# Patient Record
Sex: FEMALE | Race: WHITE | NOT HISPANIC OR LATINO | Employment: OTHER | URBAN - METROPOLITAN AREA
[De-identification: names, ages, dates, MRNs, and addresses within clinical notes are randomized per-mention and may not be internally consistent; named-entity substitution may affect disease eponyms.]

---

## 2017-03-27 ENCOUNTER — HOSPITAL ENCOUNTER (OUTPATIENT)
Dept: MAMMOGRAPHY | Facility: CLINIC | Age: 67
Discharge: HOME | End: 2017-03-27
Payer: MEDICARE

## 2017-03-27 DIAGNOSIS — Z12.31 ENCOUNTER FOR SCREENING MAMMOGRAM FOR MALIGNANT NEOPLASM OF BREAST: ICD-10-CM

## 2017-03-27 PROCEDURE — G0202 SCR MAMMO BI INCL CAD: HCPCS

## 2017-06-01 ENCOUNTER — GENERIC CONVERSION - ENCOUNTER (OUTPATIENT)
Dept: OTHER | Facility: OTHER | Age: 67
End: 2017-06-01

## 2017-06-02 LAB
T4 FREE SERPL-MCNC: 1.29 NG/DL (ref 0.82–1.77)
TSH SERPL DL<=0.05 MIU/L-ACNC: 1.14 UIU/ML (ref 0.45–4.5)

## 2017-06-07 ENCOUNTER — ALLSCRIPTS OFFICE VISIT (OUTPATIENT)
Dept: OTHER | Facility: OTHER | Age: 67
End: 2017-06-07

## 2017-12-01 ENCOUNTER — GENERIC CONVERSION - ENCOUNTER (OUTPATIENT)
Dept: OTHER | Facility: OTHER | Age: 67
End: 2017-12-01

## 2017-12-07 ENCOUNTER — GENERIC CONVERSION - ENCOUNTER (OUTPATIENT)
Dept: OTHER | Facility: OTHER | Age: 67
End: 2017-12-07

## 2017-12-07 DIAGNOSIS — D36.9 BENIGN NEOPLASM: ICD-10-CM

## 2017-12-07 DIAGNOSIS — J45.909 UNCOMPLICATED ASTHMA: ICD-10-CM

## 2017-12-07 DIAGNOSIS — E03.9 HYPOTHYROIDISM: ICD-10-CM

## 2017-12-07 DIAGNOSIS — E78.5 HYPERLIPIDEMIA: ICD-10-CM

## 2017-12-07 DIAGNOSIS — I10 ESSENTIAL (PRIMARY) HYPERTENSION: ICD-10-CM

## 2017-12-07 DIAGNOSIS — R73.9 HYPERGLYCEMIA: ICD-10-CM

## 2017-12-07 LAB
BASOPHILS # BLD AUTO: 0 %
BASOPHILS # BLD AUTO: 0 X10E3/UL (ref 0–0.2)
DEPRECATED RDW RBC AUTO: 14.4 % (ref 12.3–15.4)
EOSINOPHIL # BLD AUTO: 0.1 X10E3/UL (ref 0–0.4)
EOSINOPHIL # BLD AUTO: 1 %
HCT VFR BLD AUTO: 36.7 % (ref 34–46.6)
HGB BLD-MCNC: 12.3 G/DL (ref 11.1–15.9)
IMM.GRANULOCYTES (CD4/8) (HISTORICAL): 0 %
IMM.GRANULOCYTES (CD4/8) (HISTORICAL): 0 X10E3/UL (ref 0–0.1)
LYMPHOCYTES # BLD AUTO: 1.8 X10E3/UL (ref 0.7–3.1)
LYMPHOCYTES # BLD AUTO: 41 %
MCH RBC QN AUTO: 30.3 PG (ref 26.6–33)
MCHC RBC AUTO-ENTMCNC: 33.5 G/DL (ref 31.5–35.7)
MCV RBC AUTO: 90 FL (ref 79–97)
MONOCYTES # BLD AUTO: 0.4 X10E3/UL (ref 0.1–0.9)
MONOCYTES (HISTORICAL): 8 %
NEUTROPHILS # BLD AUTO: 2.1 X10E3/UL (ref 1.4–7)
NEUTROPHILS # BLD AUTO: 50 %
PLATELET # BLD AUTO: 226 X10E3/UL (ref 150–379)
RBC (HISTORICAL): 4.06 X10E6/UL (ref 3.77–5.28)
WBC # BLD AUTO: 4.4 X10E3/UL (ref 3.4–10.8)

## 2017-12-08 LAB
A/G RATIO (HISTORICAL): 2.1 (ref 1.2–2.2)
ALBUMIN SERPL BCP-MCNC: 4.4 G/DL (ref 3.6–4.8)
ALP SERPL-CCNC: 46 IU/L (ref 39–117)
ALT SERPL W P-5'-P-CCNC: 12 IU/L (ref 0–32)
AST SERPL W P-5'-P-CCNC: 18 IU/L (ref 0–40)
BILIRUB SERPL-MCNC: 0.3 MG/DL (ref 0–1.2)
BUN SERPL-MCNC: 16 MG/DL (ref 8–27)
BUN/CREA RATIO (HISTORICAL): 25 (ref 12–28)
CALCIUM SERPL-MCNC: 8.9 MG/DL (ref 8.7–10.3)
CHLORIDE SERPL-SCNC: 102 MMOL/L (ref 96–106)
CO2 SERPL-SCNC: 25 MMOL/L (ref 18–29)
CREAT SERPL-MCNC: 0.63 MG/DL (ref 0.57–1)
EGFR AFRICAN AMERICAN (HISTORICAL): 107 ML/MIN/1.73
EGFR-AMERICAN CALC (HISTORICAL): 93 ML/MIN/1.73
GLUCOSE SERPL-MCNC: 90 MG/DL (ref 65–99)
LDL CHOLESTEROL DIRECT (HISTORICAL): 112 MG/DL (ref 0–99)
POTASSIUM SERPL-SCNC: 4.9 MMOL/L (ref 3.5–5.2)
SODIUM SERPL-SCNC: 141 MMOL/L (ref 134–144)
T4 FREE SERPL-MCNC: 1.34 NG/DL (ref 0.82–1.77)
TOT. GLOBULIN, SERUM (HISTORICAL): 2.1 G/DL (ref 1.5–4.5)
TOTAL PROTEIN (HISTORICAL): 6.5 G/DL (ref 6–8.5)
TRIGL SERPL-MCNC: 58 MG/DL (ref 0–149)
TSH SERPL DL<=0.05 MIU/L-ACNC: 1.15 UIU/ML (ref 0.45–4.5)

## 2017-12-19 ENCOUNTER — ALLSCRIPTS OFFICE VISIT (OUTPATIENT)
Dept: OTHER | Facility: OTHER | Age: 67
End: 2017-12-19

## 2018-01-13 VITALS
TEMPERATURE: 96.8 F | BODY MASS INDEX: 22.96 KG/M2 | DIASTOLIC BLOOD PRESSURE: 78 MMHG | SYSTOLIC BLOOD PRESSURE: 140 MMHG | HEART RATE: 75 BPM | OXYGEN SATURATION: 99 % | WEIGHT: 121.6 LBS | HEIGHT: 61 IN

## 2018-01-13 NOTE — RESULT NOTES
Verified Results  * DXA BONE DENSITY SPINE HIP AND PELVIS [id number] 10:10AM Lou Toure Order Number: [id number]     Test Name Result Flag Reference   DXA BONE DENSITY SPINE HIP AND PELVIS (Report)     CENTRAL DXA SCAN     CLINICAL HISTORY:  77year old post-menopausal  female risk factors include history glucocorticoid use  Previously treated with alendronate  TECHNIQUE: Bone densitometry was performed using a Horizon C bone densitometer  Regions of interest appear properly placed  There are no obvious fractures or other confounding variables which could limit the study  COMPARISON: 7/15/2014  RESULTS:    LUMBAR SPINE: L1-L4:   BMD 0 723 gm/cm2   T-score -2 9   Z-score -1 1     LEFT TOTAL HIP:   BMD 0 711 gm/cm2   T-score -1 9   Z-score -0 6     LEFT FEMORAL NECK:   BMD 0 606 gm/cm2   T-score -2 2   Z-score -0 6             IMPRESSION:   1  Based on the [facility] classification, the T-score of -2 9 in the lumbar spine is consistent with osteoporosis  2  Since the prior study, there has been a decrease of the BMD in the lumbar spine of 0 017 gm/cm2 or 2 5%  In the left hip, there has been a increase in BMD of 0 003 gm/cm2 or 0 4%  The lumbar spine decrease is borderline significant (formal    precision assessment is not available at this time for this scan site)  Clinical evaluation is recommended for treatment of osteoporosis  3  According to the [address] [place] [address], prescription therapy is recommended with a T-score of -2 5 or less in the spine or hip after appropriate evaluation to exclude secondary causes  4  A daily intake of at least 1200 mg Calcium and 800 to 1000 IU of Vitamin D, as well as weight bearing and muscle strengthening exercise, fall prevention and avoidance of tobacco and excessive alcohol intake as basic preventive measures are    suggested  5  Repeat DXA in 18 - 24 months, on the same machine, as clinically indicated           WHO CLASSIFICATION: Normal (a T-score of -1 0 or higher)   Low bone mineral density (a T-score of less than -1 0 but higher than -2 5)   Osteoporosis (a T-score of -2 5 or less)   Severe osteoporosis (a T-score of -2 5 or less with a fragility fracture)             Workstation performed: [id number]     Signed by:   Roxana Tang MD   9/28/16

## 2018-01-17 NOTE — RESULT NOTES
Verified Results  (1) HEMOGLOBIN A1C [id number] 07:18AM Zachary Marshall     Test Name Result Flag Reference   Hemoglobin A1c 5 5 %  4 8-5 6   Pre-diabetes: 5 7 - 6 4           Diabetes: >6 4           Glycemic control for adults with diabetes: <7 0

## 2018-01-18 NOTE — RESULT NOTES
Verified Results  (1) COMPREHENSIVE METABOLIC PANEL [id number] 28:18AY [facility]     Test Name Result Flag Reference   Glucose, Serum 92 mg/dL  65-99   BUN 11 mg/dL  8-27   Creatinine, Serum 0 56 mg/dL L 0 57-1 00   eGFR If NonAfricn Am 97 mL/min/1 73  >59   eGFR If Africn Am 112 mL/min/1 73  >59   BUN/Creatinine Ratio 20  11-26   Sodium, Serum 141 mmol/L  136-144   Potassium, Serum 4 6 mmol/L  3 5-5 2   Chloride, Serum 102 mmol/L     Carbon Dioxide, Total 25 mmol/L  18-29   Calcium, Serum 9 2 mg/dL  8 7-10 3   Protein, Total, Serum 6 2 g/dL  6 0-8 5   Albumin, Serum 4 2 g/dL  3 6-4 8   Globulin, Total 2 0 g/dL  1 5-4 5   A/G Ratio 2 1  1 1-2 5   Bilirubin, Total 0 3 mg/dL  0 0-1 2   Alkaline Phosphatase, S 47 IU/L     AST (SGOT) 14 IU/L  0-40   ALT (SGPT) 11 IU/L  0-32     (LC) Lipid Panel [id number] 07:18AM [facility]     Test Name Result Flag Reference   Cholesterol, Total 205 mg/dL H 100-199   Triglycerides 61 mg/dL  0-149   HDL Cholesterol 91 mg/dL  >39   VLDL Cholesterol Timbo 12 mg/dL  5-40   LDL Cholesterol Calc 102 mg/dL H 0-99     (1) LDL,DIRECT [id number] 07:18AM [facility]     Test Name Result Flag Reference   LDL Chol  (Direct) 112 mg/dL H 0-99     (LC) Thyroxine (T4) Free, Direct, S [id number] 07:18AM [facility]     Test Name Result Flag Reference   T4,Free(Direct) 1 39 ng/dL  0 82-1 77     (1) TSH [id number] 07:18AM [facility]     Test Name Result Flag Reference   TSH 0 899 uIU/mL  0 450-4 500     (1) VITAMIN D 25-HYDROXY [id number] 07:18AM [facility]     Test Name Result Flag Reference   Vitamin D, 25-Hydroxy 36 2 ng/mL  30 0-100 0   Vitamin D deficiency has been defined by the [place] practice guideline as a  level of serum 25-OH vitamin D less than 20 ng/mL (1,2)  The Endocrine Society went on to further define vitamin D  insufficiency as a level between 21 and 29 ng/mL (2)  1  IOM ([employer] [employer])  2010   Dietary reference intakes for calcium and D  [address] [place] [address]: The     [employer]  2  Cherelle MF, Javy NC, Chichi TENORIO, et al      Evaluation, treatment, and prevention of vitamin D     deficiency: an Endocrine Society clinical practice     guideline  JCEM  2011 Jul; 96(7):1911-30  [facility] RY82 Default [id number] 07:18AM Iman Shah Zachary     Test Name Result Flag Reference   Rula Oliverkiley CMP14 Default Comment     A hand-written panel/profile was received from your office  In  accordance with the [facility] Ambiguous Test Code Policy dated July 5756, we have completed your order by using the closest currently  or formerly recognized AMA panel  We have assigned Comprehensive  Metabolic Panel (14), Test Code #820236 to this request   If this  is not the testing you wished to receive on this specimen, please  contact the [address] [place] [address] [employer] Inquiry/Technical Services Department  to clarify the test order  We appreciate your business  [facility]) Rula Díaz LP Default [id number] 07:18AM Iman Shah Zachary     Test Name Result Flag Reference   Nina Edwards LP Default Comment     A hand-written panel/profile was received from your office  In  accordance with the [facility] Ambiguous Test Code Policy dated July 5280, we have completed your order by using the closest currently  or formerly recognized AMA panel  We have assigned Lipid Panel,  Test Code #668352 to this request  If this is not the testing you  wished to receive on this specimen, please contact the [address] [place] [address]  Client Inquiry/Technical Services Department to clarify the test  order  We appreciate your business

## 2018-01-22 VITALS
DIASTOLIC BLOOD PRESSURE: 82 MMHG | SYSTOLIC BLOOD PRESSURE: 140 MMHG | WEIGHT: 125 LBS | HEART RATE: 76 BPM | HEIGHT: 61 IN | BODY MASS INDEX: 23.6 KG/M2 | TEMPERATURE: 97 F | OXYGEN SATURATION: 98 %

## 2018-02-12 DIAGNOSIS — M81.0 AGE RELATED OSTEOPOROSIS, UNSPECIFIED PATHOLOGICAL FRACTURE PRESENCE: Primary | ICD-10-CM

## 2018-02-12 RX ORDER — ALENDRONATE SODIUM 70 MG/1
70 TABLET ORAL
Qty: 12 TABLET | Refills: 3 | Status: SHIPPED | OUTPATIENT
Start: 2018-02-12 | End: 2018-06-04 | Stop reason: SDUPTHER

## 2018-02-12 RX ORDER — ALENDRONATE SODIUM 70 MG/1
TABLET ORAL
COMMUNITY
Start: 2013-08-14 | End: 2018-02-12 | Stop reason: SDUPTHER

## 2018-04-11 ENCOUNTER — HOSPITAL ENCOUNTER (OUTPATIENT)
Dept: MAMMOGRAPHY | Facility: CLINIC | Age: 68
Discharge: HOME | End: 2018-04-11
Payer: MEDICARE

## 2018-04-11 DIAGNOSIS — N60.19 DIFFUSE CYSTIC MASTOPATHY OF BREAST: ICD-10-CM

## 2018-04-11 DIAGNOSIS — Z12.31 ENCOUNTER FOR SCREENING MAMMOGRAM FOR MALIGNANT NEOPLASM OF BREAST: ICD-10-CM

## 2018-04-11 PROCEDURE — 77067 SCR MAMMO BI INCL CAD: CPT

## 2018-04-11 PROCEDURE — 77063 BREAST TOMOSYNTHESIS BI: CPT

## 2018-04-19 DIAGNOSIS — Z12.31 ENCOUNTER FOR SCREENING MAMMOGRAM FOR MALIGNANT NEOPLASM OF BREAST: ICD-10-CM

## 2018-04-19 DIAGNOSIS — N60.19 DIFFUSE CYSTIC MASTOPATHY OF BREAST: ICD-10-CM

## 2018-05-04 ENCOUNTER — TELEPHONE (OUTPATIENT)
Dept: INTERNAL MEDICINE CLINIC | Facility: CLINIC | Age: 68
End: 2018-05-04

## 2018-05-04 DIAGNOSIS — E03.9 HYPOTHYROIDISM, UNSPECIFIED TYPE: Primary | ICD-10-CM

## 2018-05-04 RX ORDER — LEVOTHYROXINE SODIUM 88 UG/1
88 TABLET ORAL DAILY
Qty: 50 TABLET | Refills: 5 | Status: CANCELLED | OUTPATIENT
Start: 2018-05-04

## 2018-05-04 RX ORDER — LEVOTHYROXINE SODIUM 88 UG/1
1 TABLET ORAL DAILY
COMMUNITY
Start: 2014-02-17 | End: 2018-05-04 | Stop reason: SDUPTHER

## 2018-05-04 RX ORDER — LEVOTHYROXINE SODIUM 88 UG/1
88 TABLET ORAL DAILY
Qty: 90 TABLET | Refills: 3 | Status: SHIPPED | OUTPATIENT
Start: 2018-05-04 | End: 2019-01-09 | Stop reason: SDUPTHER

## 2018-05-04 NOTE — TELEPHONE ENCOUNTER
Pharmacy called [facility] to refill a levothyroxine 88 mcg for armen crawfordnatan  [birth year]     Can we please refill this

## 2018-05-19 DIAGNOSIS — M81.0 AGE-RELATED OSTEOPOROSIS WITHOUT CURRENT PATHOLOGICAL FRACTURE: ICD-10-CM

## 2018-05-19 DIAGNOSIS — I10 ESSENTIAL (PRIMARY) HYPERTENSION: ICD-10-CM

## 2018-05-19 DIAGNOSIS — E03.9 HYPOTHYROIDISM: ICD-10-CM

## 2018-05-19 DIAGNOSIS — E78.5 HYPERLIPIDEMIA: ICD-10-CM

## 2018-05-19 DIAGNOSIS — D36.9 BENIGN NEOPLASM: ICD-10-CM

## 2018-05-19 DIAGNOSIS — N60.19 DIFFUSE CYSTIC MASTOPATHY OF BREAST: ICD-10-CM

## 2018-05-19 DIAGNOSIS — J45.909 UNCOMPLICATED ASTHMA: ICD-10-CM

## 2018-05-19 DIAGNOSIS — R73.9 HYPERGLYCEMIA: ICD-10-CM

## 2018-06-04 ENCOUNTER — OFFICE VISIT (OUTPATIENT)
Dept: INTERNAL MEDICINE CLINIC | Facility: CLINIC | Age: 68
End: 2018-06-04
Payer: MEDICARE

## 2018-06-04 VITALS
OXYGEN SATURATION: 98 % | SYSTOLIC BLOOD PRESSURE: 144 MMHG | HEIGHT: 60 IN | TEMPERATURE: 98 F | DIASTOLIC BLOOD PRESSURE: 82 MMHG | BODY MASS INDEX: 25.29 KG/M2 | WEIGHT: 128.8 LBS | RESPIRATION RATE: 18 BRPM | HEART RATE: 72 BPM
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
## 2018-06-04 DIAGNOSIS — M81.0 AGE RELATED OSTEOPOROSIS, UNSPECIFIED PATHOLOGICAL FRACTURE PRESENCE: ICD-10-CM

## 2018-06-04 PROBLEM — D36.9 TUBULAR ADENOMA: Status: ACTIVE | Noted: 2017-06-07

## 2018-06-04 PROCEDURE — 99214 OFFICE O/P EST MOD 30 MIN: CPT | Performed by: NURSE PRACTITIONER

## 2018-06-04 RX ORDER — ACETAMINOPHEN 160 MG
1 TABLET,DISINTEGRATING ORAL DAILY
COMMUNITY
End: 2019-01-09 | Stop reason: SDUPTHER

## 2018-06-04 RX ORDER — LISINOPRIL 20 MG/1
1 TABLET ORAL DAILY
COMMUNITY
Start: 2013-08-01 | End: 2019-01-09 | Stop reason: SDUPTHER

## 2018-06-04 RX ORDER — VITAMIN E 268 MG
1 CAPSULE ORAL 3 TIMES DAILY
COMMUNITY
End: 2020-03-03 | Stop reason: SDUPTHER

## 2018-06-04 RX ORDER — ECHINACEA 400 MG
2 CAPSULE ORAL DAILY
COMMUNITY

## 2018-06-04 RX ORDER — DOXYCYCLINE HYCLATE 100 MG/1
CAPSULE ORAL
Qty: 2 CAPSULE | Refills: 0 | Status: SHIPPED | OUTPATIENT
Start: 2018-06-04 | End: 2018-06-04

## 2018-06-04 RX ORDER — PRAVASTATIN SODIUM 80 MG/1
1 TABLET ORAL DAILY
COMMUNITY
Start: 2013-08-01 | End: 2018-07-15 | Stop reason: SDUPTHER

## 2018-06-04 RX ORDER — ALBUTEROL SULFATE 90 UG/1
2 AEROSOL, METERED RESPIRATORY (INHALATION) EVERY 4 HOURS PRN
COMMUNITY
Start: 2016-02-11 | End: 2019-08-21 | Stop reason: SDUPTHER

## 2018-06-04 RX ORDER — ALENDRONATE SODIUM 70 MG/1
70 TABLET ORAL
Qty: 12 TABLET | Refills: 0 | Status: SHIPPED | OUTPATIENT
Start: 2018-06-04 | End: 2019-01-09 | Stop reason: SDUPTHER

## 2018-06-04 NOTE — PATIENT INSTRUCTIONS
1  Tick bite-will take 2 capsules of Doxycycline today, signs and symptoms of Lyme reviewed  2  Osteoporosis-has been off Fosamax since February due to pharmacy issue, will restart now  Due for next Dexa in November  Follow up with me as needed and with Dr Donato Gross as scheduled 
01-Jun-2018

## 2018-06-04 NOTE — PROGRESS NOTES
Assessment/Plan:    1  Tick bite-will take 2 capsules of Doxycycline today, signs and symptoms of Lyme reviewed  2  Osteoporosis-has been off Fosamax since February due to pharmacy issue, will restart now  Due for next Dexa in November  Follow up with me as needed and with Dr Andi Milner as scheduled  Diagnoses and all orders for this visit:    Tick bite, initial encounter  -     doxycycline hyclate (VIBRAMYCIN) 100 mg capsule; Tke 2 tabs po now with food  Age related osteoporosis, unspecified pathological fracture presence  -     alendronate (FOSAMAX) 70 mg tablet; Take 1 tablet (70 mg total) by mouth every 7 days    Other orders  -     albuterol (PROAIR HFA) 90 mcg/act inhaler; Inhale 2 puffs every 4 (four) hours as needed  -     aspirin 81 MG tablet; Take 1 tablet by mouth daily  -     Flaxseed, Linseed, (FLAXSEED OIL) 1000 MG CAPS; Take 2 capsules by mouth daily  -     lisinopril (ZESTRIL) 20 mg tablet; Take 1 tablet by mouth daily  -     Peak Flow Meter CORNELIA; by Does not apply route  -     pravastatin (PRAVACHOL) 80 mg tablet; Take 1 tablet by mouth daily  -     Cholecalciferol (VITAMIN D3) 2000 units capsule; Take 1 tablet by mouth daily  -     vitamin E, tocopherol, 400 units capsule; Take 1 capsule by mouth 3 (three) times a day        The patient was counseled regarding instructions for management, risk factor reductions, patient and family education,impressions, risks and benefits of treatment options, side effects of medications, importance of compliance with treatment  The treatment plan was reviewed with the patient/guardian and patient/guardian understands and agrees with the treatment plan              Current Outpatient Prescriptions:     albuterol (PROAIR HFA) 90 mcg/act inhaler, Inhale 2 puffs every 4 (four) hours as needed, Disp: , Rfl:     alendronate (FOSAMAX) 70 mg tablet, Take 1 tablet (70 mg total) by mouth every 7 days, Disp: 12 tablet, Rfl: 0    aspirin 81 MG tablet, Take 1 tablet by mouth daily, Disp: , Rfl:     Cholecalciferol (VITAMIN D3) 2000 units capsule, Take 1 tablet by mouth daily, Disp: , Rfl:     Flaxseed, Linseed, (FLAXSEED OIL) 1000 MG CAPS, Take 2 capsules by mouth daily, Disp: , Rfl:     levothyroxine 88 mcg tablet, Take 1 tablet (88 mcg total) by mouth daily, Disp: 90 tablet, Rfl: 3    lisinopril (ZESTRIL) 20 mg tablet, Take 1 tablet by mouth daily, Disp: , Rfl:     Peak Flow Meter CORNELIA, by Does not apply route, Disp: , Rfl:     pravastatin (PRAVACHOL) 80 mg tablet, Take 1 tablet by mouth daily, Disp: , Rfl:     vitamin E, tocopherol, 400 units capsule, Take 1 capsule by mouth 3 (three) times a day, Disp: , Rfl:     doxycycline hyclate (VIBRAMYCIN) 100 mg capsule, Tke 2 tabs po now with food  , Disp: 2 capsule, Rfl: 0    Subjective:      Patient ID: Shanon Jovel is a 79 y o  female  Sloane Hayes found a tick on her left ankle yesterday  NO rash, no bullseye        The following portions of the patient's history were reviewed and updated as appropriate:   She has a past medical history of Pneumonia and Reactive airway disease  ,   does not have any pertinent problems on file  ,   has a past surgical history that includes Neck surgery (10/2009); Neuroplasty / transposition median nerve at carpal tunnel; and Total thyroidectomy  ,  family history includes Diabetes in her mother; Heart disease in her father; Hypertension in her mother  ,   reports that she has quit smoking  She has never used smokeless tobacco  She reports that she does not drink alcohol or use drugs  ,  has No Known Allergies       Review of Systems   Constitutional: Negative  Respiratory: Negative  Cardiovascular: Negative  Musculoskeletal: Negative  Psychiatric/Behavioral: Negative            Objective:  /82 (BP Location: Left arm, Patient Position: Sitting, Cuff Size: Standard)   Pulse 72   Temp 98 °F (36 7 °C) (Tympanic)   Resp 18   Ht 5' (1 524 m)   Wt 58 4 kg (128 lb 12 8 oz)   SpO2 98% BMI 25 15 kg/m²     Lab Review  not applicable     Imaging: No results found  Physical Exam   Constitutional: She is oriented to person, place, and time  She appears well-developed and well-nourished  Cardiovascular: Normal rate, regular rhythm, normal heart sounds and intact distal pulses  Pulmonary/Chest: Effort normal and breath sounds normal    Musculoskeletal: Normal range of motion  Neurological: She is alert and oriented to person, place, and time  She has normal reflexes  Psychiatric: She has a normal mood and affect   Her behavior is normal  Judgment and thought content normal

## 2018-06-29 LAB
25(OH)D3+25(OH)D2 SERPL-MCNC: 32.8 NG/ML (ref 30–100)
CHOLEST SERPL-MCNC: 212 MG/DL (ref 100–199)
HBA1C MFR BLD: 5.5 % (ref 4.8–5.6)
HCV AB S/CO SERPL IA: <0.1 S/CO RATIO (ref 0–0.9)
HDLC SERPL-MCNC: 96 MG/DL
LDLC SERPL CALC-MCNC: 105 MG/DL (ref 0–99)
SL AMB INTERPRETATION: NORMAL
SL AMB VLDL CHOLESTEROL CALC: 11 MG/DL (ref 5–40)
T4 FREE SERPL-MCNC: 1.56 NG/DL (ref 0.82–1.77)
TRIGL SERPL-MCNC: 57 MG/DL (ref 0–149)
TSH SERPL DL<=0.005 MIU/L-ACNC: 1.11 UIU/ML (ref 0.45–4.5)

## 2018-07-03 ENCOUNTER — OFFICE VISIT (OUTPATIENT)
Dept: INTERNAL MEDICINE CLINIC | Facility: CLINIC | Age: 68
End: 2018-07-03
Payer: MEDICARE

## 2018-07-03 VITALS
DIASTOLIC BLOOD PRESSURE: 80 MMHG | WEIGHT: 128 LBS | HEART RATE: 70 BPM | SYSTOLIC BLOOD PRESSURE: 130 MMHG | HEIGHT: 60 IN | TEMPERATURE: 97.8 F | BODY MASS INDEX: 25.13 KG/M2 | RESPIRATION RATE: 18 BRPM | OXYGEN SATURATION: 98 %

## 2018-07-03 DIAGNOSIS — E78.5 HYPERLIPIDEMIA, UNSPECIFIED HYPERLIPIDEMIA TYPE: ICD-10-CM

## 2018-07-03 DIAGNOSIS — M81.0 AGE RELATED OSTEOPOROSIS, UNSPECIFIED PATHOLOGICAL FRACTURE PRESENCE: ICD-10-CM

## 2018-07-03 DIAGNOSIS — E03.9 HYPOTHYROIDISM, UNSPECIFIED TYPE: ICD-10-CM

## 2018-07-03 DIAGNOSIS — I10 HYPERTENSION, UNSPECIFIED TYPE: Primary | ICD-10-CM

## 2018-07-03 DIAGNOSIS — Z23 NEED FOR SHINGLES VACCINE: ICD-10-CM

## 2018-07-03 DIAGNOSIS — D36.9 TUBULAR ADENOMA: ICD-10-CM

## 2018-07-03 DIAGNOSIS — R73.9 HYPERGLYCEMIA: ICD-10-CM
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
## 2018-07-03 PROCEDURE — 99214 OFFICE O/P EST MOD 30 MIN: CPT | Performed by: INTERNAL MEDICINE

## 2018-07-03 NOTE — PATIENT INSTRUCTIONS
Osteoporosis   WHAT YOU NEED TO KNOW:   What is osteoporosis? Osteoporosis is a long-term medical condition that causes your bones to become weak, brittle, and more likely to fracture  Osteoporosis occurs when your body absorbs more bone than it makes  It is also caused by a lack of calcium and estrogen (female hormone)  What increases my risk for osteoporosis? · Age older than 28    · Low estrogen levels    · Female gender    · Alcohol, tobacco, or caffeine    · Lack of calcium and vitamin D in your foods    · Lack of exercise    · Some illnesses, such as thyroid diseases, bone cancer, and long-term lung diseases    · Certain medicines, such as steroids, anticonvulsants, and blood-thinners  What are the signs and symptoms of osteoporosis? You may not have any signs or symptoms  You may break a bone after a muscle strain, bump, or fall  A break usually occurs in the hip, spine, or wrist  A collapsed vertebra (bone in your spine) may cause severe back pain or loss of height from bent posture  How is osteoporosis diagnosed? · Blood and urine tests  measure your calcium, vitamin D, and estrogen levels  · An x-ray or CT  may show thinned bones or a fracture  You may be given contrast liquid to help the bones show up better in the pictures  Tell the healthcare provider if you have ever had an allergic reaction to contrast liquid  Do not enter the MRI room with anything metal  Metal can cause serious injury  Tell the healthcare provider if you have any metal in or on your body  · A bone density test  compares your bone thickness with what is expected for someone of your age, gender, and ethnicity  How is osteoporosis treated? Medicines may be given to prevent bone loss, build new bone, and increase estrogen  These medicines help prevent fractures and may be given as a pill or injection  Ask your healthcare provider for more information on these medicines  How can I help prevent bone loss?    · Eat healthy foods that are high in calcium  This helps keep your bones strong  Good sources of calcium are milk, cheese, broccoli, tofu, almonds, and canned salmon and sardines  · Increase your vitamin D intake  Vitamin D is in fish oils, some vegetables, and fortified milk, cereal, and bread  Vitamin D is also formed in the skin when it is exposed to the sun  Ask your healthcare provider how much sunlight is safe for you  · Drink liquids as directed  Ask your healthcare provider how much liquid to drink each day and which liquids are best for you  Do not have alcohol or caffeine  They decrease bone mineral density, which can weaken your bones  · Exercise regularly  Ask your healthcare provider about the best exercise plan for you  Weight bearing exercise for 30 minutes, 3 times a week can help build and strengthen bone  · Do not smoke  Nicotine and other chemicals in cigarettes and cigars can cause lung damage  Ask your healthcare provider for information if you currently smoke and need help to quit  E-cigarettes or smokeless tobacco still contain nicotine  Talk to your healthcare provider before you use these products  · Go to physical therapy as directed  A physical therapist teaches you exercises to help improve movement and muscle strength  When should I seek immediate care? · You have severe pain  When should I contact my healthcare provider? · You have increasing pain after a fall  · You have pain when you do your daily activities  · You have questions or concerns about your condition or care  CARE AGREEMENT:   You have the right to help plan your care  Learn about your health condition and how it may be treated  Discuss treatment options with your caregivers to decide what care you want to receive  You always have the right to refuse treatment  The above information is an  only  It is not intended as medical advice for individual conditions or treatments  Talk to your doctor, nurse or pharmacist before following any medical regimen to see if it is safe and effective for you  © 2017 [employer] Information is for End User's use only and may not be sold, redistributed or otherwise used for commercial purposes  All illustrations and images included in CareNotes® are the copyrighted property of A D A M , Inc  or Roger Motley

## 2018-07-03 NOTE — PROGRESS NOTES
Assessment/Plan: refuse colonoscopy for adenoma colon agrees cologuard  1  Osteoporosis will start a weight lifting program in addition to her exercise bike she has been walking regularly, will check DEXA scan  2  Hypertension is at goal blood pressure readings at home have been excellent  3  Hyperlipidemia at goal recheck after next visit  4  Hyperglycemia is at goal A1c is normal recheck in 6 months  5  Hypothyroidism is at goal recheck in 6 months  6  Vitamin-D deficiency would like her vitamin-D to be at 40 will recheck in 6 months has been taking 2000 units of vitamin D3 will increase by 3 pills a week         Diagnoses and all orders for this visit:    Hypertension, unspecified type  -     CBC and differential; Future  -     Comprehensive metabolic panel; Future  -     TSH, 3rd generation with Free T4 reflex; Future  -     Vitamin D 25 hydroxy; Future    Hyperlipidemia, unspecified hyperlipidemia type  -     CBC and differential; Future  -     Comprehensive metabolic panel; Future  -     TSH, 3rd generation with Free T4 reflex; Future  -     Vitamin D 25 hydroxy; Future    Hypothyroidism, unspecified type  -     CBC and differential; Future  -     Comprehensive metabolic panel; Future  -     TSH, 3rd generation with Free T4 reflex; Future  -     Vitamin D 25 hydroxy; Future    Tubular adenoma  -     CBC and differential; Future  -     Comprehensive metabolic panel; Future  -     TSH, 3rd generation with Free T4 reflex; Future  -     Vitamin D 25 hydroxy; Future  -     Cologuard; Future    Need for shingles vaccine  -     Zoster Vac Recomb Adjuvanted ([address] [place] [address] [address]) 50 MCG SUSR; Inject 50 mcg into a muscle once for 1 dose  -     CBC and differential; Future  -     Comprehensive metabolic panel; Future  -     TSH, 3rd generation with Free T4 reflex; Future  -     Vitamin D 25 hydroxy;  Future    Age related osteoporosis, unspecified pathological fracture presence  -     CBC and differential; Future  - Comprehensive metabolic panel; Future  -     TSH, 3rd generation with Free T4 reflex; Future  -     Vitamin D 25 hydroxy; Future  -     DXA bone density spine hip and pelvis; Future    Hyperglycemia         Scheduled Meds:  Continuous Infusions:  No current facility-administered medications for this visit  PRN Meds:   Scheduled Meds:  Continuous Infusions:  No current facility-administered medications for this visit  Scheduled Meds:    Current Outpatient Prescriptions:     albuterol (PROAIR HFA) 90 mcg/act inhaler, Inhale 2 puffs every 4 (four) hours as needed, Disp: , Rfl:     alendronate (FOSAMAX) 70 mg tablet, Take 1 tablet (70 mg total) by mouth every 7 days, Disp: 12 tablet, Rfl: 0    aspirin 81 MG tablet, Take 1 tablet by mouth daily, Disp: , Rfl:     Cholecalciferol (VITAMIN D3) 2000 units capsule, Take 1 tablet by mouth daily, Disp: , Rfl:     Flaxseed, Linseed, (FLAXSEED OIL) 1000 MG CAPS, Take 2 capsules by mouth daily, Disp: , Rfl:     levothyroxine 88 mcg tablet, Take 1 tablet (88 mcg total) by mouth daily, Disp: 90 tablet, Rfl: 3    lisinopril (ZESTRIL) 20 mg tablet, Take 1 tablet by mouth daily, Disp: , Rfl:     Peak Flow Meter CORNELIA, by Does not apply route, Disp: , Rfl:     pravastatin (PRAVACHOL) 80 mg tablet, Take 1 tablet by mouth daily, Disp: , Rfl:     vitamin E, tocopherol, 400 units capsule, Take 1 capsule by mouth 3 (three) times a day, Disp: , Rfl:     Zoster Vac Recomb Adjuvanted (SHINGRIX) 50 MCG SUSR, Inject 50 mcg into a muscle once for 1 dose, Disp: 1 each, Rfl: 1      The patient was counseled regarding instructions for management, risk factor reductions, patient and family education,impressions, risks and benefits of treatment options, side effects of medications, importance of compliance with treatment  The treatment plan was reviewed with the patient/guardian and patient/guardian understands and agrees with the treatment plan           Subjective:      Patient ID: Vanna Douglas Xenia is a 79 y o  female  She had a very difficult time with a prep for colonoscopy        The following portions of the patient's history were reviewed and updated as appropriate:   She has a past medical history of Pneumonia and Reactive airway disease  ,   does not have any pertinent problems on file  ,   has a past surgical history that includes Neck surgery (10/2009); Neuroplasty / transposition median nerve at carpal tunnel; and Total thyroidectomy  ,  family history includes Diabetes in her mother; Heart disease in her father; Hypertension in her mother  ,   reports that she quit smoking about 8 years ago  She has never used smokeless tobacco  She reports that she does not drink alcohol or use drugs  ,  has No Known Allergies       Review of Systems   Constitutional: Negative for appetite change, chills, fatigue, fever and unexpected weight change  HENT: Negative for congestion, ear pain, facial swelling, hearing loss, mouth sores, nosebleeds, postnasal drip, rhinorrhea, sinus pain, sore throat, trouble swallowing and voice change  Eyes: Negative for pain, discharge, redness and visual disturbance  Respiratory: Negative for apnea, chest tightness, shortness of breath, wheezing and stridor  Cardiovascular: Negative for chest pain, palpitations and leg swelling  Gastrointestinal: Negative for abdominal distention, abdominal pain, blood in stool, constipation, diarrhea and vomiting  Endocrine: Negative for cold intolerance, heat intolerance, polydipsia, polyphagia and polyuria  Genitourinary: Negative for difficulty urinating, dysuria, flank pain, frequency, genital sores, hematuria and urgency  Musculoskeletal: Negative for arthralgias and back pain  Skin: Negative for rash and wound  Allergic/Immunologic: Negative for environmental allergies, food allergies and immunocompromised state     Neurological: Negative for dizziness, tremors, seizures, syncope, facial asymmetry, speech difficulty, weakness, light-headedness, numbness and headaches  Hematological: Negative for adenopathy  Does not bruise/bleed easily  Psychiatric/Behavioral: Negative for agitation, behavioral problems, dysphoric mood, hallucinations, self-injury, sleep disturbance and suicidal ideas  The patient is not hyperactive  Objective:     Physical Exam   Constitutional: She is oriented to person, place, and time  She appears well-developed  HENT:   Right Ear: External ear normal    Left Ear: External ear normal    Eyes: Right eye exhibits no discharge  Left eye exhibits no discharge  No scleral icterus  Neck: Carotid bruit is not present  No tracheal deviation present  No thyroid mass and no thyromegaly present  Cardiovascular: Normal rate, regular rhythm, normal heart sounds and intact distal pulses  Exam reveals no gallop and no friction rub  No murmur heard  Pulmonary/Chest: No respiratory distress  She has no wheezes  She has no rales  Musculoskeletal: She exhibits no edema  Lymphadenopathy:     She has no cervical adenopathy  Neurological: She is alert and oriented to person, place, and time  Coordination normal    Psychiatric: She has a normal mood and affect  Her behavior is normal  Judgment and thought content normal    Nursing note and vitals reviewed        Vitals:    07/03/18 0751 07/03/18 0819   BP:  130/80   BP Location:  Left arm   Patient Position:  Sitting   Pulse: 70    Resp: 18    Temp: 97 8 °F (36 6 °C)    TempSrc: Tympanic    SpO2: 98%    Weight: 58 1 kg (128 lb)    Height: 5' 0 3" (1 532 m)

## 2018-07-15 DIAGNOSIS — E78.5 HYPERLIPIDEMIA, UNSPECIFIED HYPERLIPIDEMIA TYPE: Primary | ICD-10-CM

## 2018-07-16 RX ORDER — PRAVASTATIN SODIUM 80 MG/1
TABLET ORAL DAILY
Qty: 90 TABLET | Refills: 0 | Status: SHIPPED | OUTPATIENT
Start: 2018-07-16 | End: 2018-10-21 | Stop reason: SDUPTHER

## 2018-10-21 DIAGNOSIS — E78.5 HYPERLIPIDEMIA, UNSPECIFIED HYPERLIPIDEMIA TYPE: ICD-10-CM

## 2018-10-22 RX ORDER — PRAVASTATIN SODIUM 80 MG/1
TABLET ORAL DAILY
Qty: 90 TABLET | Refills: 0 | Status: SHIPPED | OUTPATIENT
Start: 2018-10-22 | End: 2019-01-09 | Stop reason: SDUPTHER

## 2018-12-03 ENCOUNTER — HOSPITAL ENCOUNTER (OUTPATIENT)
Dept: MAMMOGRAPHY | Facility: CLINIC | Age: 68
Discharge: HOME | End: 2018-12-03
Payer: MEDICARE

## 2018-12-03 DIAGNOSIS — M81.0 AGE RELATED OSTEOPOROSIS, UNSPECIFIED PATHOLOGICAL FRACTURE PRESENCE: ICD-10-CM

## 2018-12-03 PROCEDURE — 77080 DXA BONE DENSITY AXIAL: CPT

## 2019-01-03 ENCOUNTER — APPOINTMENT (OUTPATIENT)
Dept: LAB | Facility: CLINIC | Age: 69
End: 2019-01-03
Payer: MEDICARE

## 2019-01-03 ENCOUNTER — TRANSCRIBE ORDERS (OUTPATIENT)
Dept: LAB | Facility: CLINIC | Age: 69
End: 2019-01-03

## 2019-01-03 DIAGNOSIS — E03.9 HYPOTHYROIDISM, UNSPECIFIED TYPE: ICD-10-CM

## 2019-01-03 DIAGNOSIS — Z23 NEED FOR SHINGLES VACCINE: ICD-10-CM

## 2019-01-03 DIAGNOSIS — I10 HYPERTENSION, UNSPECIFIED TYPE: ICD-10-CM

## 2019-01-03 DIAGNOSIS — M81.0 AGE RELATED OSTEOPOROSIS, UNSPECIFIED PATHOLOGICAL FRACTURE PRESENCE: ICD-10-CM

## 2019-01-03 DIAGNOSIS — E78.5 HYPERLIPIDEMIA, UNSPECIFIED HYPERLIPIDEMIA TYPE: ICD-10-CM

## 2019-01-03 DIAGNOSIS — D36.9 TUBULAR ADENOMA: ICD-10-CM

## 2019-01-03 LAB
25(OH)D3 SERPL-MCNC: 31.6 NG/ML (ref 30–100)
ALBUMIN SERPL BCP-MCNC: 3.8 G/DL (ref 3.5–5)
ALP SERPL-CCNC: 49 U/L (ref 46–116)
ALT SERPL W P-5'-P-CCNC: 28 U/L (ref 12–78)
ANION GAP SERPL CALCULATED.3IONS-SCNC: 5 MMOL/L (ref 4–13)
AST SERPL W P-5'-P-CCNC: 20 U/L (ref 5–45)
BASOPHILS # BLD AUTO: 0.03 THOUSANDS/ΜL (ref 0–0.1)
BASOPHILS NFR BLD AUTO: 1 % (ref 0–1)
BILIRUB SERPL-MCNC: 0.33 MG/DL (ref 0.2–1)
BUN SERPL-MCNC: 13 MG/DL (ref 5–25)
CALCIUM SERPL-MCNC: 8.4 MG/DL (ref 8.3–10.1)
CHLORIDE SERPL-SCNC: 105 MMOL/L (ref 100–108)
CO2 SERPL-SCNC: 28 MMOL/L (ref 21–32)
CREAT SERPL-MCNC: 0.59 MG/DL (ref 0.6–1.3)
EOSINOPHIL # BLD AUTO: 0.07 THOUSAND/ΜL (ref 0–0.61)
EOSINOPHIL NFR BLD AUTO: 2 % (ref 0–6)
ERYTHROCYTE [DISTWIDTH] IN BLOOD BY AUTOMATED COUNT: 13.5 % (ref 11.6–15.1)
GFR SERPL CREATININE-BSD FRML MDRD: 94 ML/MIN/1.73SQ M
GLUCOSE P FAST SERPL-MCNC: 81 MG/DL (ref 65–99)
HCT VFR BLD AUTO: 38.2 % (ref 34.8–46.1)
HGB BLD-MCNC: 12.3 G/DL (ref 11.5–15.4)
IMM GRANULOCYTES # BLD AUTO: 0.01 THOUSAND/UL (ref 0–0.2)
IMM GRANULOCYTES NFR BLD AUTO: 0 % (ref 0–2)
LYMPHOCYTES # BLD AUTO: 1.95 THOUSANDS/ΜL (ref 0.6–4.47)
LYMPHOCYTES NFR BLD AUTO: 43 % (ref 14–44)
MCH RBC QN AUTO: 29.8 PG (ref 26.8–34.3)
MCHC RBC AUTO-ENTMCNC: 32.2 G/DL (ref 31.4–37.4)
MCV RBC AUTO: 93 FL (ref 82–98)
MONOCYTES # BLD AUTO: 0.4 THOUSAND/ΜL (ref 0.17–1.22)
MONOCYTES NFR BLD AUTO: 9 % (ref 4–12)
NEUTROPHILS # BLD AUTO: 2.09 THOUSANDS/ΜL (ref 1.85–7.62)
NEUTS SEG NFR BLD AUTO: 45 % (ref 43–75)
NRBC BLD AUTO-RTO: 0 /100 WBCS
PLATELET # BLD AUTO: 228 THOUSANDS/UL (ref 149–390)
PMV BLD AUTO: 12.2 FL (ref 8.9–12.7)
POTASSIUM SERPL-SCNC: 3.9 MMOL/L (ref 3.5–5.3)
PROT SERPL-MCNC: 7.1 G/DL (ref 6.4–8.2)
RBC # BLD AUTO: 4.13 MILLION/UL (ref 3.81–5.12)
SODIUM SERPL-SCNC: 138 MMOL/L (ref 136–145)
TSH SERPL DL<=0.05 MIU/L-ACNC: 2.9 UIU/ML (ref 0.36–3.74)
WBC # BLD AUTO: 4.55 THOUSAND/UL (ref 4.31–10.16)

## 2019-01-03 PROCEDURE — 80053 COMPREHEN METABOLIC PANEL: CPT

## 2019-01-03 PROCEDURE — 82306 VITAMIN D 25 HYDROXY: CPT

## 2019-01-03 PROCEDURE — 85025 COMPLETE CBC W/AUTO DIFF WBC: CPT

## 2019-01-03 PROCEDURE — 84443 ASSAY THYROID STIM HORMONE: CPT

## 2019-01-03 PROCEDURE — 36415 COLL VENOUS BLD VENIPUNCTURE: CPT

## 2019-01-09 ENCOUNTER — OFFICE VISIT (OUTPATIENT)
Dept: INTERNAL MEDICINE CLINIC | Facility: CLINIC | Age: 69
End: 2019-01-09
Payer: MEDICARE

## 2019-01-09 VITALS
BODY MASS INDEX: 25.56 KG/M2 | OXYGEN SATURATION: 98 % | TEMPERATURE: 97.7 F | WEIGHT: 130.2 LBS | SYSTOLIC BLOOD PRESSURE: 125 MMHG | RESPIRATION RATE: 18 BRPM | HEIGHT: 60 IN | DIASTOLIC BLOOD PRESSURE: 80 MMHG | HEART RATE: 71 BPM

## 2019-01-09 DIAGNOSIS — E78.5 HYPERLIPIDEMIA, UNSPECIFIED HYPERLIPIDEMIA TYPE: ICD-10-CM

## 2019-01-09 DIAGNOSIS — M81.0 AGE RELATED OSTEOPOROSIS, UNSPECIFIED PATHOLOGICAL FRACTURE PRESENCE: ICD-10-CM

## 2019-01-09 DIAGNOSIS — R73.9 HYPERGLYCEMIA: ICD-10-CM

## 2019-01-09 DIAGNOSIS — E03.9 HYPOTHYROIDISM, UNSPECIFIED TYPE: ICD-10-CM

## 2019-01-09 DIAGNOSIS — I10 HYPERTENSION, UNSPECIFIED TYPE: ICD-10-CM

## 2019-01-09 DIAGNOSIS — D36.9 TUBULAR ADENOMA: ICD-10-CM

## 2019-01-09 DIAGNOSIS — J31.0 CHRONIC RHINITIS: Primary | ICD-10-CM

## 2019-01-09 PROCEDURE — 99214 OFFICE O/P EST MOD 30 MIN: CPT | Performed by: INTERNAL MEDICINE

## 2019-01-09 RX ORDER — ACETAMINOPHEN 160 MG
TABLET,DISINTEGRATING ORAL
Qty: 100 CAPSULE | Refills: 0
Start: 2019-01-09

## 2019-01-09 RX ORDER — CETIRIZINE HYDROCHLORIDE 5 MG/1
5 TABLET, CHEWABLE ORAL DAILY
Qty: 30 TABLET | Refills: 2
Start: 2019-01-09 | End: 2019-08-21

## 2019-01-09 RX ORDER — ALENDRONATE SODIUM 70 MG/1
70 TABLET ORAL
Qty: 12 TABLET | Refills: 0 | Status: SHIPPED | OUTPATIENT
Start: 2019-01-09 | End: 2019-10-15 | Stop reason: SDUPTHER

## 2019-01-09 RX ORDER — AZELASTINE 1 MG/ML
1 SPRAY, METERED NASAL 2 TIMES DAILY
Qty: 1 BOTTLE | Refills: 5 | Status: SHIPPED | OUTPATIENT
Start: 2019-01-09 | End: 2019-10-17

## 2019-01-09 RX ORDER — FEXOFENADINE HCL 180 MG/1
TABLET ORAL
Qty: 30 TABLET | Refills: 2
Start: 2019-01-09 | End: 2021-03-31 | Stop reason: ALTCHOICE

## 2019-01-09 RX ORDER — LISINOPRIL 20 MG/1
20 TABLET ORAL DAILY
Qty: 90 TABLET | Refills: 2 | Status: SHIPPED | OUTPATIENT
Start: 2019-01-09 | End: 2019-09-24 | Stop reason: SDUPTHER

## 2019-01-09 RX ORDER — PRAVASTATIN SODIUM 80 MG/1
80 TABLET ORAL DAILY
Qty: 90 TABLET | Refills: 2 | Status: SHIPPED | OUTPATIENT
Start: 2019-01-09 | End: 2020-04-27 | Stop reason: SDUPTHER

## 2019-01-09 RX ORDER — AZELASTINE HYDROCHLORIDE 0.5 MG/ML
1 SOLUTION/ DROPS OPHTHALMIC 2 TIMES DAILY
Qty: 6 ML | Refills: 5 | Status: SHIPPED | OUTPATIENT
Start: 2019-01-09 | End: 2019-10-17

## 2019-01-09 RX ORDER — LEVOTHYROXINE SODIUM 88 UG/1
88 TABLET ORAL DAILY
Qty: 90 TABLET | Refills: 0 | Status: SHIPPED | OUTPATIENT
Start: 2019-01-09 | End: 2019-04-29 | Stop reason: SDUPTHER

## 2019-01-09 NOTE — PROGRESS NOTES
Assessment/Plan:  History of tubular adenoma she has cologuard have for April 1 hypertension is at goal continue lisinopril  2  Osteoporosis lumbar spine has decreased minimally no real history of fractures vitamin-D is still low will increase her vitamin-D to 2 capsules of vitamin D3 2000 units per day will continue generic Fosamax weekly recheck in 2 years did discuss alternative treatments will continue present Rx she is tolerating it well has had no fractures and there is good fracture data with Fosamax  3  Hypothyroidism is is at goal TSH is 2 9 but is asymptomatic will recheck in 6 months  4  Allergic rhinitis and conjunctivitis appears to be doing well with a half of a 10 mg Zyrtec at night and half of a 180 mg of Allegra in the morning may consider trying Astelin nasal an ophthalmic drops if not effective to use a mask while vacuuming animal hair   5  Hyperglycemia blood sugars are excellent as well as A1c will recheck in 6 months           Diagnoses and all orders for this visit:    Chronic rhinitis  -     cetirizine (ZyrTEC) 5 MG chewable tablet; Chew 1 tablet (5 mg total) daily  -     fexofenadine (ALLEGRA) 180 MG tablet; 0 5 in AM  -     azelastine (ASTELIN) 0 1 % nasal spray; 1 spray into each nostril 2 (two) times a day Use in each nostril as directed  -     azelastine (OPTIVAR) 0 05 % ophthalmic solution; Administer 1 drop to both eyes 2 (two) times a day  -     CBC and differential; Future  -     Comprehensive metabolic panel; Future  -     Hemoglobin A1C; Future  -     LDL cholesterol, direct; Future  -     Lipid Panel with Direct LDL reflex; Future  -     TSH, 3rd generation with Free T4 reflex; Future  -     Vitamin D 25 hydroxy; Future  -     Calcium, ionized; Future  -     Cortisol Level, AM Specimen; Future  -     Celiac Disease Antibody Profile; Future  -     Protein electrophoresis, serum;  Future    Hyperlipidemia, unspecified hyperlipidemia type  -     pravastatin (PRAVACHOL) 80 mg tablet; Take 1 tablet (80 mg total) by mouth daily  -     CBC and differential; Future  -     Comprehensive metabolic panel; Future  -     Hemoglobin A1C; Future  -     LDL cholesterol, direct; Future  -     Lipid Panel with Direct LDL reflex; Future  -     TSH, 3rd generation with Free T4 reflex; Future  -     Vitamin D 25 hydroxy; Future  -     Calcium, ionized; Future  -     Cortisol Level, AM Specimen; Future  -     Celiac Disease Antibody Profile; Future  -     Protein electrophoresis, serum; Future    Hypertension, unspecified type  -     lisinopril (ZESTRIL) 20 mg tablet; Take 1 tablet (20 mg total) by mouth daily  -     CBC and differential; Future  -     Comprehensive metabolic panel; Future  -     Hemoglobin A1C; Future  -     LDL cholesterol, direct; Future  -     Lipid Panel with Direct LDL reflex; Future  -     TSH, 3rd generation with Free T4 reflex; Future  -     Vitamin D 25 hydroxy; Future  -     Calcium, ionized; Future  -     Cortisol Level, AM Specimen; Future  -     Celiac Disease Antibody Profile; Future  -     Protein electrophoresis, serum; Future    Hypothyroidism, unspecified type  -     levothyroxine 88 mcg tablet; Take 1 tablet (88 mcg total) by mouth daily  -     CBC and differential; Future  -     Comprehensive metabolic panel; Future  -     Hemoglobin A1C; Future  -     LDL cholesterol, direct; Future  -     Lipid Panel with Direct LDL reflex; Future  -     TSH, 3rd generation with Free T4 reflex; Future  -     Vitamin D 25 hydroxy; Future  -     Calcium, ionized; Future  -     Cortisol Level, AM Specimen; Future  -     Celiac Disease Antibody Profile; Future  -     Protein electrophoresis, serum; Future    Tubular adenoma  -     CBC and differential; Future  -     Comprehensive metabolic panel; Future  -     Hemoglobin A1C; Future  -     LDL cholesterol, direct; Future  -     Lipid Panel with Direct LDL reflex; Future  -     TSH, 3rd generation with Free T4 reflex;  Future  - Vitamin D 25 hydroxy; Future  -     Calcium, ionized; Future  -     Cortisol Level, AM Specimen; Future  -     Celiac Disease Antibody Profile; Future  -     Protein electrophoresis, serum; Future    Hyperglycemia  -     CBC and differential; Future  -     Comprehensive metabolic panel; Future  -     Hemoglobin A1C; Future  -     LDL cholesterol, direct; Future  -     Lipid Panel with Direct LDL reflex; Future  -     TSH, 3rd generation with Free T4 reflex; Future  -     Vitamin D 25 hydroxy; Future  -     Calcium, ionized; Future  -     Cortisol Level, AM Specimen; Future  -     Celiac Disease Antibody Profile; Future  -     Protein electrophoresis, serum; Future    Age related osteoporosis, unspecified pathological fracture presence  -     alendronate (FOSAMAX) 70 mg tablet; Take 1 tablet (70 mg total) by mouth every 7 days  -     DXA bone density spine hip and pelvis; Future  -     CBC and differential; Future  -     Comprehensive metabolic panel; Future  -     Hemoglobin A1C; Future  -     LDL cholesterol, direct; Future  -     Lipid Panel with Direct LDL reflex; Future  -     TSH, 3rd generation with Free T4 reflex; Future  -     Vitamin D 25 hydroxy; Future  -     Calcium, ionized; Future  -     Cortisol Level, AM Specimen; Future  -     Celiac Disease Antibody Profile; Future  -     Protein electrophoresis, serum; Future  -     Cholecalciferol (VITAMIN D3) 2000 units capsule; daily        The patient was counseled regarding instructions for management, risk factor reductions, patient and family education,impressions, risks and benefits of treatment options, side effects of medications, importance of compliance with treatment  The treatment plan was reviewed with the patient/guardian and patient/guardian understands and agrees with the treatment plan              Current Outpatient Prescriptions:     albuterol (PROAIR HFA) 90 mcg/act inhaler, Inhale 2 puffs every 4 (four) hours as needed, Disp: , Rfl:    alendronate (FOSAMAX) 70 mg tablet, Take 1 tablet (70 mg total) by mouth every 7 days, Disp: 12 tablet, Rfl: 0    aspirin 81 MG tablet, Take 1 tablet by mouth daily, Disp: , Rfl:     Cholecalciferol (VITAMIN D3) 2000 units capsule, daily, Disp: 100 capsule, Rfl: 0    Flaxseed, Linseed, (FLAXSEED OIL) 1000 MG CAPS, Take 2 capsules by mouth daily, Disp: , Rfl:     levothyroxine 88 mcg tablet, Take 1 tablet (88 mcg total) by mouth daily, Disp: 90 tablet, Rfl: 0    lisinopril (ZESTRIL) 20 mg tablet, Take 1 tablet (20 mg total) by mouth daily, Disp: 90 tablet, Rfl: 2    Peak Flow Meter CORNELIA, by Does not apply route, Disp: , Rfl:     pravastatin (PRAVACHOL) 80 mg tablet, Take 1 tablet (80 mg total) by mouth daily, Disp: 90 tablet, Rfl: 2    vitamin E, tocopherol, 400 units capsule, Take 1 capsule by mouth 3 (three) times a day, Disp: , Rfl:     azelastine (ASTELIN) 0 1 % nasal spray, 1 spray into each nostril 2 (two) times a day Use in each nostril as directed, Disp: 1 Bottle, Rfl: 5    azelastine (OPTIVAR) 0 05 % ophthalmic solution, Administer 1 drop to both eyes 2 (two) times a day, Disp: 6 mL, Rfl: 5    cetirizine (ZyrTEC) 5 MG chewable tablet, Chew 1 tablet (5 mg total) daily, Disp: 30 tablet, Rfl: 2    fexofenadine (ALLEGRA) 180 MG tablet, 0 5 in AM, Disp: 30 tablet, Rfl: 2    Subjective:      Patient ID: Liane Hurd is a 76 y o  female  2 cats and a dog chronic rhinitis Zyrtec Allegra eyes watering ineffective did not tolerate flonase        The following portions of the patient's history were reviewed and updated as appropriate:   She has a past medical history of Pneumonia and Reactive airway disease  ,   does not have any pertinent problems on file  ,   has a past surgical history that includes Neck surgery (10/2009); Neuroplasty / transposition median nerve at carpal tunnel; and Total thyroidectomy  ,  family history includes Diabetes in her mother; Heart disease in her father; Hypertension in her mother  ,   reports that she quit smoking about 8 years ago  She has never used smokeless tobacco  She reports that she does not drink alcohol or use drugs  ,  has No Known Allergies       Review of Systems   Constitutional: Negative for appetite change, chills, fatigue, fever and unexpected weight change  HENT: Positive for postnasal drip and rhinorrhea  Negative for congestion, ear pain, facial swelling, hearing loss, mouth sores, nosebleeds, sinus pain, sore throat, trouble swallowing and voice change  Eyes: Negative for pain, discharge, redness and visual disturbance  Respiratory: Negative for apnea, chest tightness, shortness of breath, wheezing and stridor  Cardiovascular: Negative for chest pain, palpitations and leg swelling  Gastrointestinal: Negative for abdominal distention, abdominal pain, blood in stool, constipation, diarrhea and vomiting  Endocrine: Negative for cold intolerance, heat intolerance, polydipsia, polyphagia and polyuria  Genitourinary: Negative for difficulty urinating, dysuria, flank pain, frequency, genital sores, hematuria and urgency  Musculoskeletal: Negative for arthralgias and back pain  Skin: Negative for rash and wound  Allergic/Immunologic: Negative for environmental allergies, food allergies and immunocompromised state  Neurological: Negative for dizziness, tremors, seizures, syncope, facial asymmetry, speech difficulty, weakness, light-headedness, numbness and headaches  Hematological: Negative for adenopathy  Does not bruise/bleed easily  Psychiatric/Behavioral: Negative for agitation, behavioral problems, dysphoric mood, hallucinations, self-injury, sleep disturbance and suicidal ideas  The patient is not hyperactive            Objective:  /80 (BP Location: Left arm, Patient Position: Sitting)   Pulse 71   Temp 97 7 °F (36 5 °C) (Tympanic)   Resp 18   Ht 5' (1 524 m)   Wt 59 1 kg (130 lb 3 2 oz)   SpO2 98%   BMI 25 43 kg/m²     Lab Review  Appointment on 01/03/2019   Component Date Value    WBC 01/03/2019 4 55     RBC 01/03/2019 4 13     Hemoglobin 01/03/2019 12 3     Hematocrit 01/03/2019 38 2     MCV 01/03/2019 93     MCH 01/03/2019 29 8     MCHC 01/03/2019 32 2     RDW 01/03/2019 13 5     MPV 01/03/2019 12 2     Platelets 32/84/6332 228     nRBC 01/03/2019 0     Neutrophils Relative 01/03/2019 45     Immat GRANS % 01/03/2019 0     Lymphocytes Relative 01/03/2019 43     Monocytes Relative 01/03/2019 9     Eosinophils Relative 01/03/2019 2     Basophils Relative 01/03/2019 1     Neutrophils Absolute 01/03/2019 2 09     Immature Grans Absolute 01/03/2019 0 01     Lymphocytes Absolute 01/03/2019 1 95     Monocytes Absolute 01/03/2019 0 40     Eosinophils Absolute 01/03/2019 0 07     Basophils Absolute 01/03/2019 0 03     Sodium 01/03/2019 138     Potassium 01/03/2019 3 9     Chloride 01/03/2019 105     CO2 01/03/2019 28     ANION GAP 01/03/2019 5     BUN 01/03/2019 13     Creatinine 01/03/2019 0 59*    Glucose, Fasting 01/03/2019 81     Calcium 01/03/2019 8 4     AST 01/03/2019 20     ALT 01/03/2019 28     Alkaline Phosphatase 01/03/2019 49     Total Protein 01/03/2019 7 1     Albumin 01/03/2019 3 8     Total Bilirubin 01/03/2019 0 33     eGFR 01/03/2019 94     TSH 3RD GENERATON 01/03/2019 2 900     Vit D, 25-Hydroxy 01/03/2019 31 6         Imaging: No results found  DXA bone density spine hip and pelvis    (Results Pending)     No results found for this or any previous visit  Physical Exam   Constitutional: She is oriented to person, place, and time  She appears well-developed  Over weight   HENT:   Right Ear: External ear normal    Left Ear: External ear normal    Eyes: Right eye exhibits no discharge  Left eye exhibits no discharge  No scleral icterus  Neck: Carotid bruit is not present  No tracheal deviation present  No thyroid mass and no thyromegaly present     Cardiovascular: Normal rate, regular rhythm, normal heart sounds and intact distal pulses  Exam reveals no gallop and no friction rub  No murmur heard  Pulmonary/Chest: No respiratory distress  She has no wheezes  She has no rales  Musculoskeletal: She exhibits no edema  Lymphadenopathy:     She has no cervical adenopathy  Neurological: She is alert and oriented to person, place, and time  Coordination normal    Psychiatric: She has a normal mood and affect  Her behavior is normal  Judgment and thought content normal    Nursing note and vitals reviewed

## 2019-01-27 DIAGNOSIS — E78.5 HYPERLIPIDEMIA, UNSPECIFIED HYPERLIPIDEMIA TYPE: ICD-10-CM

## 2019-01-28 RX ORDER — PRAVASTATIN SODIUM 80 MG/1
TABLET ORAL DAILY
Qty: 90 TABLET | Refills: 0 | Status: SHIPPED | OUTPATIENT
Start: 2019-01-28 | End: 2019-08-21 | Stop reason: SDUPTHER

## 2019-03-20 ENCOUNTER — TELEPHONE (OUTPATIENT)
Dept: INTERNAL MEDICINE CLINIC | Facility: CLINIC | Age: 69
End: 2019-03-20

## 2019-03-20 DIAGNOSIS — N60.19 FIBROCYSTIC BREAST DISEASE (FCBD), UNSPECIFIED LATERALITY: ICD-10-CM

## 2019-03-20 DIAGNOSIS — Z12.39 BREAST CANCER SCREENING: Primary | ICD-10-CM

## 2019-03-20 NOTE — TELEPHONE ENCOUNTER
To comply with our HM, please order a mammo screening bilateral w 3d & cad for patient   Patient is due in Apr and would like to set up an apt  t/y

## 2019-04-15 ENCOUNTER — HOSPITAL ENCOUNTER (OUTPATIENT)
Dept: MAMMOGRAPHY | Facility: CLINIC | Age: 69
Discharge: HOME | End: 2019-04-15
Payer: MEDICARE

## 2019-04-15 VITALS — WEIGHT: 129 LBS | HEIGHT: 60 IN | BODY MASS INDEX: 25.32 KG/M2

## 2019-04-15 DIAGNOSIS — N60.19 FIBROCYSTIC BREAST DISEASE (FCBD), UNSPECIFIED LATERALITY: ICD-10-CM

## 2019-04-15 DIAGNOSIS — Z12.39 BREAST CANCER SCREENING: ICD-10-CM

## 2019-04-15 PROCEDURE — 77067 SCR MAMMO BI INCL CAD: CPT

## 2019-04-15 PROCEDURE — 77063 BREAST TOMOSYNTHESIS BI: CPT

## 2019-04-29 DIAGNOSIS — E03.9 HYPOTHYROIDISM, UNSPECIFIED TYPE: ICD-10-CM

## 2019-04-29 RX ORDER — LEVOTHYROXINE SODIUM 88 UG/1
TABLET ORAL
Qty: 90 TABLET | Refills: 0 | Status: SHIPPED | OUTPATIENT
Start: 2019-04-29 | End: 2019-07-29 | Stop reason: SDUPTHER

## 2019-07-25 DIAGNOSIS — E03.9 HYPOTHYROIDISM, UNSPECIFIED TYPE: ICD-10-CM

## 2019-07-25 RX ORDER — LEVOTHYROXINE SODIUM 88 UG/1
TABLET ORAL
Qty: 90 TABLET | Refills: 0 | Status: CANCELLED | OUTPATIENT
Start: 2019-07-25

## 2019-07-29 DIAGNOSIS — E03.9 HYPOTHYROIDISM, UNSPECIFIED TYPE: ICD-10-CM

## 2019-07-29 RX ORDER — LEVOTHYROXINE SODIUM 88 UG/1
88 TABLET ORAL DAILY
Qty: 90 TABLET | Refills: 1 | Status: SHIPPED | OUTPATIENT
Start: 2019-07-29 | End: 2020-01-08 | Stop reason: SDUPTHER

## 2019-08-12 ENCOUNTER — TELEPHONE (OUTPATIENT)
Dept: INTERNAL MEDICINE CLINIC | Facility: CLINIC | Age: 69
End: 2019-08-12

## 2019-08-12 DIAGNOSIS — Z12.11 SCREENING FOR MALIGNANT NEOPLASM OF COLON: Primary | ICD-10-CM

## 2019-08-15 ENCOUNTER — TRANSCRIBE ORDERS (OUTPATIENT)
Dept: LAB | Facility: CLINIC | Age: 69
End: 2019-08-15

## 2019-08-15 ENCOUNTER — APPOINTMENT (OUTPATIENT)
Dept: LAB | Facility: CLINIC | Age: 69
End: 2019-08-15
Payer: MEDICARE

## 2019-08-15 DIAGNOSIS — E78.5 HYPERLIPIDEMIA, UNSPECIFIED HYPERLIPIDEMIA TYPE: ICD-10-CM

## 2019-08-15 DIAGNOSIS — D36.9 TUBULAR ADENOMA: ICD-10-CM

## 2019-08-15 DIAGNOSIS — R73.9 HYPERGLYCEMIA: ICD-10-CM

## 2019-08-15 DIAGNOSIS — J31.0 CHRONIC RHINITIS: ICD-10-CM

## 2019-08-15 DIAGNOSIS — M81.0 AGE RELATED OSTEOPOROSIS, UNSPECIFIED PATHOLOGICAL FRACTURE PRESENCE: ICD-10-CM

## 2019-08-15 DIAGNOSIS — E03.9 HYPOTHYROIDISM, UNSPECIFIED TYPE: ICD-10-CM

## 2019-08-15 DIAGNOSIS — I10 HYPERTENSION, UNSPECIFIED TYPE: ICD-10-CM

## 2019-08-15 LAB
25(OH)D3 SERPL-MCNC: 30 NG/ML (ref 30–100)
ALBUMIN SERPL BCP-MCNC: 3.8 G/DL (ref 3.5–5)
ALP SERPL-CCNC: 55 U/L (ref 46–116)
ALT SERPL W P-5'-P-CCNC: 20 U/L (ref 12–78)
ANION GAP SERPL CALCULATED.3IONS-SCNC: 6 MMOL/L (ref 4–13)
AST SERPL W P-5'-P-CCNC: 14 U/L (ref 5–45)
BASOPHILS # BLD AUTO: 0.03 THOUSANDS/ΜL (ref 0–0.1)
BASOPHILS NFR BLD AUTO: 1 % (ref 0–1)
BILIRUB SERPL-MCNC: 0.56 MG/DL (ref 0.2–1)
BUN SERPL-MCNC: 12 MG/DL (ref 5–25)
CA-I BLD-SCNC: 1.15 MMOL/L (ref 1.12–1.32)
CALCIUM SERPL-MCNC: 8.9 MG/DL (ref 8.3–10.1)
CHLORIDE SERPL-SCNC: 105 MMOL/L (ref 100–108)
CHOLEST SERPL-MCNC: 223 MG/DL (ref 50–200)
CO2 SERPL-SCNC: 28 MMOL/L (ref 21–32)
CORTIS AM PEAK SERPL-MCNC: 16.6 UG/DL (ref 4.2–22.4)
CREAT SERPL-MCNC: 0.7 MG/DL (ref 0.6–1.3)
EOSINOPHIL # BLD AUTO: 0.07 THOUSAND/ΜL (ref 0–0.61)
EOSINOPHIL NFR BLD AUTO: 2 % (ref 0–6)
ERYTHROCYTE [DISTWIDTH] IN BLOOD BY AUTOMATED COUNT: 13.4 % (ref 11.6–15.1)
EST. AVERAGE GLUCOSE BLD GHB EST-MCNC: 111 MG/DL
GFR SERPL CREATININE-BSD FRML MDRD: 89 ML/MIN/1.73SQ M
GLUCOSE P FAST SERPL-MCNC: 90 MG/DL (ref 65–99)
HBA1C MFR BLD: 5.5 % (ref 4.2–6.3)
HCT VFR BLD AUTO: 39.7 % (ref 34.8–46.1)
HDLC SERPL-MCNC: 96 MG/DL (ref 40–60)
HGB BLD-MCNC: 12.8 G/DL (ref 11.5–15.4)
IMM GRANULOCYTES # BLD AUTO: 0.02 THOUSAND/UL (ref 0–0.2)
IMM GRANULOCYTES NFR BLD AUTO: 1 % (ref 0–2)
LDLC SERPL CALC-MCNC: 112 MG/DL (ref 0–100)
LDLC SERPL DIRECT ASSAY-MCNC: 105 MG/DL (ref 0–100)
LYMPHOCYTES # BLD AUTO: 1.53 THOUSANDS/ΜL (ref 0.6–4.47)
LYMPHOCYTES NFR BLD AUTO: 35 % (ref 14–44)
MCH RBC QN AUTO: 29.3 PG (ref 26.8–34.3)
MCHC RBC AUTO-ENTMCNC: 32.2 G/DL (ref 31.4–37.4)
MCV RBC AUTO: 91 FL (ref 82–98)
MONOCYTES # BLD AUTO: 0.36 THOUSAND/ΜL (ref 0.17–1.22)
MONOCYTES NFR BLD AUTO: 8 % (ref 4–12)
NEUTROPHILS # BLD AUTO: 2.31 THOUSANDS/ΜL (ref 1.85–7.62)
NEUTS SEG NFR BLD AUTO: 53 % (ref 43–75)
NRBC BLD AUTO-RTO: 0 /100 WBCS
PLATELET # BLD AUTO: 208 THOUSANDS/UL (ref 149–390)
PMV BLD AUTO: 12.1 FL (ref 8.9–12.7)
POTASSIUM SERPL-SCNC: 3.9 MMOL/L (ref 3.5–5.3)
PROT SERPL-MCNC: 7.3 G/DL (ref 6.4–8.2)
RBC # BLD AUTO: 4.37 MILLION/UL (ref 3.81–5.12)
SODIUM SERPL-SCNC: 139 MMOL/L (ref 136–145)
TRIGL SERPL-MCNC: 74 MG/DL
TSH SERPL DL<=0.05 MIU/L-ACNC: 2.04 UIU/ML (ref 0.36–3.74)
WBC # BLD AUTO: 4.32 THOUSAND/UL (ref 4.31–10.16)

## 2019-08-15 PROCEDURE — 82784 ASSAY IGA/IGD/IGG/IGM EACH: CPT

## 2019-08-15 PROCEDURE — 86334 IMMUNOFIX E-PHORESIS SERUM: CPT | Performed by: PATHOLOGY

## 2019-08-15 PROCEDURE — 83516 IMMUNOASSAY NONANTIBODY: CPT

## 2019-08-15 PROCEDURE — 82533 TOTAL CORTISOL: CPT

## 2019-08-15 PROCEDURE — 36415 COLL VENOUS BLD VENIPUNCTURE: CPT

## 2019-08-15 PROCEDURE — 85025 COMPLETE CBC W/AUTO DIFF WBC: CPT

## 2019-08-15 PROCEDURE — 80061 LIPID PANEL: CPT

## 2019-08-15 PROCEDURE — 82306 VITAMIN D 25 HYDROXY: CPT

## 2019-08-15 PROCEDURE — 80053 COMPREHEN METABOLIC PANEL: CPT

## 2019-08-15 PROCEDURE — 83036 HEMOGLOBIN GLYCOSYLATED A1C: CPT

## 2019-08-15 PROCEDURE — 84165 PROTEIN E-PHORESIS SERUM: CPT

## 2019-08-15 PROCEDURE — 84443 ASSAY THYROID STIM HORMONE: CPT

## 2019-08-15 PROCEDURE — 86255 FLUORESCENT ANTIBODY SCREEN: CPT

## 2019-08-15 PROCEDURE — 83721 ASSAY OF BLOOD LIPOPROTEIN: CPT

## 2019-08-15 PROCEDURE — 86334 IMMUNOFIX E-PHORESIS SERUM: CPT

## 2019-08-15 PROCEDURE — 84165 PROTEIN E-PHORESIS SERUM: CPT | Performed by: PATHOLOGY

## 2019-08-15 PROCEDURE — 82330 ASSAY OF CALCIUM: CPT

## 2019-08-16 LAB
ALBUMIN SERPL ELPH-MCNC: 4.33 G/DL (ref 3.5–5)
ALBUMIN SERPL ELPH-MCNC: 62.8 % (ref 52–65)
ALPHA1 GLOB SERPL ELPH-MCNC: 0.35 G/DL (ref 0.1–0.4)
ALPHA1 GLOB SERPL ELPH-MCNC: 5 % (ref 2.5–5)
ALPHA2 GLOB SERPL ELPH-MCNC: 0.74 G/DL (ref 0.4–1.2)
ALPHA2 GLOB SERPL ELPH-MCNC: 10.7 % (ref 7–13)
BETA GLOB ABNORMAL SERPL ELPH-MCNC: 0.38 G/DL (ref 0.4–0.8)
BETA1 GLOB SERPL ELPH-MCNC: 5.5 % (ref 5–13)
BETA2 GLOB SERPL ELPH-MCNC: 4.5 % (ref 2–8)
BETA2+GAMMA GLOB SERPL ELPH-MCNC: 0.31 G/DL (ref 0.2–0.5)
GAMMA GLOB ABNORMAL SERPL ELPH-MCNC: 0.79 G/DL (ref 0.5–1.6)
GAMMA GLOB SERPL ELPH-MCNC: 11.5 % (ref 12–22)
IGG/ALB SER: 1.69 {RATIO} (ref 1.1–1.8)
INTERPRETATION UR IFE-IMP: NORMAL
M PROTEIN 1 MFR SERPL ELPH: 2 %
M PROTEIN 1 SERPL ELPH-MCNC: 0.14 G/DL
PROT PATTERN SERPL ELPH-IMP: ABNORMAL
PROT SERPL-MCNC: 6.9 G/DL (ref 6.4–8.2)

## 2019-08-17 LAB
ENDOMYSIUM IGA SER QL: NEGATIVE
GLIADIN PEPTIDE IGA SER-ACNC: 2 UNITS (ref 0–19)
GLIADIN PEPTIDE IGG SER-ACNC: 8 UNITS (ref 0–19)
IGA SERPL-MCNC: 71 MG/DL (ref 87–352)
TTG IGA SER-ACNC: <2 U/ML (ref 0–3)
TTG IGG SER-ACNC: <2 U/ML (ref 0–5)

## 2019-08-21 ENCOUNTER — TELEPHONE (OUTPATIENT)
Dept: SURGICAL ONCOLOGY | Facility: CLINIC | Age: 69
End: 2019-08-21

## 2019-08-21 ENCOUNTER — OFFICE VISIT (OUTPATIENT)
Dept: INTERNAL MEDICINE CLINIC | Facility: CLINIC | Age: 69
End: 2019-08-21
Payer: MEDICARE

## 2019-08-21 VITALS
TEMPERATURE: 97.8 F | DIASTOLIC BLOOD PRESSURE: 80 MMHG | BODY MASS INDEX: 25.32 KG/M2 | WEIGHT: 129 LBS | HEART RATE: 86 BPM | OXYGEN SATURATION: 97 % | RESPIRATION RATE: 18 BRPM | HEIGHT: 60 IN | SYSTOLIC BLOOD PRESSURE: 140 MMHG

## 2019-08-21 DIAGNOSIS — I10 HYPERTENSION, UNSPECIFIED TYPE: Primary | ICD-10-CM

## 2019-08-21 DIAGNOSIS — E03.9 HYPOTHYROIDISM, UNSPECIFIED TYPE: ICD-10-CM

## 2019-08-21 DIAGNOSIS — E78.5 HYPERLIPIDEMIA, UNSPECIFIED HYPERLIPIDEMIA TYPE: ICD-10-CM

## 2019-08-21 DIAGNOSIS — R05.9 COUGH: ICD-10-CM

## 2019-08-21 DIAGNOSIS — D47.2 MONOCLONAL GAMMOPATHY OF UNDETERMINED SIGNIFICANCE: ICD-10-CM

## 2019-08-21 DIAGNOSIS — R73.9 HYPERGLYCEMIA: ICD-10-CM

## 2019-08-21 DIAGNOSIS — D36.9 TUBULAR ADENOMA: ICD-10-CM

## 2019-08-21 DIAGNOSIS — M81.0 AGE RELATED OSTEOPOROSIS, UNSPECIFIED PATHOLOGICAL FRACTURE PRESENCE: ICD-10-CM

## 2019-08-21 PROCEDURE — 99214 OFFICE O/P EST MOD 30 MIN: CPT | Performed by: INTERNAL MEDICINE

## 2019-08-21 RX ORDER — ALBUTEROL SULFATE 90 UG/1
2 AEROSOL, METERED RESPIRATORY (INHALATION) EVERY 4 HOURS PRN
Qty: 1 INHALER | Refills: 5 | Status: SHIPPED | OUTPATIENT
Start: 2019-08-21 | End: 2020-09-16 | Stop reason: SDUPTHER

## 2019-08-21 NOTE — PROGRESS NOTES
Assessment/Plan:    1 patient with monoclonal gammopathy of undetermined significance no anemia low white count low platelet count kidney function or abnormal calcium will refer to Hematology Oncology for follow-up  2  Hypothyroidism is at goal will recheck in 6 months  3  Patient with an elevated blood sugar A1c is at goal recheck in 6 months diet lifestyle recommended  4  Hypertension blood pressure is borderline patient will be calling if she gets several readings greater than 130/80   5  Adrenal adenoma cologuard is pending         Diagnoses and all orders for this visit:    Hypertension, unspecified type  -     Protein electrophoresis, serum; Future  -     Protein electrophoresis, urine; Future  -     CBC and differential; Future  -     Comprehensive metabolic panel; Future  -     Hemoglobin A1C; Future  -     Lipid Panel with Direct LDL reflex; Future  -     LDL cholesterol, direct; Future  -     T3, free; Future  -     T4, free; Future  -     TSH, 3rd generation; Future    Hyperlipidemia, unspecified hyperlipidemia type  -     Protein electrophoresis, serum; Future  -     Protein electrophoresis, urine; Future  -     CBC and differential; Future  -     Comprehensive metabolic panel; Future  -     Hemoglobin A1C; Future  -     Lipid Panel with Direct LDL reflex; Future  -     LDL cholesterol, direct; Future  -     T3, free; Future  -     T4, free; Future  -     TSH, 3rd generation; Future    Hyperglycemia  -     Protein electrophoresis, serum; Future  -     Protein electrophoresis, urine; Future  -     CBC and differential; Future  -     Comprehensive metabolic panel; Future  -     Hemoglobin A1C; Future  -     Lipid Panel with Direct LDL reflex; Future  -     LDL cholesterol, direct; Future  -     T3, free; Future  -     T4, free; Future  -     TSH, 3rd generation; Future    Age related osteoporosis, unspecified pathological fracture presence  -     Protein electrophoresis, serum;  Future  -     Protein electrophoresis, urine; Future  -     CBC and differential; Future  -     Comprehensive metabolic panel; Future  -     Hemoglobin A1C; Future  -     Lipid Panel with Direct LDL reflex; Future  -     LDL cholesterol, direct; Future  -     T3, free; Future  -     T4, free; Future  -     TSH, 3rd generation; Future    Tubular adenoma  -     Protein electrophoresis, serum; Future  -     Protein electrophoresis, urine; Future  -     CBC and differential; Future  -     Comprehensive metabolic panel; Future  -     Hemoglobin A1C; Future  -     Lipid Panel with Direct LDL reflex; Future  -     LDL cholesterol, direct; Future  -     T3, free; Future  -     T4, free; Future  -     TSH, 3rd generation; Future    Cough  -     albuterol (PROAIR HFA) 90 mcg/act inhaler; Inhale 2 puffs every 4 (four) hours as needed for wheezing or shortness of breath (cough)  -     Protein electrophoresis, serum; Future  -     Protein electrophoresis, urine; Future  -     CBC and differential; Future  -     Comprehensive metabolic panel; Future  -     Hemoglobin A1C; Future  -     Lipid Panel with Direct LDL reflex; Future  -     LDL cholesterol, direct; Future  -     T3, free; Future  -     T4, free; Future  -     TSH, 3rd generation; Future    Monoclonal gammopathy of undetermined significance IgG lambda  -     Ambulatory referral to Hematology / Oncology; Future  -     Protein electrophoresis, serum; Future  -     Protein electrophoresis, urine; Future  -     CBC and differential; Future  -     Comprehensive metabolic panel; Future  -     Hemoglobin A1C; Future  -     Lipid Panel with Direct LDL reflex; Future  -     LDL cholesterol, direct; Future  -     T3, free; Future  -     T4, free; Future  -     TSH, 3rd generation; Future    Hypothyroidism, unspecified type  -     Protein electrophoresis, serum; Future  -     Protein electrophoresis, urine; Future  -     CBC and differential; Future  -     Comprehensive metabolic panel;  Future  - Hemoglobin A1C; Future  -     Lipid Panel with Direct LDL reflex; Future  -     LDL cholesterol, direct; Future  -     T3, free; Future  -     T4, free; Future  -     TSH, 3rd generation; Future    BMI 25 0-25 9,adult        The patient was counseled regarding instructions for management, risk factor reductions, patient and family education,impressions, risks and benefits of treatment options, side effects of medications, importance of compliance with treatment  The treatment plan was reviewed with the patient/guardian and patient/guardian understands and agrees with the treatment plan              Current Outpatient Medications:     albuterol (PROAIR HFA) 90 mcg/act inhaler, Inhale 2 puffs every 4 (four) hours as needed for wheezing or shortness of breath (cough), Disp: 1 Inhaler, Rfl: 5    alendronate (FOSAMAX) 70 mg tablet, Take 1 tablet (70 mg total) by mouth every 7 days, Disp: 12 tablet, Rfl: 0    aspirin 81 MG tablet, Take 1 tablet by mouth daily, Disp: , Rfl:     Cholecalciferol (VITAMIN D3) 2000 units capsule, daily, Disp: 100 capsule, Rfl: 0    fexofenadine (ALLEGRA) 180 MG tablet, 0 5 in AM, Disp: 30 tablet, Rfl: 2    Flaxseed, Linseed, (FLAXSEED OIL) 1000 MG CAPS, Take 2 capsules by mouth daily, Disp: , Rfl:     levothyroxine 88 mcg tablet, Take 1 tablet (88 mcg total) by mouth daily, Disp: 90 tablet, Rfl: 1    lisinopril (ZESTRIL) 20 mg tablet, Take 1 tablet (20 mg total) by mouth daily, Disp: 90 tablet, Rfl: 2    Peak Flow Meter CORNELIA, by Does not apply route, Disp: , Rfl:     pravastatin (PRAVACHOL) 80 mg tablet, Take 1 tablet (80 mg total) by mouth daily, Disp: 90 tablet, Rfl: 2    vitamin E, tocopherol, 400 units capsule, Take 1 capsule by mouth 3 (three) times a day, Disp: , Rfl:     azelastine (ASTELIN) 0 1 % nasal spray, 1 spray into each nostril 2 (two) times a day Use in each nostril as directed (Patient not taking: Reported on 8/21/2019), Disp: 1 Bottle, Rfl: 5    azelastine (OPTIVAR) 0 05 % ophthalmic solution, Administer 1 drop to both eyes 2 (two) times a day (Patient not taking: Reported on 8/21/2019), Disp: 6 mL, Rfl: 5    Subjective:      Patient ID: Josee Lai is a 76 y o  female  Rarely uses her rescue inhaler has some cough occasionally some shortness of breath going up hill no chest pain or diaphoresis or nausea      The following portions of the patient's history were reviewed and updated as appropriate:   She has a past medical history of Pneumonia and Reactive airway disease  ,  does not have any pertinent problems on file  ,   has a past surgical history that includes Neck surgery (10/2009); Neuroplasty / transposition median nerve at carpal tunnel; and Total thyroidectomy  ,  family history includes Breast cancer (age of onset: 79) in her paternal grandmother; Diabetes in her mother; Heart disease in her father; Hypertension in her mother  ,   reports that she quit smoking about 9 years ago  She has never used smokeless tobacco  She reports that she does not drink alcohol or use drugs  ,  has No Known Allergies       Review of Systems   Constitutional: Negative for appetite change, chills, fatigue, fever and unexpected weight change  HENT: Negative for congestion, ear pain, facial swelling, hearing loss, mouth sores, nosebleeds, postnasal drip, rhinorrhea, sinus pain, sore throat, trouble swallowing and voice change  Eyes: Negative for pain, discharge, redness and visual disturbance  Respiratory: Negative for apnea, chest tightness, shortness of breath, wheezing and stridor  Cardiovascular: Negative for chest pain, palpitations and leg swelling  Gastrointestinal: Negative for abdominal distention, abdominal pain, blood in stool, constipation, diarrhea and vomiting  Endocrine: Negative for cold intolerance, heat intolerance, polydipsia, polyphagia and polyuria     Genitourinary: Negative for difficulty urinating, dysuria, flank pain, frequency, genital sores, hematuria and urgency  Musculoskeletal: Negative for arthralgias and back pain  Skin: Negative for rash and wound  Allergic/Immunologic: Negative for environmental allergies, food allergies and immunocompromised state  Neurological: Negative for dizziness, tremors, seizures, syncope, facial asymmetry, speech difficulty, weakness, light-headedness, numbness and headaches  Hematological: Negative for adenopathy  Does not bruise/bleed easily  Psychiatric/Behavioral: Negative for agitation, behavioral problems, dysphoric mood, hallucinations, self-injury, sleep disturbance and suicidal ideas  The patient is not hyperactive            Objective:  /80 (BP Location: Left arm, Patient Position: Sitting)   Pulse 86   Temp 97 8 °F (36 6 °C)   Resp 18   Ht 5' (1 524 m)   Wt 58 5 kg (129 lb)   SpO2 97%   BMI 25 19 kg/m²     Lab Review  Transcribe Orders on 08/15/2019   Component Date Value    WBC 08/15/2019 4 32     RBC 08/15/2019 4 37     Hemoglobin 08/15/2019 12 8     Hematocrit 08/15/2019 39 7     MCV 08/15/2019 91     MCH 08/15/2019 29 3     MCHC 08/15/2019 32 2     RDW 08/15/2019 13 4     MPV 08/15/2019 12 1     Platelets 37/36/0730 208     nRBC 08/15/2019 0     Neutrophils Relative 08/15/2019 53     Immat GRANS % 08/15/2019 1     Lymphocytes Relative 08/15/2019 35     Monocytes Relative 08/15/2019 8     Eosinophils Relative 08/15/2019 2     Basophils Relative 08/15/2019 1     Neutrophils Absolute 08/15/2019 2 31     Immature Grans Absolute 08/15/2019 0 02     Lymphocytes Absolute 08/15/2019 1 53     Monocytes Absolute 08/15/2019 0 36     Eosinophils Absolute 08/15/2019 0 07     Basophils Absolute 08/15/2019 0 03     Sodium 08/15/2019 139     Potassium 08/15/2019 3 9     Chloride 08/15/2019 105     CO2 08/15/2019 28     ANION GAP 08/15/2019 6     BUN 08/15/2019 12     Creatinine 08/15/2019 0 70     Glucose, Fasting 08/15/2019 90     Calcium 08/15/2019 8 9     AST 08/15/2019 14     ALT 08/15/2019 20     Alkaline Phosphatase 08/15/2019 55     Total Protein 08/15/2019 7 3     Albumin 08/15/2019 3 8     Total Bilirubin 08/15/2019 0 56     eGFR 08/15/2019 89     Hemoglobin A1C 08/15/2019 5 5     EAG 08/15/2019 111     LDL Direct 08/15/2019 105*    Cholesterol 08/15/2019 223*    Triglycerides 08/15/2019 74     HDL, Direct 08/15/2019 96*    LDL Calculated 08/15/2019 112*    TSH 3RD GENERATON 08/15/2019 2 040     Vit D, 25-Hydroxy 08/15/2019 30 0     Calcium, Ionized 08/15/2019 1 15     Cortisol - AM 08/15/2019 16 6     IgA 08/15/2019 71*    Gliadin IgA 08/15/2019 2     Gliadin IgG 08/15/2019 8     Tissue Transglut Ab IGG 08/15/2019 <2     TISSUE TRANSGLUTAMINASE * 08/15/2019 <2     Endomysial IgA 08/15/2019 Negative     A/G Ratio 08/15/2019 1 69     Albumin Electrophoresis 08/15/2019 62 8     Albumin CONC 08/15/2019 4 33     Alpha 1 08/15/2019 5 0     ALPHA 1 CONC 08/15/2019 0 35     Alpha 2 08/15/2019 10 7     ALPHA 2 CONC 08/15/2019 0 74     Beta-1 08/15/2019 5 5     BETA 1 CONC 08/15/2019 0 38*    Beta-2 08/15/2019 4 5     BETA 2 CONC 08/15/2019 0 31     Gamma Globulin 08/15/2019 11 5*    GAMMA CONC 08/15/2019 0 79     M Peak ID 1 08/15/2019 2 00     M PEAK 1 CONC 08/15/2019 0 14     Total Protein 08/15/2019 6 9     SPEP Interpretation 08/15/2019 The SPEP shows a monoclonal peak in the gamma region  Immunofixation to be performed  Reviewed by: Brianna León MD **Electronic Signature**     Immunofixation Interpret* 08/15/2019 Serum immunofixation shows a monoclonal gammopathy identified as IgG lambda (0 14 g/dL)  Reviewed by: Brianna León MD ** Electronic Signature**          Imaging  @[email or web address]@     No orders to display     No results found for this or any previous visit  Physical Exam   Constitutional: She is oriented to person, place, and time  She appears well-developed     HENT:   Right Ear: External ear normal    Left Ear: External ear normal    Eyes: Right eye exhibits no discharge  Left eye exhibits no discharge  No scleral icterus  Neck: Carotid bruit is not present  No tracheal deviation present  No thyroid mass and no thyromegaly present  Cardiovascular: Normal rate, regular rhythm, normal heart sounds and intact distal pulses  Exam reveals no gallop and no friction rub  No murmur heard  Pulmonary/Chest: No respiratory distress  She has no wheezes  She has no rales  Musculoskeletal: She exhibits no edema  Lymphadenopathy:     She has no cervical adenopathy  Neurological: She is alert and oriented to person, place, and time  Coordination normal    Psychiatric: She has a normal mood and affect  Her behavior is normal  Judgment and thought content normal    Nursing note and vitals reviewed  BMI Counseling: Body mass index is 25 19 kg/m²  Discussed the patient's BMI with her  The BMI is above average  BMI counseling and education was provided to the patient  Nutrition recommendations include reducing portion sizes, decreasing overall calorie intake, 3-5 servings of fruits/vegetables daily, reducing fast food intake, consuming healthier snacks, decreasing soda and/or juice intake, moderation in carbohydrate intake, increasing intake of lean protein and reducing intake of saturated fat and trans fat  Exercise recommendations include moderate aerobic physical activity for 150 minutes/week, exercising 3-5 times per week and strength training exercises

## 2019-08-21 NOTE — TELEPHONE ENCOUNTER
New Patient Encounter      New Patient Intake Form   Patient Details:  Emily Vasquez  1950  [id number]    Background Information:  [id number] starts by opening a telephone encounter and gathering the following information   Who is calling to schedule? If not self, relationship to patient? self   Referring Provider Ivis Villareal   What is the diagnosis? Monoclonal gammopathy   When was the diagnosis? 8/2019   Is patient aware of diagnosis? Yes   Reason for visit? NP DX   Have you had any testing done? If so: when, where? Yes  [facility]   Are records in EPIC? Yes   Was the patient told to bring a disk? No   Scheduling Information:   Preferred [place]:  [facility]   Requesting Specific Provider? vandana   Are there any dates/time the patient cannot be seen? no   Counseling Pre-Screen:  If the patient answers YES to any of the below questions and has a cancer diagnosis, please route to the appropriate location specific counselor    Have you felt anxious or worried about your diagnosis and/or the treatment you are receiving? No   Has your diagnosis caused physical, emotional, or financial hardship for you? No   Do you anticipate any transportation issues to get to your appointment? No   Miscellaneous: scheduled pt with Dr Joseph Finch   After completing the above information, please route to Financial Counselor and the appropriate Nurse Navigator for review

## 2019-08-21 NOTE — PATIENT INSTRUCTIONS
1 patient with monoclonal gammopathy of undetermined significance no anemia low white count low platelet count kidney function or abnormal calcium will refer to Hematology Oncology for follow-up  2  Hypothyroidism is at goal will recheck in 6 months  3  Patient with an elevated blood sugar A1c is at goal recheck in 6 months diet lifestyle recommended  4  Hypertension blood pressure is borderline patient will be calling if she gets several readings greater than 130/80   5  Adrenal adenoma cologuard is pending    Obesity   AMBULATORY CARE:   Obesity  is when your body mass index (BMI) is greater than 30  Your healthcare provider will use your height and weight to measure your BMI  The risks of obesity include  many health problems, such as injuries or physical disability  You may need tests to check for the following:  · Diabetes     · High blood pressure or high cholesterol     · Heart disease     · Gallbladder or liver disease     · Cancer of the colon, breast, prostate, liver, or kidney     · Sleep apnea     · Arthritis or gout  Seek care immediately if:   · You have a severe headache, confusion, or difficulty speaking  · You have weakness on one side of your body  · You have chest pain, sweating, or shortness of breath  Contact your healthcare provider if:   · You have symptoms of gallbladder or liver disease, such as pain in your upper abdomen  · You have knee or hip pain and discomfort while walking  · You have symptoms of diabetes, such as intense hunger and thirst, and frequent urination  · You have symptoms of sleep apnea, such as snoring or daytime sleepiness  · You have questions or concerns about your condition or care  Treatment for obesity  focuses on helping you lose weight to improve your health  Even a small decrease in BMI can reduce the risk for many health problems   Your healthcare provider will help you set a weight-loss goal   · Lifestyle changes  are the first step in treating obesity  These include making healthy food choices and getting regular physical activity  Your healthcare provider may suggest a weight-loss program that involves coaching, education, and therapy  · Medicine  may help you lose weight when it is used with a healthy diet and physical activity  · Surgery  can help you lose weight if you are very obese and have other health problems  There are several types of weight-loss surgery  Ask your healthcare provider for more information  Be successful losing weight:   · Set small, realistic goals  An example of a small goal is to walk for 20 minutes 5 days a week  Anther goal is to lose 5% of your body weight  · Tell friends, family members, and coworkers about your goals  and ask for their support  Ask a friend to lose weight with you, or join a weight-loss support group  · Identify foods or triggers that may cause you to overeat , and find ways to avoid them  Remove tempting high-calorie foods from your home and workplace  Place a bowl of fresh fruit on your kitchen counter  If stress causes you to eat, then find other ways to cope with stress  · Keep a diary to track what you eat and drink  Also write down how many minutes of physical activity you do each day  Weigh yourself once a week and record it in your diary  Eating changes: You will need to eat 500 to 1,000 fewer calories each day than you currently eat to lose 1 to 2 pounds a week  The following changes will help you cut calories:  · Eat smaller portions  Use small plates, no larger than 9 inches in diameter  Fill your plate half full of fruits and vegetables  Measure your food using measuring cups until you know what a serving size looks like  · Eat 3 meals and 1 or 2 snacks each day  Plan your meals in advance  Ángela Quesada and eat at home most of the time  Eat slowly       · Eat fruits and vegetables at every meal   They are low in calories and high in fiber, which makes you feel full  Do not add butter, margarine, or cream sauce to vegetables  Use herbs to season steamed vegetables  · Eat less fat and fewer fried foods  Eat more baked or grilled chicken and fish  These protein sources are lower in calories and fat than red meat  Limit fast food  Dress your salads with olive oil and vinegar instead of bottled dressing  · Limit the amount of sugar you eat  Do not drink sugary beverages  Limit alcohol  Activity changes:  Physical activity is good for your body in many ways  It helps you burn calories and build strong muscles  It decreases stress and depression, and improves your mood  It can also help you sleep better  Talk to your healthcare provider before you begin an exercise program   · Exercise for at least 30 minutes 5 days a week  Start slowly  Set aside time each day for physical activity that you enjoy and that is convenient for you  It is best to do both weight training and an activity that increases your heart rate, such as walking, bicycling, or swimming  · Find ways to be more active  Do yard work and housecleaning  Walk up the stairs instead of using elevators  Spend your leisure time going to events that require walking, such as outdoor festivals or fairs  This extra physical activity can help you lose weight and keep it off  Follow up with your healthcare provider as directed: You may need to meet with a dietitian  Write down your questions so you remember to ask them during your visits  © 2017 [facility] Information is for End User's use only and may not be sold, redistributed or otherwise used for commercial purposes  All illustrations and images included in CareNotes® are the copyrighted property of A D A M , Inc  or Roger Motley  The above information is an  only  It is not intended as medical advice for individual conditions or treatments   Talk to your doctor, nurse or pharmacist before following any medical regimen to see if it is safe and effective for you

## 2019-09-04 ENCOUNTER — TELEPHONE (OUTPATIENT)
Dept: INTERNAL MEDICINE CLINIC | Facility: CLINIC | Age: 69
End: 2019-09-04

## 2019-09-04 NOTE — TELEPHONE ENCOUNTER
Patient aware  ----- Message from Phuong Osorio DO sent at 9/4/2019  1:45 PM EDT -----  Please call patient cologuard is negative

## 2019-09-23 ENCOUNTER — TELEPHONE (OUTPATIENT)
Dept: INTERNAL MEDICINE CLINIC | Facility: CLINIC | Age: 69
End: 2019-09-23

## 2019-09-23 DIAGNOSIS — I10 HYPERTENSION, UNSPECIFIED TYPE: ICD-10-CM

## 2019-09-23 NOTE — TELEPHONE ENCOUNTER
Patient s blood pressures have been 130/80-85 recently    Doc told her at last appointment he wouldn't be against upping the dosage of her bp med and to call if they are high  So this is happening,  Can provider give her ok to do that or to be seen?

## 2019-09-24 RX ORDER — LISINOPRIL 30 MG/1
30 TABLET ORAL DAILY
Qty: 90 TABLET | Refills: 2 | Status: SHIPPED | OUTPATIENT
Start: 2019-09-24 | End: 2020-07-03 | Stop reason: SDUPTHER

## 2019-09-24 NOTE — TELEPHONE ENCOUNTER
Lisinopril script prescription for 30 mg sent in to [facility], may take up to a month to be effective please call patient and inform

## 2019-10-15 DIAGNOSIS — M81.0 AGE RELATED OSTEOPOROSIS, UNSPECIFIED PATHOLOGICAL FRACTURE PRESENCE: ICD-10-CM

## 2019-10-16 RX ORDER — ALENDRONATE SODIUM 70 MG/1
TABLET ORAL
Qty: 12 TABLET | Refills: 0 | Status: SHIPPED | OUTPATIENT
Start: 2019-10-16 | End: 2020-01-08 | Stop reason: SDUPTHER

## 2019-10-17 ENCOUNTER — CONSULT (OUTPATIENT)
Dept: HEMATOLOGY ONCOLOGY | Facility: CLINIC | Age: 69
End: 2019-10-17
Payer: MEDICARE

## 2019-10-17 VITALS
WEIGHT: 128 LBS | HEART RATE: 72 BPM | BODY MASS INDEX: 25.13 KG/M2 | TEMPERATURE: 98.5 F | SYSTOLIC BLOOD PRESSURE: 174 MMHG | OXYGEN SATURATION: 95 % | DIASTOLIC BLOOD PRESSURE: 102 MMHG | HEIGHT: 60 IN | RESPIRATION RATE: 16 BRPM

## 2019-10-17 DIAGNOSIS — Z87.891 FORMER SMOKER: ICD-10-CM

## 2019-10-17 DIAGNOSIS — R92.8 MAMMOGRAM ABNORMAL: ICD-10-CM

## 2019-10-17 DIAGNOSIS — D47.2 MONOCLONAL GAMMOPATHY OF UNDETERMINED SIGNIFICANCE: Primary | ICD-10-CM

## 2019-10-17 PROCEDURE — 99203 OFFICE O/P NEW LOW 30 MIN: CPT | Performed by: INTERNAL MEDICINE

## 2019-10-17 NOTE — PROGRESS NOTES
HEMATOLOGY / [facility] NOTE    Primary Care Provider: Michoacano Ramsey DO  Referring Provider: Sepideh Anisa  MRN: [id number]  : 1950    Reason for Encounter:  Chief Complaint   Patient presents with    Consult     MGUS         History of Hematology / Oncology Illness:     Marcella Sever is a 71 y o  female who came in  to establish care with oncology        MGUS  - IgG, 0 14 g, low risk  Assessment / Plan:         1  Monoclonal gammopathy of undetermined significance IgG lambda  - low risk MGUS, no red flag signs or symptoms suggesting underlying multiple myeloma  We decided continue observed patient  Made her aware this is a pre cancer condition, estimated chance to progress into multiple myeloma is 0 3% a year  - patient will have a SPEP and free light chain in 6 months and follow-up  If numbers remains low, will then follow patient on a yearly basis  - Protein electrophoresis, serum; Future  - Immunoglobulin free LT chains blood; Future     2  Mammogram abnormal  Yearly mammogram    3  Former smoker  - patient stopped 10 years ago             45      minutes were spent face to face with patient with greater than 50% of the time spent in counseling or coordination of care including discussions of treatment instructions  All of the patient's questions were answered to their satisfactory during this discussion  Advised pt to call if there is any further questions  Interval History:     10/17/2019 :  Patient is a 71 year female, history of hypertension, borderline diabetes, former smoker, recently diagnosed MGUS came in to establish care with hematology  Reported overall at baseline health status no constitutional symptoms, body weight stable  No bone pain  No bleeding easy bruise  Patient is a former smoker  Has no prior hematology oncology issues in past   No family history of malignancy          Problem list:     Patient Active Problem List   Diagnosis    Fibrocystic breast disease    Hyperglycemia    Hyperlipidemia    Hypertension    Hypothyroidism    Mammogram abnormal    Osteoporosis    Tubular adenoma    Chronic rhinitis    Screening for malignant neoplasm of colon    Cough    Monoclonal gammopathy of undetermined significance IgG lambda         PHYSICIAL EXAMINATION:     Vital Signs:   BP (!) 174/102 Comment: patient stated she has White Coat Syndrome  Pulse 72   Temp 98 5 °F (36 9 °C) (Oral)   Resp 16   Ht 5' (1 524 m)   Wt 58 1 kg (128 lb)   SpO2 95%   BMI 25 00 kg/m²   Body surface area is 1 54 meters squared  Ht Readings from Last 3 Encounters:   10/17/19 5' (1 524 m)   08/21/19 5' (1 524 m)   04/15/19 5' (1 524 m)       Wt Readings from Last 3 Encounters:   10/17/19 58 1 kg (128 lb)   08/21/19 58 5 kg (129 lb)   04/15/19 58 5 kg (129 lb)        Temp Readings from Last 3 Encounters:   10/17/19 98 5 °F (36 9 °C) (Oral)   08/21/19 97 8 °F (36 6 °C)   01/09/19 97 7 °F (36 5 °C) (Tympanic)        BP Readings from Last 3 Encounters:   10/17/19 (!) 174/102   08/21/19 140/80   01/09/19 125/80         Pulse Readings from Last 3 Encounters:   10/17/19 72   08/21/19 86   01/09/19 71       No major findings on physical examination    GEN: Alert, awake oriented x3, in no acute distress  HEENT- No pallor, icterus, cyanosis, no oral mucosal lesions,   LAD - no palpable cervical, clavicle, axillary, inguinal LAD  Heart- normal S1 S2, regular rate and rhythm, No murmur, rubs  Lungs- decreased breathing sound bilateral    Abdomen- soft, Non tender, bowel sounds present  Extremities- No cyanosis, clubbing, edema  Neuro- No focal neurological deficit           PAST MEDICAL HISTORY:   has a past medical history of Pneumonia and Reactive airway disease  PAST SURGICAL HISTORY:   has a past surgical history that includes Neck surgery (10/2009); Neuroplasty / transposition median nerve at carpal tunnel; and Total thyroidectomy      CURRENT MEDICATIONS:   Current Outpatient Medications   Medication Sig Dispense Refill    albuterol (PROAIR HFA) 90 mcg/act inhaler Inhale 2 puffs every 4 (four) hours as needed for wheezing or shortness of breath (cough) 1 Inhaler 5    alendronate (FOSAMAX) 70 mg tablet TAKE 1 TABLET BY MOUTH EVERY 7 DAYS 12 tablet 0    aspirin 81 MG tablet Take 1 tablet by mouth daily      Cholecalciferol (VITAMIN D3) 2000 units capsule daily 100 capsule 0    fexofenadine (ALLEGRA) 180 MG tablet 0 5 in AM 30 tablet 2    Flaxseed, Linseed, (FLAXSEED OIL) 1000 MG CAPS Take 2 capsules by mouth daily      levothyroxine 88 mcg tablet Take 1 tablet (88 mcg total) by mouth daily 90 tablet 1    lisinopril (ZESTRIL) 30 mg tablet Take 1 tablet (30 mg total) by mouth daily 90 tablet 2    Peak Flow Meter CORNELIA by Does not apply route      pravastatin (PRAVACHOL) 80 mg tablet Take 1 tablet (80 mg total) by mouth daily 90 tablet 2    vitamin E, tocopherol, 400 units capsule Take 1 capsule by mouth 3 (three) times a day       No current facility-administered medications for this visit  [unfilled]    SOCIAL HISTORY:   reports that she quit smoking about 9 years ago  She has never used smokeless tobacco  She reports that she does not drink alcohol or use drugs  FAMILY HISTORY:  family history includes Breast cancer (age of onset: 79) in her paternal grandmother; Diabetes in her mother; Heart disease in her father; Hypertension in her mother  ALLERGIES:  has No Known Allergies  REVIEW OF SYSTEMS:  Please note that a 14-point review of systems was performed to include Constitutional, HEENT, Respiratory, CVS, GI, , Musculoskeletal, Integumentary, Neurologic, Rheumatologic, Endocrinologic, Psychiatric, Lymphatic, and Hematologic/Oncologic systems were reviewed and are negative unless otherwise stated in HPI  Positive and negative findings pertinent to this evaluation are incorporated into the history of present illness              LAB:  Lab Results Component Value Date    WBC 4 32 08/15/2019    HGB 12 8 08/15/2019    HCT 39 7 08/15/2019    MCV 91 08/15/2019     08/15/2019     Lab Results   Component Value Date     12/07/2017    SODIUM 139 08/15/2019    K 3 9 08/15/2019     08/15/2019    CO2 28 08/15/2019    AGAP 6 08/15/2019    BUN 12 08/15/2019    CREATININE 0 70 08/15/2019    GLUF 90 08/15/2019    CALCIUM 8 9 08/15/2019    AST 14 08/15/2019    ALT 20 08/15/2019    ALKPHOS 55 08/15/2019    PROT 6 5 12/07/2017    TP 7 3 08/15/2019    TP 6 9 08/15/2019    BILITOT 0 3 12/07/2017    TBILI 0 56 08/15/2019    EGFR 89 08/15/2019       CBC with diff:       Invalid input(s):  RBC, TOTALCELLSCOUNTED, SEGS%, GRANS%, LYMPHS%, EOS%, BASO%, ABNEUT, ABGRANS, ABLYMPHS, ABMOMOS, ABEOS, ABBASO    CMP:      Invalid input(s): ALBUMIN    IMAGING:  No orders to display     No results found

## 2020-01-08 DIAGNOSIS — M81.0 AGE RELATED OSTEOPOROSIS, UNSPECIFIED PATHOLOGICAL FRACTURE PRESENCE: ICD-10-CM

## 2020-01-08 DIAGNOSIS — E03.9 HYPOTHYROIDISM, UNSPECIFIED TYPE: ICD-10-CM

## 2020-01-08 RX ORDER — ALENDRONATE SODIUM 70 MG/1
70 TABLET ORAL
Qty: 12 TABLET | Refills: 0 | Status: SHIPPED | OUTPATIENT
Start: 2020-01-08 | End: 2020-03-03

## 2020-01-08 RX ORDER — LEVOTHYROXINE SODIUM 88 UG/1
88 TABLET ORAL DAILY
Qty: 90 TABLET | Refills: 1 | Status: SHIPPED | OUTPATIENT
Start: 2020-01-08 | End: 2020-07-13

## 2020-02-20 ENCOUNTER — APPOINTMENT (OUTPATIENT)
Dept: LAB | Facility: CLINIC | Age: 70
End: 2020-02-20
Payer: MEDICARE

## 2020-02-20 DIAGNOSIS — E03.9 HYPOTHYROIDISM, UNSPECIFIED TYPE: ICD-10-CM

## 2020-02-20 DIAGNOSIS — R73.9 HYPERGLYCEMIA: ICD-10-CM

## 2020-02-20 DIAGNOSIS — D47.2 MONOCLONAL GAMMOPATHY OF UNDETERMINED SIGNIFICANCE: ICD-10-CM

## 2020-02-20 DIAGNOSIS — I10 HYPERTENSION, UNSPECIFIED TYPE: ICD-10-CM

## 2020-02-20 DIAGNOSIS — M81.0 AGE RELATED OSTEOPOROSIS, UNSPECIFIED PATHOLOGICAL FRACTURE PRESENCE: ICD-10-CM

## 2020-02-20 DIAGNOSIS — R05.9 COUGH: ICD-10-CM

## 2020-02-20 DIAGNOSIS — D36.9 TUBULAR ADENOMA: ICD-10-CM

## 2020-02-20 DIAGNOSIS — E78.5 HYPERLIPIDEMIA, UNSPECIFIED HYPERLIPIDEMIA TYPE: ICD-10-CM

## 2020-02-20 LAB
ALBUMIN SERPL BCP-MCNC: 4 G/DL (ref 3.5–5)
ALP SERPL-CCNC: 46 U/L (ref 46–116)
ALT SERPL W P-5'-P-CCNC: 22 U/L (ref 12–78)
ANION GAP SERPL CALCULATED.3IONS-SCNC: 8 MMOL/L (ref 4–13)
AST SERPL W P-5'-P-CCNC: 13 U/L (ref 5–45)
BASOPHILS # BLD AUTO: 0.03 THOUSANDS/ΜL (ref 0–0.1)
BASOPHILS NFR BLD AUTO: 1 % (ref 0–1)
BILIRUB SERPL-MCNC: 0.44 MG/DL (ref 0.2–1)
BUN SERPL-MCNC: 15 MG/DL (ref 5–25)
CALCIUM SERPL-MCNC: 9.1 MG/DL (ref 8.3–10.1)
CHLORIDE SERPL-SCNC: 107 MMOL/L (ref 100–108)
CHOLEST SERPL-MCNC: 239 MG/DL (ref 50–200)
CO2 SERPL-SCNC: 27 MMOL/L (ref 21–32)
CREAT SERPL-MCNC: 0.52 MG/DL (ref 0.6–1.3)
EOSINOPHIL # BLD AUTO: 0.05 THOUSAND/ΜL (ref 0–0.61)
EOSINOPHIL NFR BLD AUTO: 1 % (ref 0–6)
ERYTHROCYTE [DISTWIDTH] IN BLOOD BY AUTOMATED COUNT: 13 % (ref 11.6–15.1)
EST. AVERAGE GLUCOSE BLD GHB EST-MCNC: 105 MG/DL
GFR SERPL CREATININE-BSD FRML MDRD: 98 ML/MIN/1.73SQ M
GLUCOSE P FAST SERPL-MCNC: 91 MG/DL (ref 65–99)
HBA1C MFR BLD: 5.3 %
HCT VFR BLD AUTO: 39.9 % (ref 34.8–46.1)
HDLC SERPL-MCNC: 103 MG/DL
HGB BLD-MCNC: 13 G/DL (ref 11.5–15.4)
IMM GRANULOCYTES # BLD AUTO: 0.01 THOUSAND/UL (ref 0–0.2)
IMM GRANULOCYTES NFR BLD AUTO: 0 % (ref 0–2)
LDLC SERPL CALC-MCNC: 123 MG/DL (ref 0–100)
LDLC SERPL DIRECT ASSAY-MCNC: 113 MG/DL (ref 0–100)
LYMPHOCYTES # BLD AUTO: 1.4 THOUSANDS/ΜL (ref 0.6–4.47)
LYMPHOCYTES NFR BLD AUTO: 35 % (ref 14–44)
MCH RBC QN AUTO: 30.2 PG (ref 26.8–34.3)
MCHC RBC AUTO-ENTMCNC: 32.6 G/DL (ref 31.4–37.4)
MCV RBC AUTO: 93 FL (ref 82–98)
MONOCYTES # BLD AUTO: 0.32 THOUSAND/ΜL (ref 0.17–1.22)
MONOCYTES NFR BLD AUTO: 8 % (ref 4–12)
NEUTROPHILS # BLD AUTO: 2.21 THOUSANDS/ΜL (ref 1.85–7.62)
NEUTS SEG NFR BLD AUTO: 55 % (ref 43–75)
NRBC BLD AUTO-RTO: 0 /100 WBCS
PLATELET # BLD AUTO: 216 THOUSANDS/UL (ref 149–390)
PMV BLD AUTO: 12.2 FL (ref 8.9–12.7)
POTASSIUM SERPL-SCNC: 4 MMOL/L (ref 3.5–5.3)
PROT SERPL-MCNC: 7.2 G/DL (ref 6.4–8.2)
RBC # BLD AUTO: 4.31 MILLION/UL (ref 3.81–5.12)
SODIUM SERPL-SCNC: 142 MMOL/L (ref 136–145)
T3FREE SERPL-MCNC: 2.42 PG/ML (ref 2.3–4.2)
T4 FREE SERPL-MCNC: 1.09 NG/DL (ref 0.76–1.46)
TRIGL SERPL-MCNC: 66 MG/DL
TSH SERPL DL<=0.05 MIU/L-ACNC: 3.11 UIU/ML (ref 0.36–3.74)
WBC # BLD AUTO: 4.02 THOUSAND/UL (ref 4.31–10.16)

## 2020-02-20 PROCEDURE — 85025 COMPLETE CBC W/AUTO DIFF WBC: CPT

## 2020-02-20 PROCEDURE — 84165 PROTEIN E-PHORESIS SERUM: CPT

## 2020-02-20 PROCEDURE — 36415 COLL VENOUS BLD VENIPUNCTURE: CPT

## 2020-02-20 PROCEDURE — 84443 ASSAY THYROID STIM HORMONE: CPT

## 2020-02-20 PROCEDURE — 80061 LIPID PANEL: CPT

## 2020-02-20 PROCEDURE — 84165 PROTEIN E-PHORESIS SERUM: CPT | Performed by: PATHOLOGY

## 2020-02-20 PROCEDURE — 83721 ASSAY OF BLOOD LIPOPROTEIN: CPT

## 2020-02-20 PROCEDURE — 83036 HEMOGLOBIN GLYCOSYLATED A1C: CPT

## 2020-02-20 PROCEDURE — 83883 ASSAY NEPHELOMETRY NOT SPEC: CPT

## 2020-02-20 PROCEDURE — 84166 PROTEIN E-PHORESIS/URINE/CSF: CPT

## 2020-02-20 PROCEDURE — 84166 PROTEIN E-PHORESIS/URINE/CSF: CPT | Performed by: PATHOLOGY

## 2020-02-20 PROCEDURE — 80053 COMPREHEN METABOLIC PANEL: CPT

## 2020-02-20 PROCEDURE — 84439 ASSAY OF FREE THYROXINE: CPT

## 2020-02-20 PROCEDURE — 84481 FREE ASSAY (FT-3): CPT

## 2020-02-21 LAB
KAPPA LC FREE SER-MCNC: 11.1 MG/L (ref 3.3–19.4)
KAPPA LC FREE/LAMBDA FREE SER: 1.04 {RATIO} (ref 0.26–1.65)
LAMBDA LC FREE SERPL-MCNC: 10.7 MG/L (ref 5.7–26.3)

## 2020-02-22 LAB
ALBUMIN SERPL ELPH-MCNC: 4.54 G/DL (ref 3.5–5)
ALBUMIN SERPL ELPH-MCNC: 64.9 % (ref 52–65)
ALBUMIN UR ELPH-MCNC: 100 %
ALPHA1 GLOB MFR UR ELPH: 0 %
ALPHA1 GLOB SERPL ELPH-MCNC: 0.32 G/DL (ref 0.1–0.4)
ALPHA1 GLOB SERPL ELPH-MCNC: 4.6 % (ref 2.5–5)
ALPHA2 GLOB MFR UR ELPH: 0 %
ALPHA2 GLOB SERPL ELPH-MCNC: 0.71 G/DL (ref 0.4–1.2)
ALPHA2 GLOB SERPL ELPH-MCNC: 10.1 % (ref 7–13)
B-GLOBULIN MFR UR ELPH: 0 %
BETA GLOB ABNORMAL SERPL ELPH-MCNC: 0.4 G/DL (ref 0.4–0.8)
BETA1 GLOB SERPL ELPH-MCNC: 5.7 % (ref 5–13)
BETA2 GLOB SERPL ELPH-MCNC: 4 % (ref 2–8)
BETA2+GAMMA GLOB SERPL ELPH-MCNC: 0.28 G/DL (ref 0.2–0.5)
GAMMA GLOB ABNORMAL SERPL ELPH-MCNC: 0.75 G/DL (ref 0.5–1.6)
GAMMA GLOB MFR UR ELPH: 0 %
GAMMA GLOB SERPL ELPH-MCNC: 10.7 % (ref 12–22)
IGG/ALB SER: 1.85 {RATIO} (ref 1.1–1.8)
M PROTEIN 1 MFR SERPL ELPH: 2.1 %
M PROTEIN 1 SERPL ELPH-MCNC: 0.15 G/DL
PROT PATTERN SERPL ELPH-IMP: ABNORMAL
PROT PATTERN UR ELPH-IMP: NORMAL
PROT SERPL-MCNC: 7 G/DL (ref 6.4–8.2)
PROT UR-MCNC: 6 MG/DL

## 2020-03-03 ENCOUNTER — OFFICE VISIT (OUTPATIENT)
Dept: INTERNAL MEDICINE CLINIC | Facility: CLINIC | Age: 70
End: 2020-03-03
Payer: MEDICARE

## 2020-03-03 ENCOUNTER — TRANSCRIBE ORDERS (OUTPATIENT)
Dept: ADMINISTRATIVE | Facility: HOSPITAL | Age: 70
End: 2020-03-03

## 2020-03-03 VITALS
SYSTOLIC BLOOD PRESSURE: 150 MMHG | TEMPERATURE: 98.7 F | HEART RATE: 67 BPM | OXYGEN SATURATION: 98 % | BODY MASS INDEX: 25.32 KG/M2 | HEIGHT: 60 IN | RESPIRATION RATE: 14 BRPM | DIASTOLIC BLOOD PRESSURE: 85 MMHG | WEIGHT: 129 LBS

## 2020-03-03 DIAGNOSIS — E03.9 HYPOTHYROIDISM, UNSPECIFIED TYPE: ICD-10-CM

## 2020-03-03 DIAGNOSIS — I10 HYPERTENSION, UNSPECIFIED TYPE: ICD-10-CM

## 2020-03-03 DIAGNOSIS — E78.5 HYPERLIPIDEMIA, UNSPECIFIED HYPERLIPIDEMIA TYPE: Primary | ICD-10-CM

## 2020-03-03 DIAGNOSIS — Z12.31 ENCOUNTER FOR SCREENING MAMMOGRAM FOR MALIGNANT NEOPLASM OF BREAST: ICD-10-CM

## 2020-03-03 DIAGNOSIS — Z12.31 ENCOUNTER FOR SCREENING MAMMOGRAM FOR MALIGNANT NEOPLASM OF BREAST: Primary | ICD-10-CM

## 2020-03-03 DIAGNOSIS — Z12.39 SCREENING FOR BREAST CANCER: ICD-10-CM

## 2020-03-03 DIAGNOSIS — N60.19 FIBROCYSTIC BREAST DISEASE (FCBD), UNSPECIFIED LATERALITY: ICD-10-CM

## 2020-03-03 DIAGNOSIS — D47.2 MONOCLONAL GAMMOPATHY OF UNDETERMINED SIGNIFICANCE: ICD-10-CM

## 2020-03-03 DIAGNOSIS — D36.9 TUBULAR ADENOMA: ICD-10-CM

## 2020-03-03 DIAGNOSIS — Z00.00 MEDICARE ANNUAL WELLNESS VISIT, SUBSEQUENT: ICD-10-CM

## 2020-03-03 DIAGNOSIS — M81.0 AGE RELATED OSTEOPOROSIS, UNSPECIFIED PATHOLOGICAL FRACTURE PRESENCE: ICD-10-CM

## 2020-03-03 PROBLEM — R05.9 COUGH: Status: RESOLVED | Noted: 2019-08-21 | Resolved: 2020-03-03

## 2020-03-03 PROCEDURE — G0438 PPPS, INITIAL VISIT: HCPCS | Performed by: INTERNAL MEDICINE

## 2020-03-03 PROCEDURE — 99214 OFFICE O/P EST MOD 30 MIN: CPT | Performed by: INTERNAL MEDICINE

## 2020-03-03 PROCEDURE — 1036F TOBACCO NON-USER: CPT | Performed by: INTERNAL MEDICINE

## 2020-03-03 PROCEDURE — 93000 ELECTROCARDIOGRAM COMPLETE: CPT | Performed by: INTERNAL MEDICINE

## 2020-03-03 PROCEDURE — 1170F FXNL STATUS ASSESSED: CPT | Performed by: INTERNAL MEDICINE

## 2020-03-03 PROCEDURE — 3077F SYST BP >= 140 MM HG: CPT | Performed by: INTERNAL MEDICINE

## 2020-03-03 PROCEDURE — 3008F BODY MASS INDEX DOCD: CPT | Performed by: INTERNAL MEDICINE

## 2020-03-03 PROCEDURE — 3079F DIAST BP 80-89 MM HG: CPT | Performed by: INTERNAL MEDICINE

## 2020-03-03 PROCEDURE — 4040F PNEUMOC VAC/ADMIN/RCVD: CPT | Performed by: INTERNAL MEDICINE

## 2020-03-03 PROCEDURE — 1160F RVW MEDS BY RX/DR IN RCRD: CPT | Performed by: INTERNAL MEDICINE

## 2020-03-03 PROCEDURE — 1125F AMNT PAIN NOTED PAIN PRSNT: CPT | Performed by: INTERNAL MEDICINE

## 2020-03-03 RX ORDER — VITAMIN E 268 MG
400 CAPSULE ORAL DAILY
Start: 2020-03-03

## 2020-03-03 NOTE — PROGRESS NOTES
Assessment/Plan:  Month rescue inhaler , unpleasant to tast, prolia stop foasa    1 osteoporosis has been on Fosamax for several years would like to stop it due to possible side effects and bed taste in her mouth will arrange to get Prolia if cost effective will be getting laboratory work twice a year  2  Hypothyroidism is at goal  3  Patient with hypertension with some white coat syndrome her blood pressure has been excellent and home continue to monitor her at home blood pressure less than 130/80 is the goal  4  Monoclonal gammopathy of under determine significance is stable continued follow-up with Dr Warrick Galeazzi  5  Hyperlipidemia with elevated HDL will continue statin I will be seeing her back in 6 months           Diagnoses and all orders for this visit:    Hyperlipidemia, unspecified hyperlipidemia type  -     TSH, 3rd generation with Free T4 reflex; Future  -     Comprehensive metabolic panel; Future  -     CBC and differential; Future    Medicare annual wellness visit, subsequent  -     TSH, 3rd generation with Free T4 reflex; Future  -     Comprehensive metabolic panel; Future  -     CBC and differential; Future    Age related osteoporosis, unspecified pathological fracture presence  -     denosumab (PROLIA) 60 mg/mL; Inject 1 mL (60 mg total) under the skin once for 1 dose  -     TSH, 3rd generation with Free T4 reflex; Future  -     Comprehensive metabolic panel; Future  -     CBC and differential; Future    Tubular adenoma  -     TSH, 3rd generation with Free T4 reflex; Future  -     Comprehensive metabolic panel; Future  -     CBC and differential; Future    Monoclonal gammopathy of undetermined significance IgG lambda  -     TSH, 3rd generation with Free T4 reflex; Future  -     Comprehensive metabolic panel; Future  -     CBC and differential; Future    Hypertension, unspecified type  -     POCT ECG; Future  -     TSH, 3rd generation with Free T4 reflex;  Future  -     Comprehensive metabolic panel; Future  -     CBC and differential; Future    Hypothyroidism, unspecified type  -     TSH, 3rd generation with Free T4 reflex; Future  -     Comprehensive metabolic panel; Future  -     CBC and differential; Future    Fibrocystic breast disease (FCBD), unspecified laterality  -     vitamin E, tocopherol, 400 units capsule; Take 1 capsule (400 Units total) by mouth daily  -     TSH, 3rd generation with Free T4 reflex; Future  -     Comprehensive metabolic panel; Future  -     CBC and differential; Future    Screening for breast cancer  -     Mammo screening bilateral w cad; Future  -     TSH, 3rd generation with Free T4 reflex; Future  -     Comprehensive metabolic panel; Future  -     CBC and differential; Future    Encounter for screening mammogram for malignant neoplasm of breast   -     Mammo screening bilateral w cad; Future        The patient was counseled regarding instructions for management, risk factor reductions, patient and family education,impressions, risks and benefits of treatment options, side effects of medications, importance of compliance with treatment  The treatment plan was reviewed with the patient/guardian and patient/guardian understands and agrees with the treatment plan              Current Outpatient Medications:     albuterol (PROAIR HFA) 90 mcg/act inhaler, Inhale 2 puffs every 4 (four) hours as needed for wheezing or shortness of breath (cough), Disp: 1 Inhaler, Rfl: 5    aspirin 81 MG tablet, Take 1 tablet by mouth daily, Disp: , Rfl:     Cholecalciferol (VITAMIN D3) 2000 units capsule, daily, Disp: 100 capsule, Rfl: 0    denosumab (PROLIA) 60 mg/mL, Inject 1 mL (60 mg total) under the skin once for 1 dose, Disp: 1 mL, Rfl: 0    fexofenadine (ALLEGRA) 180 MG tablet, 0 5 in AM, Disp: 30 tablet, Rfl: 2    Flaxseed, Linseed, (FLAXSEED OIL) 1000 MG CAPS, Take 2 capsules by mouth daily, Disp: , Rfl:     levothyroxine 88 mcg tablet, Take 1 tablet (88 mcg total) by mouth daily, Disp: 90 tablet, Rfl: 1    lisinopril (ZESTRIL) 30 mg tablet, Take 1 tablet (30 mg total) by mouth daily, Disp: 90 tablet, Rfl: 2    Peak Flow Meter CORNEILA, by Does not apply route, Disp: , Rfl:     pravastatin (PRAVACHOL) 80 mg tablet, Take 1 tablet (80 mg total) by mouth daily, Disp: 90 tablet, Rfl: 2    vitamin E, tocopherol, 400 units capsule, Take 1 capsule (400 Units total) by mouth daily, Disp: , Rfl:     Subjective:      Patient ID: Precious Peña is a 71 y o  female  Tolerating medication well blood pressures at home have been between 130/80 and 115/73      The following portions of the patient's history were reviewed and updated as appropriate:   She has a past medical history of Pneumonia and Reactive airway disease  ,  does not have any pertinent problems on file  ,   has a past surgical history that includes Neck surgery (10/2009); Neuroplasty / transposition median nerve at carpal tunnel; and Total thyroidectomy  ,  family history includes Breast cancer (age of onset: 79) in her paternal grandmother; Diabetes in her mother; Heart disease in her father; Hypertension in her mother  ,   reports that she quit smoking about 9 years ago  She has never used smokeless tobacco  She reports that she does not drink alcohol or use drugs  ,  has No Known Allergies       Review of Systems   Constitutional: Negative for appetite change, chills, fatigue, fever and unexpected weight change  HENT: Negative for congestion, ear pain, facial swelling, hearing loss, mouth sores, nosebleeds, postnasal drip, rhinorrhea, sinus pain, sore throat, trouble swallowing and voice change  Eyes: Negative for pain, discharge, redness and visual disturbance  Respiratory: Negative for apnea, chest tightness, shortness of breath, wheezing and stridor  Cardiovascular: Negative for chest pain, palpitations and leg swelling     Gastrointestinal: Negative for abdominal distention, abdominal pain, blood in stool, constipation, diarrhea and vomiting  Endocrine: Negative for cold intolerance, heat intolerance, polydipsia, polyphagia and polyuria  Genitourinary: Negative for difficulty urinating, dysuria, flank pain, frequency, genital sores, hematuria and urgency  Musculoskeletal: Negative for arthralgias and back pain  Skin: Negative for rash and wound  Allergic/Immunologic: Negative for environmental allergies, food allergies and immunocompromised state  Neurological: Negative for dizziness, tremors, seizures, syncope, facial asymmetry, speech difficulty, weakness, light-headedness, numbness and headaches  Hematological: Negative for adenopathy  Does not bruise/bleed easily  Psychiatric/Behavioral: Negative for agitation, behavioral problems, dysphoric mood, hallucinations, self-injury, sleep disturbance and suicidal ideas  The patient is not hyperactive  Objective:  /85 (BP Location: Left arm, Patient Position: Sitting)   Pulse 67   Temp 98 7 °F (37 1 °C)   Resp 14   Ht 5' (1 524 m)   Wt 58 5 kg (129 lb)   SpO2 98%   BMI 25 19 kg/m²     Lab Review  Appointment on 02/20/2020   Component Date Value    A/G Ratio 02/20/2020 1 85*    Albumin Electrophoresis 02/20/2020 64 9     Albumin CONC 02/20/2020 4 54     Alpha 1 02/20/2020 4 6     ALPHA 1 CONC 02/20/2020 0 32     Alpha 2 02/20/2020 10 1     ALPHA 2 CONC 02/20/2020 0 71     Beta-1 02/20/2020 5 7     BETA 1 CONC 02/20/2020 0 40     Beta-2 02/20/2020 4 0     BETA 2 CONC 02/20/2020 0 28     Gamma Globulin 02/20/2020 10 7*    GAMMA CONC 02/20/2020 0 75     M Peak ID 1 02/20/2020 2 10     M PEAK 1 CONC 02/20/2020 0 15     Total Protein 02/20/2020 7 0     SPEP Interpretation 02/20/2020 The SPEP shows a monoclonal peak in the gamma region  Previous immunofixation on 8/15/19 identified a monoclonal band as IgG lambda   Reviewed by:  Smiley Cid MD **Electronic Signature**     Urine Protein 02/20/2020 6 0     Albumin ELP, Urine 02/20/2020 100 0     Alpha 1, Urine 02/20/2020 0 0     Alpha 2, Urine 02/20/2020 0 0     Beta, Urine 02/20/2020 0 0     Gamma Globulin, Urine 02/20/2020 0 0     UPEP Interp 02/20/2020 No monoclonal bands noted  Reviewed by:  Samuel Beltran MD **Electronic Signature**     WBC 02/20/2020 4 02*    RBC 02/20/2020 4 31     Hemoglobin 02/20/2020 13 0     Hematocrit 02/20/2020 39 9     MCV 02/20/2020 93     MCH 02/20/2020 30 2     MCHC 02/20/2020 32 6     RDW 02/20/2020 13 0     MPV 02/20/2020 12 2     Platelets 61/04/0861 216     nRBC 02/20/2020 0     Neutrophils Relative 02/20/2020 55     Immat GRANS % 02/20/2020 0     Lymphocytes Relative 02/20/2020 35     Monocytes Relative 02/20/2020 8     Eosinophils Relative 02/20/2020 1     Basophils Relative 02/20/2020 1     Neutrophils Absolute 02/20/2020 2 21     Immature Grans Absolute 02/20/2020 0 01     Lymphocytes Absolute 02/20/2020 1 40     Monocytes Absolute 02/20/2020 0 32     Eosinophils Absolute 02/20/2020 0 05     Basophils Absolute 02/20/2020 0 03     Sodium 02/20/2020 142     Potassium 02/20/2020 4 0     Chloride 02/20/2020 107     CO2 02/20/2020 27     ANION GAP 02/20/2020 8     BUN 02/20/2020 15     Creatinine 02/20/2020 0 52*    Glucose, Fasting 02/20/2020 91     Calcium 02/20/2020 9 1     AST 02/20/2020 13     ALT 02/20/2020 22     Alkaline Phosphatase 02/20/2020 46     Total Protein 02/20/2020 7 2     Albumin 02/20/2020 4 0     Total Bilirubin 02/20/2020 0 44     eGFR 02/20/2020 98     Hemoglobin A1C 02/20/2020 5 3     EAG 02/20/2020 105     Cholesterol 02/20/2020 239*    Triglycerides 02/20/2020 66     HDL, Direct 02/20/2020 103     LDL Calculated 02/20/2020 123*    LDL Direct 02/20/2020 113*    T3, Free 02/20/2020 2 42     Free T4 02/20/2020 1 09     TSH 3RD GENERATON 02/20/2020 3  110     Ig [place] Free Light Chain 02/20/2020 11 1     Ig Lambda Free Light Michelle* 02/20/2020 10 7     Kappa/Lambda FluidC Ratio 02/20/2020 1 04 Imaging  @[email or web address]@     Mammo screening bilateral w cad    (Results Pending)     No results found for this or any previous visit  Physical Exam   Constitutional: She is oriented to person, place, and time  She appears well-developed  HENT:   Right Ear: External ear normal    Left Ear: External ear normal    Eyes: Right eye exhibits no discharge  Left eye exhibits no discharge  No scleral icterus  Neck: Carotid bruit is not present  No tracheal deviation present  No thyroid mass and no thyromegaly present  Cardiovascular: Normal rate, regular rhythm, normal heart sounds and intact distal pulses  Exam reveals no gallop and no friction rub  No murmur heard  Pulmonary/Chest: No respiratory distress  She has no wheezes  She has no rales  She exhibits no mass  Right breast exhibits no inverted nipple, no mass, no nipple discharge, no skin change and no tenderness  Left breast exhibits no inverted nipple, no mass, no nipple discharge, no skin change and no tenderness  No breast swelling, tenderness, discharge or bleeding  Breasts are symmetrical    Musculoskeletal: She exhibits no edema  Lymphadenopathy:     She has no cervical adenopathy  Neurological: She is alert and oriented to person, place, and time  Coordination normal    Psychiatric: She has a normal mood and affect  Her behavior is normal  Judgment and thought content normal    Nursing note and vitals reviewed

## 2020-03-03 NOTE — PROGRESS NOTES
Assessment and Plan:     Problem List Items Addressed This Visit     None           Preventive health issues were discussed with patient, and age appropriate screening tests were ordered as noted in patient's After Visit Summary  Personalized health advice and appropriate referrals for health education or preventive services given if needed, as noted in patient's After Visit Summary       History of Present Illness:     Patient presents for Medicare Annual Wellness visit    Patient Care Team:  Vee Aguilar DO as PCP - General     Problem List:     Patient Active Problem List   Diagnosis    Fibrocystic breast disease    Hyperglycemia    Hyperlipidemia    Hypertension    Hypothyroidism    Mammogram abnormal    Osteoporosis    Tubular adenoma    Chronic rhinitis    Screening for malignant neoplasm of colon    Cough    Monoclonal gammopathy of undetermined significance IgG lambda      Past Medical and Surgical History:     Past Medical History:   Diagnosis Date    Pneumonia     RESOLVED: [id number]    Reactive airway disease     RESOLVED: [id number]     Past Surgical History:   Procedure Laterality Date    NECK SURGERY  10/2009    LESION BIOPSY OF NECK MASS    NEUROPLASTY / TRANSPOSITION MEDIAN NERVE AT CARPAL TUNNEL      TOTAL THYROIDECTOMY      RESOLVED: [id number]      Family History:     Family History   Problem Relation Age of Onset    Diabetes Mother     Hypertension Mother     Heart disease Father     Breast cancer Paternal Grandmother 79      Social History:        Social History     Socioeconomic History    Marital status: /Civil Union     Spouse name: None    Number of children: None    Years of education: None    Highest education level: None   Occupational History    None   Social Needs    Financial resource strain: None    Food insecurity:     Worry: None     Inability: None    Transportation needs:     Medical: None     Non-medical: None   Tobacco Use    Smoking status: Former Smoker     Last attempt to quit: 7/3/2010     Years since quittin 6    Smokeless tobacco: Never Used   Substance and Sexual Activity    Alcohol use: No    Drug use: No    Sexual activity: Yes   Lifestyle    Physical activity:     Days per week: None     Minutes per session: None    Stress: None   Relationships    Social connections:     Talks on phone: None     Gets together: None     Attends Caodaism service: None     Active member of club or organization: None     Attends meetings of clubs or organizations: None     Relationship status: None    Intimate partner violence:     Fear of current or ex partner: None     Emotionally abused: None     Physically abused: None     Forced sexual activity: None   Other Topics Concern    None   Social History Narrative    Caffeine use       Medications and Allergies:     Current Outpatient Medications   Medication Sig Dispense Refill    albuterol (PROAIR HFA) 90 mcg/act inhaler Inhale 2 puffs every 4 (four) hours as needed for wheezing or shortness of breath (cough) 1 Inhaler 5    alendronate (FOSAMAX) 70 mg tablet Take 1 tablet (70 mg total) by mouth every 7 days 12 tablet 0    aspirin 81 MG tablet Take 1 tablet by mouth daily      Cholecalciferol (VITAMIN D3) 2000 units capsule daily 100 capsule 0    fexofenadine (ALLEGRA) 180 MG tablet 0 5 in AM 30 tablet 2    Flaxseed, Linseed, (FLAXSEED OIL) 1000 MG CAPS Take 2 capsules by mouth daily      levothyroxine 88 mcg tablet Take 1 tablet (88 mcg total) by mouth daily 90 tablet 1    lisinopril (ZESTRIL) 30 mg tablet Take 1 tablet (30 mg total) by mouth daily 90 tablet 2    Peak Flow Meter CORNELIA by Does not apply route      pravastatin (PRAVACHOL) 80 mg tablet Take 1 tablet (80 mg total) by mouth daily 90 tablet 2    vitamin E, tocopherol, 400 units capsule Take 1 capsule by mouth 3 (three) times a day       No current facility-administered medications for this visit        No Known Allergies Immunizations:     Immunization History   Administered Date(s) Administered    DT (pediatric) 01/01/2010    INFLUENZA 10/10/2012, 10/10/2013, 10/01/2014, 09/25/2015, 09/29/2016, 09/09/2017, 09/02/2018    Influenza TIV (IM) 09/14/2011, 10/01/2012, 09/25/2015    Pneumococcal Conjugate 13-Valent 09/25/2015    Pneumococcal Polysaccharide PPV23 07/03/2014    Varicella 01/01/2012    Zoster Vaccine Recombinant 07/05/2018, 09/06/2018      Health Maintenance:         Topic Date Due    MAMMOGRAM  04/15/2020    CRC Screening: Colonoscopy  08/21/2020 (Originally 6/4/2016)    DXA SCAN  12/03/2020    CRC Screening: Cologuard  08/26/2022    Hepatitis C Screening  Completed         Topic Date Due    DTaP,Tdap,and Td Vaccines (1 - Tdap) 09[birth year]/1961    Pneumococcal Vaccine: 65+ Years (2 of 2 - PPSV23) 07/03/2019      Medicare Health Risk Assessment:     Pulse 67   Temp 98 7 °F (37 1 °C)   Resp 14   Ht 5' (1 524 m)   Wt 58 5 kg (129 lb)   SpO2 98%   BMI 25 19 kg/m²      Mazin Arms is here for her Initial Wellness visit  Last Medicare Wellness visit information reviewed, patient interviewed and updates made to the record today  Health Risk Assessment:   Patient rates overall health as very good  Patient feels that their physical health rating is same  Eyesight was rated as same  Patient feels that their emotional and mental health rating is much better  Pain experienced in the last 7 days has been none  Patient states that she has experienced no weight loss or gain in last 6 months  Depression Screening:   PHQ-2 Score: 0      Fall Risk Screening: In the past year, patient has experienced: no history of falling in past year      Urinary Incontinence Screening:   Patient has not leaked urine accidently in the last six months  Home Safety:  Patient does not have trouble with stairs inside or outside of their home  Patient has working smoke alarms and has working carbon monoxide detector   Home safety hazards include: none  Nutrition:   Current diet is Regular, Low Saturated Fat and Low Cholesterol  Medications:   Patient is currently taking over-the-counter supplements  OTC medications include: see medication list  Patient is able to manage medications  Activities of Daily Living (ADLs)/Instrumental Activities of Daily Living (IADLs):   Walk and transfer into and out of bed and chair?: Yes  Dress and groom yourself?: Yes    Bathe or shower yourself?: Yes    Feed yourself? Yes  Do your laundry/housekeeping?: Yes  Manage your money, pay your bills and track your expenses?: Yes  Make your own meals?: Yes    Do your own shopping?: Yes    Previous Hospitalizations:   Any hospitalizations or ED visits within the last 12 months?: No      Advance Care Planning:   Living will: Yes    Durable POA for healthcare:  Yes    Advanced directive: Yes    Advanced directive counseling given: Yes    Five wishes given: Yes    Patient declined ACP directive: No    End of Life Decisions reviewed with patient: Yes    Provider agrees with end of life decisions: Yes      PREVENTIVE SCREENINGS      Cardiovascular Screening:    General: Screening Not Indicated, History Lipid Disorder and Screening Current      Diabetes Screening:     General: Screening Current      Colorectal Cancer Screening:     General: Screening Current      Breast Cancer Screening:     General: Screening Current      Cervical Cancer Screening:    General: Screening Not Indicated      Osteoporosis Screening:    General: Screening Not Indicated, History Osteoporosis and Screening Current      Abdominal Aortic Aneurysm (AAA) Screening:        General: Screening Not Indicated      Lung Cancer Screening:     General: Screening Not Indicated      Hepatitis C Screening:    General: Screening Current      Pat Pinzon, DO

## 2020-04-03 ENCOUNTER — TELEPHONE (OUTPATIENT)
Dept: HEMATOLOGY ONCOLOGY | Facility: CLINIC | Age: 70
End: 2020-04-03

## 2020-04-17 ENCOUNTER — TELEMEDICINE (OUTPATIENT)
Dept: HEMATOLOGY ONCOLOGY | Facility: CLINIC | Age: 70
End: 2020-04-17
Payer: MEDICARE

## 2020-04-17 DIAGNOSIS — D47.2 MGUS (MONOCLONAL GAMMOPATHY OF UNKNOWN SIGNIFICANCE): Primary | ICD-10-CM

## 2020-04-17 PROCEDURE — 99443 PR PHYS/QHP TELEPHONE EVALUATION 21-30 MIN: CPT | Performed by: INTERNAL MEDICINE

## 2020-04-27 DIAGNOSIS — E78.5 HYPERLIPIDEMIA, UNSPECIFIED HYPERLIPIDEMIA TYPE: ICD-10-CM

## 2020-04-27 RX ORDER — PRAVASTATIN SODIUM 80 MG/1
80 TABLET ORAL DAILY
Qty: 90 TABLET | Refills: 2 | Status: SHIPPED | OUTPATIENT
Start: 2020-04-27 | End: 2021-02-15

## 2020-06-17 ENCOUNTER — HOSPITAL ENCOUNTER (OUTPATIENT)
Dept: MAMMOGRAPHY | Facility: CLINIC | Age: 70
Discharge: HOME | End: 2020-06-17
Payer: MEDICARE

## 2020-06-17 VITALS — HEIGHT: 60 IN | WEIGHT: 129 LBS | BODY MASS INDEX: 25.32 KG/M2

## 2020-06-17 DIAGNOSIS — Z12.31 ENCOUNTER FOR SCREENING MAMMOGRAM FOR MALIGNANT NEOPLASM OF BREAST: ICD-10-CM

## 2020-06-17 PROCEDURE — 77067 SCR MAMMO BI INCL CAD: CPT

## 2020-06-17 PROCEDURE — 77063 BREAST TOMOSYNTHESIS BI: CPT

## 2020-07-03 DIAGNOSIS — I10 HYPERTENSION, UNSPECIFIED TYPE: ICD-10-CM

## 2020-07-03 RX ORDER — LISINOPRIL 30 MG/1
TABLET ORAL
Qty: 90 TABLET | Refills: 2 | Status: SHIPPED | OUTPATIENT
Start: 2020-07-03 | End: 2020-07-11 | Stop reason: SDUPTHER

## 2020-07-11 DIAGNOSIS — E03.9 HYPOTHYROIDISM, UNSPECIFIED TYPE: ICD-10-CM

## 2020-07-11 DIAGNOSIS — I10 HYPERTENSION, UNSPECIFIED TYPE: ICD-10-CM

## 2020-07-13 RX ORDER — LEVOTHYROXINE SODIUM 88 UG/1
TABLET ORAL
Qty: 90 TABLET | Refills: 1 | Status: SHIPPED | OUTPATIENT
Start: 2020-07-13 | End: 2021-01-11

## 2020-07-13 RX ORDER — LISINOPRIL 30 MG/1
30 TABLET ORAL DAILY
Qty: 90 TABLET | Refills: 0 | Status: SHIPPED | OUTPATIENT
Start: 2020-07-13 | End: 2021-06-23 | Stop reason: SDUPTHER

## 2020-07-13 RX ORDER — LEVOTHYROXINE SODIUM 88 UG/1
88 TABLET ORAL DAILY
Qty: 90 TABLET | Refills: 0 | Status: SHIPPED | OUTPATIENT
Start: 2020-07-13 | End: 2021-03-31 | Stop reason: SDUPTHER

## 2020-09-10 ENCOUNTER — APPOINTMENT (OUTPATIENT)
Dept: LAB | Facility: HOSPITAL | Age: 70
End: 2020-09-10
Attending: INTERNAL MEDICINE
Payer: MEDICARE

## 2020-09-10 DIAGNOSIS — M81.0 AGE RELATED OSTEOPOROSIS, UNSPECIFIED PATHOLOGICAL FRACTURE PRESENCE: ICD-10-CM

## 2020-09-10 DIAGNOSIS — N60.19 FIBROCYSTIC BREAST DISEASE (FCBD), UNSPECIFIED LATERALITY: ICD-10-CM

## 2020-09-10 DIAGNOSIS — E03.9 HYPOTHYROIDISM, UNSPECIFIED TYPE: ICD-10-CM

## 2020-09-10 DIAGNOSIS — D47.2 MONOCLONAL GAMMOPATHY OF UNDETERMINED SIGNIFICANCE: ICD-10-CM

## 2020-09-10 DIAGNOSIS — I10 HYPERTENSION, UNSPECIFIED TYPE: ICD-10-CM

## 2020-09-10 DIAGNOSIS — E78.5 HYPERLIPIDEMIA, UNSPECIFIED HYPERLIPIDEMIA TYPE: ICD-10-CM

## 2020-09-10 DIAGNOSIS — Z00.00 MEDICARE ANNUAL WELLNESS VISIT, SUBSEQUENT: ICD-10-CM

## 2020-09-10 DIAGNOSIS — D36.9 TUBULAR ADENOMA: ICD-10-CM

## 2020-09-10 DIAGNOSIS — Z12.39 SCREENING FOR BREAST CANCER: ICD-10-CM

## 2020-09-10 LAB
ALBUMIN SERPL BCP-MCNC: 4 G/DL (ref 3.5–5)
ALP SERPL-CCNC: 49 U/L (ref 46–116)
ALT SERPL W P-5'-P-CCNC: 25 U/L (ref 12–78)
ANION GAP SERPL CALCULATED.3IONS-SCNC: 5 MMOL/L (ref 4–13)
AST SERPL W P-5'-P-CCNC: 12 U/L (ref 5–45)
BASOPHILS # BLD AUTO: 0.03 THOUSANDS/ΜL (ref 0–0.1)
BASOPHILS NFR BLD AUTO: 1 % (ref 0–1)
BILIRUB SERPL-MCNC: 0.4 MG/DL (ref 0.2–1)
BUN SERPL-MCNC: 12 MG/DL (ref 5–25)
CALCIUM SERPL-MCNC: 8.6 MG/DL (ref 8.3–10.1)
CHLORIDE SERPL-SCNC: 104 MMOL/L (ref 100–108)
CO2 SERPL-SCNC: 30 MMOL/L (ref 21–32)
CREAT SERPL-MCNC: 0.67 MG/DL (ref 0.6–1.3)
EOSINOPHIL # BLD AUTO: 0.07 THOUSAND/ΜL (ref 0–0.61)
EOSINOPHIL NFR BLD AUTO: 1 % (ref 0–6)
ERYTHROCYTE [DISTWIDTH] IN BLOOD BY AUTOMATED COUNT: 13.4 % (ref 11.6–15.1)
GFR SERPL CREATININE-BSD FRML MDRD: 90 ML/MIN/1.73SQ M
GLUCOSE P FAST SERPL-MCNC: 93 MG/DL (ref 65–99)
HCT VFR BLD AUTO: 39.4 % (ref 34.8–46.1)
HGB BLD-MCNC: 12.8 G/DL (ref 11.5–15.4)
IMM GRANULOCYTES # BLD AUTO: 0.02 THOUSAND/UL (ref 0–0.2)
IMM GRANULOCYTES NFR BLD AUTO: 0 % (ref 0–2)
LYMPHOCYTES # BLD AUTO: 1.61 THOUSANDS/ΜL (ref 0.6–4.47)
LYMPHOCYTES NFR BLD AUTO: 32 % (ref 14–44)
MCH RBC QN AUTO: 30 PG (ref 26.8–34.3)
MCHC RBC AUTO-ENTMCNC: 32.5 G/DL (ref 31.4–37.4)
MCV RBC AUTO: 93 FL (ref 82–98)
MONOCYTES # BLD AUTO: 0.39 THOUSAND/ΜL (ref 0.17–1.22)
MONOCYTES NFR BLD AUTO: 8 % (ref 4–12)
NEUTROPHILS # BLD AUTO: 2.9 THOUSANDS/ΜL (ref 1.85–7.62)
NEUTS SEG NFR BLD AUTO: 58 % (ref 43–75)
NRBC BLD AUTO-RTO: 0 /100 WBCS
PLATELET # BLD AUTO: 241 THOUSANDS/UL (ref 149–390)
PMV BLD AUTO: 11.1 FL (ref 8.9–12.7)
POTASSIUM SERPL-SCNC: 4.8 MMOL/L (ref 3.5–5.3)
PROT SERPL-MCNC: 7.3 G/DL (ref 6.4–8.2)
RBC # BLD AUTO: 4.26 MILLION/UL (ref 3.81–5.12)
SODIUM SERPL-SCNC: 139 MMOL/L (ref 136–145)
TSH SERPL DL<=0.05 MIU/L-ACNC: 2.48 UIU/ML (ref 0.36–3.74)
WBC # BLD AUTO: 5.02 THOUSAND/UL (ref 4.31–10.16)

## 2020-09-10 PROCEDURE — 36415 COLL VENOUS BLD VENIPUNCTURE: CPT

## 2020-09-10 PROCEDURE — 85025 COMPLETE CBC W/AUTO DIFF WBC: CPT

## 2020-09-10 PROCEDURE — 80053 COMPREHEN METABOLIC PANEL: CPT

## 2020-09-10 PROCEDURE — 84443 ASSAY THYROID STIM HORMONE: CPT

## 2020-09-16 ENCOUNTER — OFFICE VISIT (OUTPATIENT)
Dept: INTERNAL MEDICINE CLINIC | Facility: CLINIC | Age: 70
End: 2020-09-16
Payer: MEDICARE

## 2020-09-16 ENCOUNTER — TELEPHONE (OUTPATIENT)
Dept: INTERNAL MEDICINE CLINIC | Facility: CLINIC | Age: 70
End: 2020-09-16

## 2020-09-16 VITALS
HEART RATE: 71 BPM | TEMPERATURE: 98.2 F | WEIGHT: 128 LBS | DIASTOLIC BLOOD PRESSURE: 82 MMHG | BODY MASS INDEX: 25.13 KG/M2 | RESPIRATION RATE: 16 BRPM | OXYGEN SATURATION: 96 % | SYSTOLIC BLOOD PRESSURE: 140 MMHG | HEIGHT: 60 IN

## 2020-09-16 DIAGNOSIS — Z23 NEED FOR PNEUMOCOCCAL VACCINATION: ICD-10-CM

## 2020-09-16 DIAGNOSIS — R05.9 COUGH: ICD-10-CM

## 2020-09-16 DIAGNOSIS — I10 HYPERTENSION, UNSPECIFIED TYPE: Primary | ICD-10-CM

## 2020-09-16 DIAGNOSIS — E78.5 HYPERLIPIDEMIA, UNSPECIFIED HYPERLIPIDEMIA TYPE: Primary | ICD-10-CM

## 2020-09-16 DIAGNOSIS — R73.9 HYPERGLYCEMIA: ICD-10-CM

## 2020-09-16 DIAGNOSIS — D47.2 MONOCLONAL GAMMOPATHY OF UNDETERMINED SIGNIFICANCE: ICD-10-CM

## 2020-09-16 DIAGNOSIS — E78.5 HYPERLIPIDEMIA, UNSPECIFIED HYPERLIPIDEMIA TYPE: ICD-10-CM

## 2020-09-16 DIAGNOSIS — E03.9 HYPOTHYROIDISM, UNSPECIFIED TYPE: ICD-10-CM

## 2020-09-16 DIAGNOSIS — M81.0 AGE RELATED OSTEOPOROSIS, UNSPECIFIED PATHOLOGICAL FRACTURE PRESENCE: ICD-10-CM

## 2020-09-16 PROCEDURE — G0009 ADMIN PNEUMOCOCCAL VACCINE: HCPCS

## 2020-09-16 PROCEDURE — 90732 PPSV23 VACC 2 YRS+ SUBQ/IM: CPT

## 2020-09-16 PROCEDURE — 99213 OFFICE O/P EST LOW 20 MIN: CPT | Performed by: INTERNAL MEDICINE

## 2020-09-16 RX ORDER — ALBUTEROL SULFATE 90 UG/1
2 AEROSOL, METERED RESPIRATORY (INHALATION) EVERY 4 HOURS PRN
Qty: 1 INHALER | Refills: 5 | Status: SHIPPED | OUTPATIENT
Start: 2020-09-16 | End: 2021-05-11 | Stop reason: SDUPTHER

## 2020-09-16 NOTE — PROGRESS NOTES
Assessment/Plan:     1 hypertension patient's blood pressures at home have been good her machine has been checked here at the office and is accurate does have some white coat syndrome blood pressures reviewed at home have been excellent  2  Hypothyroidism is at goal will need follow-up in 6 months thyroid function tests at that time  3  Patient with hyperlipidemia lipid panel was at goal in February will need lipid panel in 6 months  4  Osteoporosis awaiting Prolia           Diagnoses and all orders for this visit:    Hypertension, unspecified type    Hypothyroidism, unspecified type    BMI 25 0-25 9,adult    Cough  -     albuterol (ProAir HFA) 90 mcg/act inhaler; Inhale 2 puffs every 4 (four) hours as needed for wheezing or shortness of breath (cough)    Hyperlipidemia, unspecified hyperlipidemia type    Monoclonal gammopathy of undetermined significance IgG lambda    Age related osteoporosis, unspecified pathological fracture presence        The patient was counseled regarding instructions for management, risk factor reductions, patient and family education,impressions, risks and benefits of treatment options, side effects of medications, importance of compliance with treatment  The treatment plan was reviewed with the patient/guardian and patient/guardian understands and agrees with the treatment plan              Current Outpatient Medications:     albuterol (ProAir HFA) 90 mcg/act inhaler, Inhale 2 puffs every 4 (four) hours as needed for wheezing or shortness of breath (cough), Disp: 1 Inhaler, Rfl: 5    aspirin 81 MG tablet, Take 1 tablet by mouth daily, Disp: , Rfl:     Cholecalciferol (VITAMIN D3) 2000 units capsule, daily, Disp: 100 capsule, Rfl: 0    denosumab (PROLIA) 60 mg/mL, Inject 1 mL (60 mg total) under the skin once for 1 dose, Disp: 1 mL, Rfl: 0    fexofenadine (ALLEGRA) 180 MG tablet, 0 5 in AM, Disp: 30 tablet, Rfl: 2    Flaxseed, Linseed, (FLAXSEED OIL) 1000 MG CAPS, Take 2 capsules by mouth daily, Disp: , Rfl:     levothyroxine 88 mcg tablet, TAKE 1 TABLET(88 MCG) BY MOUTH DAILY, Disp: 90 tablet, Rfl: 1    levothyroxine 88 mcg tablet, Take 1 tablet (88 mcg total) by mouth daily, Disp: 90 tablet, Rfl: 0    lisinopril (ZESTRIL) 30 mg tablet, Take 1 tablet (30 mg total) by mouth daily, Disp: 90 tablet, Rfl: 0    Peak Flow Meter CORNELIA, by Does not apply route, Disp: , Rfl:     pravastatin (PRAVACHOL) 80 mg tablet, Take 1 tablet (80 mg total) by mouth daily, Disp: 90 tablet, Rfl: 2    vitamin E, tocopherol, 400 units capsule, Take 1 capsule (400 Units total) by mouth daily, Disp: , Rfl:     Subjective:      Patient ID: Christiano Nettles is a 71 y o  female  HPI    The following portions of the patient's history were reviewed and updated as appropriate:   She has a past medical history of Pneumonia and Reactive airway disease  ,  does not have any pertinent problems on file  ,   has a past surgical history that includes Neck surgery (10/2009); Neuroplasty / transposition median nerve at carpal tunnel; and Total thyroidectomy  ,  family history includes Breast cancer (age of onset: 79) in her paternal grandmother; Diabetes in her mother; Heart disease in her father; Hypertension in her mother; No Known Problems in her daughter and daughter  ,   reports that she quit smoking about 10 years ago  She has never used smokeless tobacco  She reports that she does not drink alcohol or use drugs  ,  has No Known Allergies       Review of Systems   Constitutional: Negative for appetite change, chills, fatigue, fever and unexpected weight change  HENT: Negative for congestion, ear pain, facial swelling, hearing loss, mouth sores, nosebleeds, postnasal drip, rhinorrhea, sinus pain, sore throat, trouble swallowing and voice change  Eyes: Negative for pain, discharge, redness and visual disturbance  Respiratory: Negative for apnea, chest tightness, shortness of breath, wheezing and stridor      Cardiovascular: Negative for chest pain, palpitations and leg swelling  Gastrointestinal: Negative for abdominal distention, abdominal pain, blood in stool, constipation, diarrhea and vomiting  Endocrine: Negative for cold intolerance, heat intolerance, polydipsia, polyphagia and polyuria  Genitourinary: Negative for difficulty urinating, dysuria, flank pain, frequency, genital sores, hematuria and urgency  Musculoskeletal: Negative for arthralgias and back pain  Skin: Negative for rash and wound  Allergic/Immunologic: Negative for environmental allergies, food allergies and immunocompromised state  Neurological: Negative for dizziness, tremors, seizures, syncope, facial asymmetry, speech difficulty, weakness, light-headedness, numbness and headaches  Hematological: Negative for adenopathy  Does not bruise/bleed easily  Psychiatric/Behavioral: Negative for agitation, behavioral problems, dysphoric mood, hallucinations, self-injury, sleep disturbance and suicidal ideas  The patient is not hyperactive            Objective:  /82 (BP Location: Left arm, Patient Position: Sitting)   Pulse 71   Temp 98 2 °F (36 8 °C) (Tympanic)   Resp 16   Ht 5' (1 524 m)   Wt 58 1 kg (128 lb)   SpO2 96%   BMI 25 00 kg/m²     Lab Review  Appointment on 09/10/2020   Component Date Value    TSH 3RD GENERATON 09/10/2020 2 482     Sodium 09/10/2020 139     Potassium 09/10/2020 4 8     Chloride 09/10/2020 104     CO2 09/10/2020 30     ANION GAP 09/10/2020 5     BUN 09/10/2020 12     Creatinine 09/10/2020 0 67     Glucose, Fasting 09/10/2020 93     Calcium 09/10/2020 8 6     AST 09/10/2020 12     ALT 09/10/2020 25     Alkaline Phosphatase 09/10/2020 49     Total Protein 09/10/2020 7 3     Albumin 09/10/2020 4 0     Total Bilirubin 09/10/2020 0 40     eGFR 09/10/2020 90     WBC 09/10/2020 5 02     RBC 09/10/2020 4 26     Hemoglobin 09/10/2020 12 8     Hematocrit 09/10/2020 39 4     MCV 09/10/2020 93     MCH 09/10/2020 30 0     MCHC 09/10/2020 32 5     RDW 09/10/2020 13 4     MPV 09/10/2020 11 1     Platelets 41/28/3027 241     nRBC 09/10/2020 0     Neutrophils Relative 09/10/2020 58     Immat GRANS % 09/10/2020 0     Lymphocytes Relative 09/10/2020 32     Monocytes Relative 09/10/2020 8     Eosinophils Relative 09/10/2020 1     Basophils Relative 09/10/2020 1     Neutrophils Absolute 09/10/2020 2 90     Immature Grans Absolute 09/10/2020 0 02     Lymphocytes Absolute 09/10/2020 1 61     Monocytes Absolute 09/10/2020 0 39     Eosinophils Absolute 09/10/2020 0 07     Basophils Absolute 09/10/2020 0 03          Imaging  @[email or web address]@     No orders to display     No results found for this or any previous visit  Physical Exam  Vitals signs and nursing note reviewed  Constitutional:       Appearance: She is well-developed  HENT:      Right Ear: External ear normal       Left Ear: External ear normal    Eyes:      General: No scleral icterus  Right eye: No discharge  Left eye: No discharge  Neck:      Thyroid: No thyroid mass or thyromegaly  Vascular: No carotid bruit  Trachea: No tracheal deviation  Cardiovascular:      Rate and Rhythm: Normal rate and regular rhythm  Heart sounds: Normal heart sounds  No murmur  No friction rub  No gallop  Pulmonary:      Effort: No respiratory distress  Breath sounds: No wheezing or rales  Lymphadenopathy:      Cervical: No cervical adenopathy  Neurological:      Mental Status: She is alert and oriented to person, place, and time  Coordination: Coordination normal    Psychiatric:         Behavior: Behavior normal          Thought Content: Thought content normal          Judgment: Judgment normal              BMI Counseling: Body mass index is 25 kg/m²   The BMI is above normal  Nutrition recommendations include reducing portion sizes, decreasing overall calorie intake, 3-5 servings of fruits/vegetables daily, reducing fast food intake, consuming healthier snacks, decreasing soda and/or juice intake, moderation in carbohydrate intake, increasing intake of lean protein and reducing intake of saturated fat and trans fat  Exercise recommendations include strength training exercises

## 2020-09-16 NOTE — TELEPHONE ENCOUNTER
Good Morning Saulo Pearl, Mrs Molly Awad is asking if labs for 6 Month check w/ [address] can be order now for 6 Month Visit in April  Patient asking Lab orders be mailed to [address]  Thank you Sir

## 2020-09-18 ENCOUNTER — CLINICAL SUPPORT (OUTPATIENT)
Dept: INTERNAL MEDICINE CLINIC | Facility: CLINIC | Age: 70
End: 2020-09-18

## 2020-09-18 DIAGNOSIS — M81.0 AGE RELATED OSTEOPOROSIS, UNSPECIFIED PATHOLOGICAL FRACTURE PRESENCE: Primary | ICD-10-CM

## 2020-09-18 DIAGNOSIS — M81.0 AGE RELATED OSTEOPOROSIS, UNSPECIFIED PATHOLOGICAL FRACTURE PRESENCE: ICD-10-CM

## 2021-01-11 DIAGNOSIS — E03.9 HYPOTHYROIDISM, UNSPECIFIED TYPE: ICD-10-CM

## 2021-01-11 RX ORDER — LEVOTHYROXINE SODIUM 88 UG/1
TABLET ORAL
Qty: 90 TABLET | Refills: 1 | Status: SHIPPED | OUTPATIENT
Start: 2021-01-11 | End: 2021-03-31 | Stop reason: DRUGHIGH

## 2021-02-14 DIAGNOSIS — E78.5 HYPERLIPIDEMIA, UNSPECIFIED HYPERLIPIDEMIA TYPE: ICD-10-CM

## 2021-02-15 RX ORDER — PRAVASTATIN SODIUM 80 MG/1
TABLET ORAL
Qty: 90 TABLET | Refills: 0 | Status: SHIPPED | OUTPATIENT
Start: 2021-02-15 | End: 2021-05-24

## 2021-03-05 DIAGNOSIS — Z23 ENCOUNTER FOR IMMUNIZATION: ICD-10-CM

## 2021-03-18 ENCOUNTER — IMMUNIZATIONS (OUTPATIENT)
Dept: FAMILY MEDICINE CLINIC | Facility: HOSPITAL | Age: 71
End: 2021-03-18

## 2021-03-18 DIAGNOSIS — Z23 ENCOUNTER FOR IMMUNIZATION: Primary | ICD-10-CM

## 2021-03-18 PROCEDURE — 0001A SARS-COV-2 / COVID-19 MRNA VACCINE (PFIZER-BIONTECH) 30 MCG: CPT

## 2021-03-18 PROCEDURE — 91300 SARS-COV-2 / COVID-19 MRNA VACCINE (PFIZER-BIONTECH) 30 MCG: CPT

## 2021-03-29 ENCOUNTER — APPOINTMENT (OUTPATIENT)
Dept: LAB | Facility: HOSPITAL | Age: 71
End: 2021-03-29
Attending: INTERNAL MEDICINE
Payer: MEDICARE

## 2021-03-29 DIAGNOSIS — D47.2 MGUS (MONOCLONAL GAMMOPATHY OF UNKNOWN SIGNIFICANCE): ICD-10-CM

## 2021-03-29 DIAGNOSIS — R73.9 HYPERGLYCEMIA: ICD-10-CM

## 2021-03-29 DIAGNOSIS — E78.5 HYPERLIPIDEMIA, UNSPECIFIED HYPERLIPIDEMIA TYPE: ICD-10-CM

## 2021-03-29 DIAGNOSIS — E03.9 HYPOTHYROIDISM, UNSPECIFIED TYPE: ICD-10-CM

## 2021-03-29 LAB
ALBUMIN SERPL BCP-MCNC: 3.7 G/DL (ref 3.5–5)
ALP SERPL-CCNC: 44 U/L (ref 46–116)
ALT SERPL W P-5'-P-CCNC: 23 U/L (ref 12–78)
ANION GAP SERPL CALCULATED.3IONS-SCNC: 7 MMOL/L (ref 4–13)
AST SERPL W P-5'-P-CCNC: 12 U/L (ref 5–45)
BASOPHILS # BLD AUTO: 0.02 THOUSANDS/ΜL (ref 0–0.1)
BASOPHILS NFR BLD AUTO: 0 % (ref 0–1)
BILIRUB SERPL-MCNC: 0.35 MG/DL (ref 0.2–1)
BUN SERPL-MCNC: 13 MG/DL (ref 5–25)
CALCIUM SERPL-MCNC: 8.4 MG/DL (ref 8.3–10.1)
CHLORIDE SERPL-SCNC: 107 MMOL/L (ref 100–108)
CHOLEST SERPL-MCNC: 214 MG/DL (ref 50–200)
CO2 SERPL-SCNC: 29 MMOL/L (ref 21–32)
CREAT SERPL-MCNC: 0.6 MG/DL (ref 0.6–1.3)
EOSINOPHIL # BLD AUTO: 0.07 THOUSAND/ΜL (ref 0–0.61)
EOSINOPHIL NFR BLD AUTO: 2 % (ref 0–6)
ERYTHROCYTE [DISTWIDTH] IN BLOOD BY AUTOMATED COUNT: 12.9 % (ref 11.6–15.1)
EST. AVERAGE GLUCOSE BLD GHB EST-MCNC: 97 MG/DL
GFR SERPL CREATININE-BSD FRML MDRD: 93 ML/MIN/1.73SQ M
GLUCOSE P FAST SERPL-MCNC: 91 MG/DL (ref 65–99)
HBA1C MFR BLD: 5 %
HCT VFR BLD AUTO: 37.6 % (ref 34.8–46.1)
HDLC SERPL-MCNC: 94 MG/DL
HGB BLD-MCNC: 12.4 G/DL (ref 11.5–15.4)
IMM GRANULOCYTES # BLD AUTO: 0.01 THOUSAND/UL (ref 0–0.2)
IMM GRANULOCYTES NFR BLD AUTO: 0 % (ref 0–2)
LDLC SERPL CALC-MCNC: 106 MG/DL (ref 0–100)
LDLC SERPL DIRECT ASSAY-MCNC: 96 MG/DL (ref 0–100)
LYMPHOCYTES # BLD AUTO: 1.48 THOUSANDS/ΜL (ref 0.6–4.47)
LYMPHOCYTES NFR BLD AUTO: 31 % (ref 14–44)
MCH RBC QN AUTO: 30.2 PG (ref 26.8–34.3)
MCHC RBC AUTO-ENTMCNC: 33 G/DL (ref 31.4–37.4)
MCV RBC AUTO: 92 FL (ref 82–98)
MONOCYTES # BLD AUTO: 0.36 THOUSAND/ΜL (ref 0.17–1.22)
MONOCYTES NFR BLD AUTO: 8 % (ref 4–12)
NEUTROPHILS # BLD AUTO: 2.86 THOUSANDS/ΜL (ref 1.85–7.62)
NEUTS SEG NFR BLD AUTO: 59 % (ref 43–75)
NRBC BLD AUTO-RTO: 0 /100 WBCS
PLATELET # BLD AUTO: 210 THOUSANDS/UL (ref 149–390)
PMV BLD AUTO: 11.6 FL (ref 8.9–12.7)
POTASSIUM SERPL-SCNC: 3.7 MMOL/L (ref 3.5–5.3)
PROT SERPL-MCNC: 7 G/DL (ref 6.4–8.2)
RBC # BLD AUTO: 4.1 MILLION/UL (ref 3.81–5.12)
SODIUM SERPL-SCNC: 143 MMOL/L (ref 136–145)
T3FREE SERPL-MCNC: 2.87 PG/ML (ref 2.3–4.2)
T4 FREE SERPL-MCNC: 1.32 NG/DL (ref 0.76–1.46)
TRIGL SERPL-MCNC: 71 MG/DL
TSH SERPL DL<=0.05 MIU/L-ACNC: 0.13 UIU/ML (ref 0.36–3.74)
WBC # BLD AUTO: 4.8 THOUSAND/UL (ref 4.31–10.16)

## 2021-03-29 PROCEDURE — 83036 HEMOGLOBIN GLYCOSYLATED A1C: CPT

## 2021-03-29 PROCEDURE — 36415 COLL VENOUS BLD VENIPUNCTURE: CPT

## 2021-03-29 PROCEDURE — 86334 IMMUNOFIX E-PHORESIS SERUM: CPT

## 2021-03-29 PROCEDURE — 84481 FREE ASSAY (FT-3): CPT

## 2021-03-29 PROCEDURE — 84443 ASSAY THYROID STIM HORMONE: CPT

## 2021-03-29 PROCEDURE — 80061 LIPID PANEL: CPT

## 2021-03-29 PROCEDURE — 86334 IMMUNOFIX E-PHORESIS SERUM: CPT | Performed by: PATHOLOGY

## 2021-03-29 PROCEDURE — 83721 ASSAY OF BLOOD LIPOPROTEIN: CPT

## 2021-03-29 PROCEDURE — 85025 COMPLETE CBC W/AUTO DIFF WBC: CPT

## 2021-03-29 PROCEDURE — 84439 ASSAY OF FREE THYROXINE: CPT

## 2021-03-29 PROCEDURE — 84165 PROTEIN E-PHORESIS SERUM: CPT | Performed by: PATHOLOGY

## 2021-03-29 PROCEDURE — 83883 ASSAY NEPHELOMETRY NOT SPEC: CPT

## 2021-03-29 PROCEDURE — 80053 COMPREHEN METABOLIC PANEL: CPT

## 2021-03-29 PROCEDURE — 84165 PROTEIN E-PHORESIS SERUM: CPT

## 2021-03-30 LAB
KAPPA LC FREE SER-MCNC: 11.6 MG/L (ref 3.3–19.4)
KAPPA LC FREE/LAMBDA FREE SER: 0.97 {RATIO} (ref 0.26–1.65)
LAMBDA LC FREE SERPL-MCNC: 12 MG/L (ref 5.7–26.3)

## 2021-03-31 ENCOUNTER — TELEPHONE (OUTPATIENT)
Dept: INTERNAL MEDICINE CLINIC | Facility: CLINIC | Age: 71
End: 2021-03-31

## 2021-03-31 DIAGNOSIS — E03.9 HYPOTHYROIDISM, UNSPECIFIED TYPE: Primary | ICD-10-CM

## 2021-03-31 LAB
ALBUMIN SERPL ELPH-MCNC: 4.07 G/DL (ref 3.5–5)
ALBUMIN SERPL ELPH-MCNC: 61.6 % (ref 52–65)
ALPHA1 GLOB SERPL ELPH-MCNC: 0.34 G/DL (ref 0.1–0.4)
ALPHA1 GLOB SERPL ELPH-MCNC: 5.2 % (ref 2.5–5)
ALPHA2 GLOB SERPL ELPH-MCNC: 0.76 G/DL (ref 0.4–1.2)
ALPHA2 GLOB SERPL ELPH-MCNC: 11.5 % (ref 7–13)
BETA GLOB ABNORMAL SERPL ELPH-MCNC: 0.38 G/DL (ref 0.4–0.8)
BETA1 GLOB SERPL ELPH-MCNC: 5.8 % (ref 5–13)
BETA2 GLOB SERPL ELPH-MCNC: 4.4 % (ref 2–8)
BETA2+GAMMA GLOB SERPL ELPH-MCNC: 0.29 G/DL (ref 0.2–0.5)
GAMMA GLOB ABNORMAL SERPL ELPH-MCNC: 0.76 G/DL (ref 0.5–1.6)
GAMMA GLOB SERPL ELPH-MCNC: 11.5 % (ref 12–22)
IGG/ALB SER: 1.6 {RATIO} (ref 1.1–1.8)
INTERPRETATION UR IFE-IMP: NORMAL
M PROTEIN 1 MFR SERPL ELPH: 2.1 %
M PROTEIN 1 SERPL ELPH-MCNC: 0.14 G/DL
PROT PATTERN SERPL ELPH-IMP: ABNORMAL
PROT SERPL-MCNC: 6.6 G/DL (ref 6.4–8.2)

## 2021-03-31 RX ORDER — LEVOTHYROXINE SODIUM 0.07 MG/1
75 TABLET ORAL
Qty: 90 TABLET | Refills: 3 | Status: SHIPPED | OUTPATIENT
Start: 2021-03-31 | End: 2021-09-20 | Stop reason: SDUPTHER

## 2021-03-31 NOTE — TELEPHONE ENCOUNTER
Patient is requesting a refill on levothyroxine 88 mcg tablet, take 1 tablet by mouth daily  She looked at her lab results on my chart and wonder by her results if her dosage should be increased? Please advise        Call back #[phone]    [facility]/[place]

## 2021-04-08 ENCOUNTER — IMMUNIZATIONS (OUTPATIENT)
Dept: FAMILY MEDICINE CLINIC | Facility: HOSPITAL | Age: 71
End: 2021-04-08

## 2021-04-08 DIAGNOSIS — Z23 ENCOUNTER FOR IMMUNIZATION: Primary | ICD-10-CM

## 2021-04-08 PROCEDURE — 91300 SARS-COV-2 / COVID-19 MRNA VACCINE (PFIZER-BIONTECH) 30 MCG: CPT

## 2021-04-08 PROCEDURE — 0002A SARS-COV-2 / COVID-19 MRNA VACCINE (PFIZER-BIONTECH) 30 MCG: CPT

## 2021-04-11 DIAGNOSIS — E03.9 HYPOTHYROIDISM, UNSPECIFIED TYPE: ICD-10-CM

## 2021-04-12 RX ORDER — LEVOTHYROXINE SODIUM 88 UG/1
TABLET ORAL
Qty: 90 TABLET | Refills: 0 | Status: SHIPPED | OUTPATIENT
Start: 2021-04-12 | End: 2021-04-13

## 2021-04-13 ENCOUNTER — TELEPHONE (OUTPATIENT)
Dept: INTERNAL MEDICINE CLINIC | Facility: CLINIC | Age: 71
End: 2021-04-13

## 2021-04-13 NOTE — TELEPHONE ENCOUNTER
Question on med    Thyroid was adjusted but shes confused    So shes asking about dosages of her thyroid?      Can we see what level she should be at?

## 2021-04-15 NOTE — PROGRESS NOTES
HEMATOLOGY CLINIC NOTE    Primary Care Provider: Ruben Salazar DO  Referring Provider:    MRN: [id number]  : 1950    Assessment / Plan:     1  MGUS (monoclonal gammopathy of unknown significance)  Patient M spike continues to be stable at 0 14, K/L ratio is 0 97  Patient has no anemia, calcium, kidney functions within normal limits  Patient denies any constitutional symptoms, bone pain  Noticed patient never had a bone survey  Will obtain this to ensure no bone lesions present  Will continue checking labs below annually with follow-up in 1 year    - XR bone survey complete >12 mos; Future    - Immunoglobulin free LT chains blood; Standing  - Protein electrophoresis, serum; Standing  - CBC and differential; Standing  - Comprehensive metabolic panel; Standing    Patient will return to the office in 1 year     Patient voiced understanding and agreement to the above  The patient knows to call the office with any questions or concerns regarding the above  I have spent 30 minutes with Patient  today in which greater than 50% of this time was spent in counseling/coordination of care regarding Diagnostic results, Intructions for management, Patient and family education and Impressions  Reason for visit:       Chief Complaint   Patient presents with    Follow-up     1 year       History of Hematology Illness:     Kathyleen Fothergill is a 79 y o  female who came in for follow up regarding MGUS    1   MGUS - IgG lambda, low risk  -initial c/s 10/2019 - M spike 0 14g --> 2020 M spike 0 15g -->3/29/21 M spike 0 14g    Recent Lab review:  3/29/21: WBC 4 80, Hgb 12 4, MCV 92, platelet 075, diff WNL    Cr 0 60, BUN 13, Ca 8 4    K/L ratio 0 97  9/10/21: WBC 5 02, Hgb 12 8, MCV 93, platelet 158, diff WNL   Cr 0 67, BUN 12, Ca 8 6    Interval History:   "10/17/2019 :  Patient is a 71 year female, history of hypertension, borderline diabetes, former smoker, recently diagnosed MGUS came in to establish care with hematology      Reported overall at baseline health status no constitutional symptoms, body weight stable  No bone pain  No bleeding easy bruise      Patient is a former smoker  Has no prior hematology oncology issues in past   No family history of malignancy "    4/16/21:  Patient came in for follow-up  Offers no complaints  No constitutional symptoms, bone pain  Problem list:       Patient Active Problem List   Diagnosis    Fibrocystic breast disease    Hyperglycemia    Hyperlipidemia    Hypertension    Hypothyroidism    Mammogram abnormal    Osteoporosis    Tubular adenoma    Chronic rhinitis    Screening for malignant neoplasm of colon    Monoclonal gammopathy of undetermined significance IgG lambda    Medicare annual wellness visit, subsequent       REVIEW OF SYMPTOMS:   Review of Systems   Constitutional: Negative for activity change, appetite change, chills, diaphoresis, fatigue, fever and unexpected weight change  HENT: Negative for nosebleeds  Eyes: Negative for visual disturbance  Respiratory: Negative for apnea, cough, choking, chest tightness and shortness of breath  Cardiovascular: Negative for chest pain, palpitations and leg swelling  Gastrointestinal: Negative for abdominal pain, anal bleeding, blood in stool, constipation, diarrhea, nausea and vomiting  Endocrine: Negative for cold intolerance  Genitourinary: Negative for hematuria, menstrual problem and vaginal bleeding  Musculoskeletal: Negative for arthralgias  Skin: Negative for color change, pallor and rash  Neurological: Negative for dizziness, syncope, light-headedness and headaches  Hematological: Negative for adenopathy  Does not bruise/bleed easily  Psychiatric/Behavioral: Negative for sleep disturbance         PHYSICAL EXAMINATION:     Vital Signs:   /74 (BP Location: Right arm, Patient Position: Sitting, Cuff Size: Standard)   Pulse 76   Temp (!) 97 3 °F (36 3 °C) (Tympanic)   Resp 12   Ht 5' (1 524 m)   Wt 59 4 kg (131 lb)   BMI 25 58 kg/m²   Body surface area is 1 56 meters squared  Ht Readings from Last 8 Encounters:   04/16/21 5' (1 524 m)   09/16/20 5' (1 524 m)   06/17/20 5' (1 524 m)   03/03/20 5' (1 524 m)   10/17/19 5' (1 524 m)   08/21/19 5' (1 524 m)   04/15/19 5' (1 524 m)   01/09/19 5' (1 524 m)       Wt Readings from Last 8 Encounters:   04/16/21 59 4 kg (131 lb)   09/16/20 58 1 kg (128 lb)   06/17/20 58 5 kg (129 lb)   03/03/20 58 5 kg (129 lb)   10/17/19 58 1 kg (128 lb)   08/21/19 58 5 kg (129 lb)   04/15/19 58 5 kg (129 lb)   01/09/19 59 1 kg (130 lb 3 2 oz)          Physical Exam  Constitutional:       General: She is not in acute distress  Appearance: Normal appearance  She is not ill-appearing, toxic-appearing or diaphoretic  Comments: BP and BP recheck elevated as in vitals; However, patient states she has white coat syndrome  Patient had vitals listed from home BPs which were WNL  HENT:      Head: Normocephalic and atraumatic  Eyes:      General: No scleral icterus  Extraocular Movements: Extraocular movements intact  Conjunctiva/sclera: Conjunctivae normal       Pupils: Pupils are equal, round, and reactive to light  Neck:      Musculoskeletal: Normal range of motion and neck supple  Cardiovascular:      Rate and Rhythm: Normal rate and regular rhythm  Heart sounds: Murmur (mild systolic murmur heard at aortic area ) present  Pulmonary:      Effort: Pulmonary effort is normal  No respiratory distress  Breath sounds: Normal breath sounds  No stridor  No wheezing, rhonchi or rales  Abdominal:      Palpations: Abdomen is soft  Tenderness: There is no abdominal tenderness  Musculoskeletal: Normal range of motion  General: No tenderness  Right lower leg: No edema  Left lower leg: No edema  Lymphadenopathy:      Cervical: No cervical adenopathy  Skin:     General: Skin is warm and dry        Coloration: Skin is not jaundiced or pale  Neurological:      General: No focal deficit present  Mental Status: She is alert and oriented to person, place, and time  Cranial Nerves: No cranial nerve deficit  Motor: No weakness  Psychiatric:         Mood and Affect: Mood normal          Behavior: Behavior normal        Reviewed historical information        PAST MEDICAL HISTORY:    Past Medical History:   Diagnosis Date    Pneumonia     RESOLVED: [id number]    Reactive airway disease     RESOLVED: [id number]       PAST SURGICAL HISTORY:    Past Surgical History:   Procedure Laterality Date    NECK SURGERY  10/2009    LESION BIOPSY OF NECK MASS    NEUROPLASTY / TRANSPOSITION MEDIAN NERVE AT CARPAL TUNNEL      TOTAL THYROIDECTOMY      RESOLVED: [id number]         CURRENT MEDICATIONS:     Current Outpatient Medications:     albuterol (ProAir HFA) 90 mcg/act inhaler, Inhale 2 puffs every 4 (four) hours as needed for wheezing or shortness of breath (cough), Disp: 1 Inhaler, Rfl: 5    aspirin 81 MG tablet, Take 1 tablet by mouth daily, Disp: , Rfl:     Cholecalciferol (VITAMIN D3) 2000 units capsule, daily, Disp: 100 capsule, Rfl: 0    Flaxseed, Linseed, (FLAXSEED OIL) 1000 MG CAPS, Take 2 capsules by mouth daily, Disp: , Rfl:     levothyroxine 75 mcg tablet, Take 1 tablet (75 mcg total) by mouth daily in the early morning, Disp: 90 tablet, Rfl: 3    lisinopril (ZESTRIL) 30 mg tablet, Take 1 tablet (30 mg total) by mouth daily, Disp: 90 tablet, Rfl: 0    Peak Flow Meter CORNELIA, by Does not apply route, Disp: , Rfl:     pravastatin (PRAVACHOL) 80 mg tablet, TAKE 1 TABLET(80 MG) BY MOUTH DAILY, Disp: 90 tablet, Rfl: 0    vitamin E, tocopherol, 400 units capsule, Take 1 capsule (400 Units total) by mouth daily, Disp: , Rfl:     denosumab (PROLIA) 60 mg/mL, Inject 1 mL (60 mg total) under the skin once for 1 dose, Disp: 1 mL, Rfl: 0    SOCIAL HISTORY:    Social History     Tobacco Use    Smoking status: Former Smoker     Quit date: 7/3/2010     Years since quitting: 10 7    Smokeless tobacco: Never Used   Substance Use Topics    Alcohol use: No    Drug use: No     Tobacco Use    Smoking status: Former Smoker     Quit date: 7/3/2010     Years since quitting: 10 7    Smokeless tobacco: Never Used   Substance Use Topics    Alcohol use: No    Drug use: No      Problem Relation Age of Onset    Diabetes Mother     Hypertension Mother     Heart disease Father     Breast cancer Paternal Grandmother 79    No Known Problems Daughter     No Known Problems Daughter        ALLERGIES:  No Known Allergies      LAB:    Lab Results   Component Value Date    WBC 4 80 03/29/2021    HGB 12 4 03/29/2021    HCT 37 6 03/29/2021    MCV 92 03/29/2021     03/29/2021       Lab Results   Component Value Date     12/07/2017    SODIUM 143 03/29/2021    K 3 7 03/29/2021     03/29/2021    CO2 29 03/29/2021    AGAP 7 03/29/2021    BUN 13 03/29/2021    CREATININE 0 60 03/29/2021    GLUF 91 03/29/2021    CALCIUM 8 4 03/29/2021    AST 12 03/29/2021    ALT 23 03/29/2021    ALKPHOS 44 (L) 03/29/2021    PROT 6 5 12/07/2017    TP 6 6 03/29/2021    TP 7 0 03/29/2021    BILITOT 0 3 12/07/2017    TBILI 0 35 03/29/2021    EGFR 93 03/29/2021       IMAGING:  Mammo screening bilateral w 3d & cad  Narrative: DIAGNOSIS: Encounter for screening mammogram for malignant neoplasm of   breast     TECHNIQUE:  Digital screening mammography was performed  Computer Aided Detection   (CAD) analyzed all applicable images  COMPARISONS:  Multiple prior exams dating between 11/18/2010 and   04/15/2019  RELEVANT HISTORY:   Family Breast Cancer History: History of breast cancer in Paternal   Grandmother  Family Medical History: Family medical history includes breast cancer in   paternal grandmother  Personal History: No known relevant hormone history  No known relevant   surgical history  No known relevant medical history      The patient is scheduled in a reminder system for screening mammography  8-10% of cancers will be missed on mammography  Management of a palpable   abnormality must be based on clinical grounds  Patients will be notified   of their results via letter from our facility  Accredited by [facility] and [facility]  RISK ASSESSMENT:   5 Year Tyrer-Cuzick: 1 67 %  10 Year Tyrer-Cuzick: 3 48 %  Lifetime Tyrer-Cuzick: 5 91 %    TISSUE DENSITY:   There are scattered areas of fibroglandular density  INDICATION: Jay Thapa is a 71 y o  female presenting for screening   mammography  FINDINGS:   Bilateral  There are no suspicious masses, grouped microcalcifications or areas of   architectural distortion  The skin and nipple areolar complex are   unremarkable  Benign-appearing calcifications are noted in each breast   Impression: No mammographic evidence of malignancy  ASSESSMENT/BI-RADS CATEGORY:  Left: 2 - Benign  Right: 2 - Benign  Overall: 2 - Benign    RECOMMENDATION:       - Routine screening mammogram in 1 year for both breasts      Workstation ID: [id number]

## 2021-04-16 ENCOUNTER — OFFICE VISIT (OUTPATIENT)
Dept: HEMATOLOGY ONCOLOGY | Facility: CLINIC | Age: 71
End: 2021-04-16
Payer: MEDICARE

## 2021-04-16 VITALS
TEMPERATURE: 97.3 F | WEIGHT: 131 LBS | RESPIRATION RATE: 12 BRPM | DIASTOLIC BLOOD PRESSURE: 74 MMHG | BODY MASS INDEX: 25.72 KG/M2 | SYSTOLIC BLOOD PRESSURE: 154 MMHG | HEIGHT: 60 IN | HEART RATE: 76 BPM

## 2021-04-16 DIAGNOSIS — D47.2 MGUS (MONOCLONAL GAMMOPATHY OF UNKNOWN SIGNIFICANCE): Primary | ICD-10-CM

## 2021-04-16 PROCEDURE — 99213 OFFICE O/P EST LOW 20 MIN: CPT | Performed by: INTERNAL MEDICINE

## 2021-04-21 ENCOUNTER — OFFICE VISIT (OUTPATIENT)
Dept: INTERNAL MEDICINE CLINIC | Facility: CLINIC | Age: 71
End: 2021-04-21
Payer: MEDICARE

## 2021-04-21 VITALS
HEIGHT: 60 IN | BODY MASS INDEX: 25.52 KG/M2 | SYSTOLIC BLOOD PRESSURE: 140 MMHG | WEIGHT: 130 LBS | HEART RATE: 80 BPM | OXYGEN SATURATION: 98 % | TEMPERATURE: 98.6 F | DIASTOLIC BLOOD PRESSURE: 82 MMHG | RESPIRATION RATE: 16 BRPM

## 2021-04-21 DIAGNOSIS — M81.0 AGE RELATED OSTEOPOROSIS, UNSPECIFIED PATHOLOGICAL FRACTURE PRESENCE: Primary | ICD-10-CM

## 2021-04-21 DIAGNOSIS — Z00.00 MEDICARE ANNUAL WELLNESS VISIT, SUBSEQUENT: ICD-10-CM

## 2021-04-21 DIAGNOSIS — E78.5 HYPERLIPIDEMIA, UNSPECIFIED HYPERLIPIDEMIA TYPE: ICD-10-CM

## 2021-04-21 DIAGNOSIS — I10 HYPERTENSION, UNSPECIFIED TYPE: ICD-10-CM

## 2021-04-21 DIAGNOSIS — R73.9 HYPERGLYCEMIA: ICD-10-CM

## 2021-04-21 DIAGNOSIS — E03.9 HYPOTHYROIDISM, UNSPECIFIED TYPE: ICD-10-CM

## 2021-04-21 DIAGNOSIS — Z12.31 ENCOUNTER FOR SCREENING MAMMOGRAM FOR MALIGNANT NEOPLASM OF BREAST: ICD-10-CM

## 2021-04-21 DIAGNOSIS — Z12.11 SCREENING FOR MALIGNANT NEOPLASM OF COLON: ICD-10-CM

## 2021-04-21 PROCEDURE — 1123F ACP DISCUSS/DSCN MKR DOCD: CPT | Performed by: NURSE PRACTITIONER

## 2021-04-21 PROCEDURE — 99214 OFFICE O/P EST MOD 30 MIN: CPT | Performed by: NURSE PRACTITIONER

## 2021-04-21 PROCEDURE — G0439 PPPS, SUBSEQ VISIT: HCPCS | Performed by: NURSE PRACTITIONER

## 2021-04-21 NOTE — PROGRESS NOTES
Assessment/Plan:    1  Medicare wellness completed  2  Hypertension- Has history of white coat syndrome  Blood pressures at home 100-120s/60-70s  BP cuff at home has been checked in office  Continue current medication and call the office if BP runs high at home  3  Hyperlipidemia- At goal   4  Hypothyroidism- Not at goal  Decrease Levothyroxine to 75 mcg daily and recheck TSH in May  We will call you with results  5  Osteoporosis- Due for DEXA scan  Not interested in re-starting Prolia at this time, will revisit after DEXA is done  6  Monoclonal gammopathy of undetermined significance- Followed by hematology  Follow up in six months, labs prior  Diagnoses and all orders for this visit:    Age related osteoporosis, unspecified pathological fracture presence  -     DXA bone density spine hip and pelvis; Future    Medicare annual wellness visit, subsequent    Screening for malignant neoplasm of colon  -     Cologuard; Future    Encounter for screening mammogram for malignant neoplasm of breast  -     Mammo screening bilateral w 3d & cad; Future    Hyperglycemia  -     Comprehensive metabolic panel; Future  -     HEMOGLOBIN A1C W/ EAG ESTIMATION; Future    Hyperlipidemia, unspecified hyperlipidemia type  -     Lipid panel; Future  -     LDL cholesterol, direct; Future    Hypothyroidism, unspecified type  -     TSH, 3rd generation with Free T4 reflex; Future    Hypertension, unspecified type  -     CBC and differential; Future        The patient was counseled regarding instructions for management, risk factor reductions, patient and family education,impressions, risks and benefits of treatment options, side effects of medications, importance of compliance with treatment  The treatment plan was reviewed with the patient/guardian and patient/guardian understands and agrees with the treatment plan              Current Outpatient Medications:     albuterol (ProAir HFA) 90 mcg/act inhaler, Inhale 2 puffs every 4 (four) hours as needed for wheezing or shortness of breath (cough), Disp: 1 Inhaler, Rfl: 5    aspirin 81 MG tablet, Take 1 tablet by mouth daily, Disp: , Rfl:     Cholecalciferol (VITAMIN D3) 2000 units capsule, daily, Disp: 100 capsule, Rfl: 0    Flaxseed, Linseed, (FLAXSEED OIL) 1000 MG CAPS, Take 2 capsules by mouth daily, Disp: , Rfl:     levothyroxine 75 mcg tablet, Take 1 tablet (75 mcg total) by mouth daily in the early morning, Disp: 90 tablet, Rfl: 3    lisinopril (ZESTRIL) 30 mg tablet, Take 1 tablet (30 mg total) by mouth daily, Disp: 90 tablet, Rfl: 0    Peak Flow Meter CORNELIA, by Does not apply route, Disp: , Rfl:     pravastatin (PRAVACHOL) 80 mg tablet, TAKE 1 TABLET(80 MG) BY MOUTH DAILY, Disp: 90 tablet, Rfl: 0    vitamin E, tocopherol, 400 units capsule, Take 1 capsule (400 Units total) by mouth daily, Disp: , Rfl:     denosumab (PROLIA) 60 mg/mL, Inject 1 mL (60 mg total) under the skin once for 1 dose, Disp: 1 mL, Rfl: 0    Subjective:      Patient ID: Christiano Nettles is a 79 y o  female  Here for follow up  No complaints today  Received both Covid vaccines  The following portions of the patient's history were reviewed and updated as appropriate:   She has a past medical history of Pneumonia and Reactive airway disease  ,  does not have any pertinent problems on file  ,   has a past surgical history that includes Neck surgery (10/2009); Neuroplasty / transposition median nerve at carpal tunnel; and Total thyroidectomy  ,  family history includes Breast cancer (age of onset: 79) in her paternal grandmother; Diabetes in her mother; Heart disease in her father; Hypertension in her mother; No Known Problems in her daughter and daughter  ,   reports that she quit smoking about 10 years ago  She has never used smokeless tobacco  She reports that she does not drink alcohol or use drugs  ,  has No Known Allergies       Review of Systems   Constitutional: Negative  Respiratory: Negative  Cardiovascular: Negative  Musculoskeletal: Negative  Psychiatric/Behavioral: Negative            Objective:  /82   Pulse 80   Temp 98 6 °F (37 °C)   Resp 16   Ht 5' (1 524 m)   Wt 59 kg (130 lb)   SpO2 98%   BMI 25 39 kg/m²     Lab Review  Appointment on 03/29/2021   Component Date Value    A/G Ratio 03/29/2021 1 60     Albumin Electrophoresis 03/29/2021 61 6     Albumin CONC 03/29/2021 4 07     Alpha 1 03/29/2021 5 2*    ALPHA 1 CONC 03/29/2021 0 34     Alpha 2 03/29/2021 11 5     ALPHA 2 CONC 03/29/2021 0 76     Beta-1 03/29/2021 5 8     BETA 1 CONC 03/29/2021 0 38*    Beta-2 03/29/2021 4 4     BETA 2 CONC 03/29/2021 0 29     Gamma Globulin 03/29/2021 11 5*    GAMMA CONC 03/29/2021 0 76     M Peak ID 1 03/29/2021 2 10     M PEAK 1 CONC 03/29/2021 0 14     Total Protein 03/29/2021 6 6     SPEP Interpretation 03/29/2021 See Comment     Ig Kappa Free Light Chain 03/29/2021 11 6     Ig Lambda Free Light Michelle* 03/29/2021 12 0     Kappa/Lambda FluidC Ratio 03/29/2021 0 97     T3, Free 03/29/2021 2 87     Free T4 03/29/2021 1 32     TSH 3RD GENERATON 03/29/2021 0 128*    WBC 03/29/2021 4 80     RBC 03/29/2021 4 10     Hemoglobin 03/29/2021 12 4     Hematocrit 03/29/2021 37 6     MCV 03/29/2021 92     MCH 03/29/2021 30 2     MCHC 03/29/2021 33 0     RDW 03/29/2021 12 9     MPV 03/29/2021 11 6     Platelets 52/31/9471 210     nRBC 03/29/2021 0     Neutrophils Relative 03/29/2021 59     Immat GRANS % 03/29/2021 0     Lymphocytes Relative 03/29/2021 31     Monocytes Relative 03/29/2021 8     Eosinophils Relative 03/29/2021 2     Basophils Relative 03/29/2021 0     Neutrophils Absolute 03/29/2021 2 86     Immature Grans Absolute 03/29/2021 0 01     Lymphocytes Absolute 03/29/2021 1 48     Monocytes Absolute 03/29/2021 0 36     Eosinophils Absolute 03/29/2021 0 07     Basophils Absolute 03/29/2021 0 02     Sodium 03/29/2021 143     Potassium 03/29/2021 3 7     Chloride 03/29/2021 107     CO2 03/29/2021 29     ANION GAP 03/29/2021 7     BUN 03/29/2021 13     Creatinine 03/29/2021 0 60     Glucose, Fasting 03/29/2021 91     Calcium 03/29/2021 8 4     AST 03/29/2021 12     ALT 03/29/2021 23     Alkaline Phosphatase 03/29/2021 44*    Total Protein 03/29/2021 7 0     Albumin 03/29/2021 3 7     Total Bilirubin 03/29/2021 0 35     eGFR 03/29/2021 93     Hemoglobin A1C 03/29/2021 5 0     EAG 03/29/2021 97     LDL Direct 03/29/2021 96     Cholesterol 03/29/2021 214*    Triglycerides 03/29/2021 71     HDL, Direct 03/29/2021 94     LDL Calculated 03/29/2021 106*    Immunofixation Interpret* 03/29/2021 See Comment         Imaging: No results found  Physical Exam  Constitutional:       Appearance: She is well-developed  Cardiovascular:      Rate and Rhythm: Normal rate and regular rhythm  Heart sounds: Normal heart sounds  Pulmonary:      Effort: Pulmonary effort is normal       Breath sounds: Normal breath sounds  Musculoskeletal: Normal range of motion  Neurological:      Mental Status: She is alert and oriented to person, place, and time  Deep Tendon Reflexes: Reflexes are normal and symmetric  Psychiatric:         Behavior: Behavior normal          Thought Content:  Thought content normal          Judgment: Judgment normal

## 2021-04-21 NOTE — PROGRESS NOTES
Assessment and Plan:     Problem List Items Addressed This Visit        Musculoskeletal and Integument    Osteoporosis - Primary    Relevant Orders    DXA bone density spine hip and pelvis       Other    Screening for malignant neoplasm of colon    Relevant Orders    Cologuard    Medicare annual wellness visit, subsequent    Encounter for screening mammogram for malignant neoplasm of breast    Relevant Orders    Mammo screening bilateral w 3d & cad        BMI Counseling: Body mass index is 25 39 kg/m²  The BMI is above normal  Nutrition recommendations include decreasing portion sizes, encouraging healthy choices of fruits and vegetables, decreasing fast food intake, consuming healthier snacks, limiting drinks that contain sugar, moderation in carbohydrate intake, increasing intake of lean protein, reducing intake of saturated and trans fat and reducing intake of cholesterol  Exercise recommendations include exercising 3-5 times per week  No pharmacotherapy was ordered  Patient referred to PCP due to patient being overweight  Preventive health issues were discussed with patient, and age appropriate screening tests were ordered as noted in patient's After Visit Summary  Personalized health advice and appropriate referrals for health education or preventive services given if needed, as noted in patient's After Visit Summary       History of Present Illness:     Patient presents for Medicare Annual Wellness visit    Patient Care Team:  Richelle Abbott as PCP - General (Internal Medicine)     Problem List:     Patient Active Problem List   Diagnosis    Fibrocystic breast disease    Hyperglycemia    Hyperlipidemia    Hypertension    Hypothyroidism    Mammogram abnormal    Osteoporosis    Tubular adenoma    Chronic rhinitis    Screening for malignant neoplasm of colon    Monoclonal gammopathy of undetermined significance IgG lambda    Medicare annual wellness visit, subsequent    Encounter for screening mammogram for malignant neoplasm of breast      Past Medical and Surgical History:     Past Medical History:   Diagnosis Date    Pneumonia     RESOLVED: [id number]    Reactive airway disease     RESOLVED: [id number]     Past Surgical History:   Procedure Laterality Date    NECK SURGERY  10/2009    LESION BIOPSY OF NECK MASS    NEUROPLASTY / TRANSPOSITION MEDIAN NERVE AT CARPAL TUNNEL      TOTAL THYROIDECTOMY      RESOLVED: [id number]      Family History:     Family History   Problem Relation Age of Onset    Diabetes Mother     Hypertension Mother     Heart disease Father     Breast cancer Paternal Grandmother 79    No Known Problems Daughter     No Known Problems Daughter       Social History:        Social History     Socioeconomic History    Marital status: /Civil Union     Spouse name: None    Number of children: None    Years of education: None    Highest education level: None   Occupational History    None   Social Needs    Financial resource strain: None    Food insecurity     Worry: None     Inability: None    Transportation needs     Medical: None     Non-medical: None   Tobacco Use    Smoking status: Former Smoker     Quit date: 7/3/2010     Years since quitting: 10 8    Smokeless tobacco: Never Used   Substance and Sexual Activity    Alcohol use: No    Drug use: No    Sexual activity: Yes   Lifestyle    Physical activity     Days per week: None     Minutes per session: None    Stress: None   Relationships    Social connections     Talks on phone: None     Gets together: None     Attends [employer] service: None     Active member of club or organization: None     Attends meetings of clubs or organizations: None     Relationship status: None    Intimate partner violence     Fear of current or ex partner: None     Emotionally abused: None     Physically abused: None     Forced sexual activity: None   Other Topics Concern    None   Social History Narrative    Caffeine use       Medications and Allergies:     Current Outpatient Medications   Medication Sig Dispense Refill    albuterol (ProAir HFA) 90 mcg/act inhaler Inhale 2 puffs every 4 (four) hours as needed for wheezing or shortness of breath (cough) 1 Inhaler 5    aspirin 81 MG tablet Take 1 tablet by mouth daily      Cholecalciferol (VITAMIN D3) 2000 units capsule daily 100 capsule 0    Flaxseed, Linseed, (FLAXSEED OIL) 1000 MG CAPS Take 2 capsules by mouth daily      levothyroxine 75 mcg tablet Take 1 tablet (75 mcg total) by mouth daily in the early morning 90 tablet 3    lisinopril (ZESTRIL) 30 mg tablet Take 1 tablet (30 mg total) by mouth daily 90 tablet 0    Peak Flow Meter CORNELIA by Does not apply route      pravastatin (PRAVACHOL) 80 mg tablet TAKE 1 TABLET(80 MG) BY MOUTH DAILY 90 tablet 0    vitamin E, tocopherol, 400 units capsule Take 1 capsule (400 Units total) by mouth daily      denosumab (PROLIA) 60 mg/mL Inject 1 mL (60 mg total) under the skin once for 1 dose 1 mL 0     No current facility-administered medications for this visit        No Known Allergies   Immunizations:     Immunization History   Administered Date(s) Administered    DT (pediatric) 01/01/2010    INFLUENZA 10/10/2012, 10/10/2013, 10/01/2014, 09/25/2015, 09/29/2016, 09/09/2017, 09/02/2018    Influenza, high dose seasonal 0 7 mL 08/26/2020    Influenza, seasonal, injectable 09/14/2011, 10/01/2012, 09/25/2015    Pneumococcal Conjugate 13-Valent 09/25/2015    Pneumococcal Polysaccharide PPV23 07/03/2014, 09/16/2020    SARS-CoV-2 / COVID-19 mRNA IM (Pfizer-BioNTech) 03/18/2021, 04/08/2021    Varicella 01/01/2012    Zoster Vaccine Recombinant 07/05/2018, 09/06/2018    influenza, trivalent, adjuvanted 09/19/2019      Health Maintenance:         Topic Date Due    Colonoscopy Surveillance  06/04/2016    DXA SCAN  12/03/2020    MAMMOGRAM  06/17/2021    Colorectal Cancer Screening  06/04/2023    Hepatitis C Screening  Completed Topic Date Due    DTaP,Tdap,and Td Vaccines (1 - Tdap) 09[birth year]17/1971      Medicare Health Risk Assessment:     /82   Pulse 80   Temp 98 6 °F (37 °C)   Resp 16   Ht 5' (1 524 m)   Wt 59 kg (130 lb)   SpO2 98%   BMI 25 39 kg/m²      Shirley Carmen is here for her Subsequent Wellness visit  Last Medicare Wellness visit information reviewed, patient interviewed and updates made to the record today  Health Risk Assessment:   Patient rates overall health as good  Patient feels that their physical health rating is same  Patient is satisfied with their life  Eyesight was rated as same  Hearing was rated as same  Patient feels that their emotional and mental health rating is same  Patients states they are never, rarely angry  Patient states they are never, rarely unusually tired/fatigued  Pain experienced in the last 7 days has been none  Patient states that she has experienced no weight loss or gain in last 6 months  Depression Screening:   PHQ-2 Score: 0      Fall Risk Screening: In the past year, patient has experienced: no history of falling in past year      Urinary Incontinence Screening:   Patient has not leaked urine accidently in the last six months  Home Safety:  Patient does not have trouble with stairs inside or outside of their home  Patient has working smoke alarms and has working carbon monoxide detector  Home safety hazards include: none  Nutrition:   Current diet is Regular  Medications:   Patient is currently taking over-the-counter supplements  OTC medications include: see medication list  Patient is able to manage medications  Activities of Daily Living (ADLs)/Instrumental Activities of Daily Living (IADLs):   Walk and transfer into and out of bed and chair?: Yes  Dress and groom yourself?: Yes    Bathe or shower yourself?: Yes    Feed yourself?  Yes  Do your laundry/housekeeping?: Yes  Manage your money, pay your bills and track your expenses?: Yes  Make your own meals?: Yes    Do your own shopping?: Yes    Previous Hospitalizations:   Any hospitalizations or ED visits within the last 12 months?: No      Advance Care Planning:   Living will: Yes    Durable POA for healthcare:  Yes    Advanced directive: Yes    Advanced directive counseling given: Yes    Five wishes given: Yes    Patient declined ACP directive: No      PREVENTIVE SCREENINGS      Cardiovascular Screening:    General: Screening Not Indicated and History Lipid Disorder      Diabetes Screening:     General: Screening Current      Colorectal Cancer Screening:     General: Screening Current      Breast Cancer Screening:     General: Screening Current      Cervical Cancer Screening:    General: Screening Not Indicated      Osteoporosis Screening:    General: Screening Not Indicated and History Osteoporosis      Lung Cancer Screening:     General: Screening Not Indicated      Hepatitis C Screening:    General: Screening Current    Screening, Brief Intervention, and Referral to Treatment (SBIRT)    Screening  Typical number of drinks in a day: 0    Single Item Drug Screening:  How often have you used an illegal drug (including marijuana) or a prescription medication for non-medical reasons in the past year? never    Single Item Drug Screen Score: 0  Interpretation: Negative screen for possible drug use disorder      SESAR Barker

## 2021-04-21 NOTE — PATIENT INSTRUCTIONS
1  Medicare wellness completed  2  Hypertension- Has history of white coat syndrome  Blood pressures at home 100-120s/60-70s  BP cuff at home has been checked in office  Continue current medication and call the office if BP runs high at home  3  Hyperlipidemia- At goal   4  Hypothyroidism- Not at goal  Decrease Levothyroxine to 75 mcg daily and recheck TSH in May  We will call you with results  5  Osteoporosis- Due for DEXA scan  Not interested in re-starting Prolia at this time, will revisit after DEXA is done  6  Monoclonal gammopathy of undetermined significance- Followed by hematology  Follow up in six months, labs prior

## 2021-05-11 DIAGNOSIS — R05.9 COUGH: ICD-10-CM

## 2021-05-12 RX ORDER — ALBUTEROL SULFATE 90 UG/1
2 AEROSOL, METERED RESPIRATORY (INHALATION) EVERY 4 HOURS PRN
Qty: 8.5 G | Refills: 2 | Status: SHIPPED | OUTPATIENT
Start: 2021-05-12 | End: 2021-12-13

## 2021-05-21 DIAGNOSIS — E78.5 HYPERLIPIDEMIA, UNSPECIFIED HYPERLIPIDEMIA TYPE: ICD-10-CM

## 2021-05-24 RX ORDER — PRAVASTATIN SODIUM 80 MG/1
TABLET ORAL
Qty: 90 TABLET | Refills: 0 | Status: SHIPPED | OUTPATIENT
Start: 2021-05-24 | End: 2021-09-20 | Stop reason: SDUPTHER

## 2021-06-03 ENCOUNTER — APPOINTMENT (OUTPATIENT)
Dept: LAB | Facility: HOSPITAL | Age: 71
End: 2021-06-03
Payer: MEDICARE

## 2021-06-03 DIAGNOSIS — E03.9 HYPOTHYROIDISM, UNSPECIFIED TYPE: ICD-10-CM

## 2021-06-03 LAB — TSH SERPL DL<=0.05 MIU/L-ACNC: 0.53 UIU/ML (ref 0.36–3.74)

## 2021-06-03 PROCEDURE — 36415 COLL VENOUS BLD VENIPUNCTURE: CPT

## 2021-06-03 PROCEDURE — 84443 ASSAY THYROID STIM HORMONE: CPT

## 2021-06-09 ENCOUNTER — TELEPHONE (OUTPATIENT)
Dept: INTERNAL MEDICINE CLINIC | Facility: CLINIC | Age: 71
End: 2021-06-09

## 2021-06-09 NOTE — TELEPHONE ENCOUNTER
Pt says aj says shes not due until aug 2022,   Please cx lab aj order for now       She will redo in aug next year

## 2021-06-21 ENCOUNTER — HOSPITAL ENCOUNTER (OUTPATIENT)
Dept: MAMMOGRAPHY | Facility: CLINIC | Age: 71
Discharge: HOME | End: 2021-06-21
Payer: MEDICARE

## 2021-06-21 VITALS — WEIGHT: 130 LBS | BODY MASS INDEX: 25.52 KG/M2 | HEIGHT: 60 IN

## 2021-06-21 DIAGNOSIS — M81.0 AGE RELATED OSTEOPOROSIS, UNSPECIFIED PATHOLOGICAL FRACTURE PRESENCE: ICD-10-CM

## 2021-06-21 DIAGNOSIS — Z12.31 ENCOUNTER FOR SCREENING MAMMOGRAM FOR MALIGNANT NEOPLASM OF BREAST: ICD-10-CM

## 2021-06-21 PROCEDURE — 77067 SCR MAMMO BI INCL CAD: CPT

## 2021-06-21 PROCEDURE — 77080 DXA BONE DENSITY AXIAL: CPT

## 2021-06-21 PROCEDURE — 77063 BREAST TOMOSYNTHESIS BI: CPT

## 2021-06-23 ENCOUNTER — OFFICE VISIT (OUTPATIENT)
Dept: INTERNAL MEDICINE CLINIC | Facility: CLINIC | Age: 71
End: 2021-06-23
Payer: MEDICARE

## 2021-06-23 VITALS
SYSTOLIC BLOOD PRESSURE: 140 MMHG | WEIGHT: 130 LBS | HEART RATE: 71 BPM | DIASTOLIC BLOOD PRESSURE: 82 MMHG | TEMPERATURE: 98 F | HEIGHT: 60 IN | OXYGEN SATURATION: 98 % | BODY MASS INDEX: 25.52 KG/M2

## 2021-06-23 DIAGNOSIS — I10 HYPERTENSION, UNSPECIFIED TYPE: ICD-10-CM

## 2021-06-23 DIAGNOSIS — M81.0 AGE RELATED OSTEOPOROSIS, UNSPECIFIED PATHOLOGICAL FRACTURE PRESENCE: Primary | ICD-10-CM

## 2021-06-23 PROCEDURE — 99214 OFFICE O/P EST MOD 30 MIN: CPT | Performed by: NURSE PRACTITIONER

## 2021-06-23 RX ORDER — LISINOPRIL 30 MG/1
30 TABLET ORAL DAILY
Qty: 90 TABLET | Refills: 0 | Status: SHIPPED | OUTPATIENT
Start: 2021-06-23 | End: 2021-06-28

## 2021-06-23 NOTE — PATIENT INSTRUCTIONS
1  Osteoporosis- Will re-initiate Prolia  Weight-bearing exercise recommended  Recent Calcium level within normal range  2  Elevated BP- Does have a touch of white-coat HTN  BP at home within normal range  Continue Lisinopril at current dose  Follow up as scheduled in October or sooner if needed

## 2021-06-23 NOTE — PROGRESS NOTES
Assessment/Plan:    1  Osteoporosis- Will re-initiate Prolia  Weight-bearing exercise recommended  Recent Calcium level within normal range  2  Elevated BP- Does have a touch of white-coat HTN  BP at home within normal range  Continue Lisinopril at current dose  Follow up as scheduled in October or sooner if needed  Diagnoses and all orders for this visit:    Age related osteoporosis, unspecified pathological fracture presence  -     Discontinue: denosumab (PROLIA) subcutaneous injection 60 mg  -     denosumab (PROLIA) 60 mg/mL; Inject 1 mL (60 mg total) under the skin once for 1 dose    Hypertension, unspecified type  -     lisinopril (ZESTRIL) 30 mg tablet; Take 1 tablet (30 mg total) by mouth daily    The patient was counseled regarding instructions for management, risk factor reductions, patient and family education,impressions, risks and benefits of treatment options, side effects of medications, importance of compliance with treatment  The treatment plan was reviewed with the patient/guardian and patient/guardian understands and agrees with the treatment plan          Current Outpatient Medications:     albuterol (ProAir HFA) 90 mcg/act inhaler, Inhale 2 puffs every 4 (four) hours as needed for wheezing or shortness of breath (cough), Disp: 8 5 g, Rfl: 2    aspirin 81 MG tablet, Take 1 tablet by mouth daily, Disp: , Rfl:     Cholecalciferol (VITAMIN D3) 2000 units capsule, daily, Disp: 100 capsule, Rfl: 0    Flaxseed, Linseed, (FLAXSEED OIL) 1000 MG CAPS, Take 2 capsules by mouth daily, Disp: , Rfl:     levothyroxine 75 mcg tablet, Take 1 tablet (75 mcg total) by mouth daily in the early morning, Disp: 90 tablet, Rfl: 3    lisinopril (ZESTRIL) 30 mg tablet, Take 1 tablet (30 mg total) by mouth daily, Disp: 90 tablet, Rfl: 0    Peak Flow Meter CORNELIA, by Does not apply route, Disp: , Rfl:     pravastatin (PRAVACHOL) 80 mg tablet, TAKE 1 TABLET(80 MG) BY MOUTH DAILY, Disp: 90 tablet, Rfl: 0   vitamin E, tocopherol, 400 units capsule, Take 1 capsule (400 Units total) by mouth daily, Disp: , Rfl:     denosumab (PROLIA) 60 mg/mL, Inject 1 mL (60 mg total) under the skin once for 1 dose, Disp: 1 mL, Rfl: 0  No current facility-administered medications for this visit  Subjective:      Patient ID: Lakeisha Jenkins is a 79 y o  female  Here to discuss re-initiation of prolia  The following portions of the patient's history were reviewed and updated as appropriate:   She has a past medical history of Pneumonia and Reactive airway disease  ,  does not have any pertinent problems on file  ,   has a past surgical history that includes Neck surgery (10/2009); Neuroplasty / transposition median nerve at carpal tunnel; and Total thyroidectomy  ,  family history includes Breast cancer (age of onset: 79) in her paternal grandmother; Diabetes in her mother; Heart disease in her father; Hypertension in her mother; No Known Problems in her daughter and daughter  ,   reports that she quit smoking about 10 years ago  She has never used smokeless tobacco  She reports that she does not drink alcohol and does not use drugs  ,  has No Known Allergies       Review of Systems   Constitutional: Negative  Respiratory: Negative  Cardiovascular: Negative  Musculoskeletal: Negative  Psychiatric/Behavioral: Negative  Objective:  /82 (BP Location: Left arm, Patient Position: Sitting, Cuff Size: Adult)   Pulse 71   Temp 98 °F (36 7 °C) (Temporal)   Ht 5' (1 524 m)   Wt 59 kg (130 lb)   SpO2 98%   BMI 25 39 kg/m²     Lab Review  Appointment on 06/03/2021   Component Date Value    TSH 3RD GENERATON 06/03/2021 0 530         Imaging: DXA bone density spine hip and pelvis    Result Date: 6/21/2021  Narrative: CENTRAL  DXA SCAN CLINICAL HISTORY:   79year old post-menopausal  female  Previously treated with Prolia and Fosamax   TECHNIQUE: Bone densitometry was performed using a [employer] Software C bone densitometer  Regions of interest appear properly placed  There are no obvious fractures or other confounding variables which could limit the study  Degenerative spurring of the lumbar spine may spuriously elevate bone density  COMPARISON:  12/3/2018 RESULTS: LUMBAR SPINE:  L1-L4: BMD 0 728 gm/cm2 T-score -2 9 Z-score -0 7 LEFT TOTAL HIP: BMD 0 703 gm/cm2 T-score -2 0 Z-score -0 4 LEFT FEMORAL NECK: BMD 0 560 gm/cm2 T-score -2 6 Z-score -0 8     Impression: 1  Based on the [facility] classification, the T-score of -2 9 at the lumbar spine and -2 6 at the left femoral neck are consistent with osteoporosis  2   Since the prior study, there has been a significant increase of the BMD in the lumbar spine of 0 037 gm/cm2 or 5 4% but this may be spurious secondary to degenerative changes  In the left hip, there has been no significant change  This lumbar spine  increase  does exceed our own least significant change and, therefore, is statistically significant within 95% confidence level  3   According to the [address] [place] [address], prescription therapy is recommended with a T-score of -2 5 or less in the spine or hip after appropriate evaluation to exclude secondary causes  4   A daily intake of at least 1200 mg Calcium and 800 to 1000 IU of Vitamin D, as well as weight bearing and muscle strengthening exercise, fall prevention and avoidance of tobacco and excessive alcohol intake as  basic preventive measures are suggested  5   Repeat DXA  in 18 - 24 months, on the same machine, as clinically indicated  The 10 year risk of hip fracture is 4 1%, with the 10 year risk of major osteoporotic fracture being 15%, as calculated by the [facility] fracture risk assessment tool (FRAX)  The current NOF guidelines recommend treating patients with FRAX 10 year risk score of >3% for hip fracture and >20% for major osteoporotic fracture   Please note, the calculated FRAX score may not be accurate and may actually be lower if there has been a prior history of osteoporosis therapy  WHO CLASSIFICATION: Normal (a T-score of -1 0 or higher) Low bone mineral density (a T-score of less than -1 0 but higher than -2 5) Osteoporosis (a T-score of -2 5 or less) Severe osteoporosis (a T-score of -2 5 or less with a fragility fracture)   Workstation performed: [id number]     Mammo screening bilateral w 3d & cad    Result Date: 6/23/2021  Narrative: DIAGNOSIS: Encounter for screening mammogram for malignant neoplasm of breast TECHNIQUE: Digital screening mammography was performed  Computer Aided Detection (CAD) analyzed all applicable images  COMPARISONS: Prior breast imaging dated: 06/17/2020, 04/15/2019, 04/11/2018, 03/27/2017, 03/22/2016, and 03/13/2015 RELEVANT HISTORY: Family Breast Cancer History: History of breast cancer in Paternal Grandmother  Family Medical History: Family medical history includes breast cancer in paternal grandmother  Personal History: No known relevant hormone history  No known relevant surgical history  No known relevant medical history  The patient is scheduled in a reminder system for screening mammography  8-10% of cancers will be missed on mammography  Management of a palpable abnormality must be based on clinical grounds  Patients will be notified of their results via letter from our facility  Accredited by [employer] of Radiology and FDA  RISK ASSESSMENT: 5 Year Tyrer-Cuzick: 1 68 % 10 Year Tyrer-Cuzick: 3 55 % Lifetime Tyrer-Cuzick: 5 61 % TISSUE DENSITY: There are scattered areas of fibroglandular density  INDICATION: Derek Gramajo is a 79 y o  female presenting for screening mammography  FINDINGS: There are no suspicious masses, grouped microcalcifications or areas of architectural distortion  The skin and nipple areolar complex are unremarkable  Impression: No mammographic evidence of malignancy   ASSESSMENT/BI-RADS CATEGORY: Left: 1 - Negative Right: 1 - Negative Overall: 1 - Negative RECOMMENDATION:      - Routine screening mammogram in 1 year for both breasts  Workstation ID: [id number]          Physical Exam  Constitutional:       Appearance: She is well-developed  Cardiovascular:      Rate and Rhythm: Normal rate and regular rhythm  Heart sounds: Normal heart sounds  Pulmonary:      Effort: Pulmonary effort is normal       Breath sounds: Normal breath sounds  Musculoskeletal:         General: Normal range of motion  Neurological:      Mental Status: She is alert and oriented to person, place, and time  Deep Tendon Reflexes: Reflexes are normal and symmetric  Psychiatric:         Behavior: Behavior normal          Thought Content:  Thought content normal          Judgment: Judgment normal

## 2021-06-26 DIAGNOSIS — I10 HYPERTENSION, UNSPECIFIED TYPE: ICD-10-CM

## 2021-06-28 ENCOUNTER — DOCUMENTATION (OUTPATIENT)
Dept: INTERNAL MEDICINE CLINIC | Facility: CLINIC | Age: 71
End: 2021-06-28

## 2021-06-28 RX ORDER — LISINOPRIL 30 MG/1
TABLET ORAL
Qty: 90 TABLET | Refills: 0 | Status: SHIPPED | OUTPATIENT
Start: 2021-06-28 | End: 2021-09-20 | Stop reason: SDUPTHER

## 2021-07-07 ENCOUNTER — CLINICAL SUPPORT (OUTPATIENT)
Dept: INTERNAL MEDICINE CLINIC | Facility: CLINIC | Age: 71
End: 2021-07-07
Payer: MEDICARE

## 2021-07-07 DIAGNOSIS — M81.0 AGE RELATED OSTEOPOROSIS, UNSPECIFIED PATHOLOGICAL FRACTURE PRESENCE: Primary | ICD-10-CM

## 2021-07-07 PROCEDURE — 96372 THER/PROPH/DIAG INJ SC/IM: CPT

## 2021-07-19 ENCOUNTER — TELEPHONE (OUTPATIENT)
Dept: INTERNAL MEDICINE CLINIC | Facility: CLINIC | Age: 71
End: 2021-07-19

## 2021-07-20 NOTE — TELEPHONE ENCOUNTER
As per message I took in [place]      Note     Pt says aj says shes not due until aug 2022,   Please cx lab aj order for now       She will redo in aug next year               Order was never cancelled,    Pt not due for this yet

## 2021-07-21 ENCOUNTER — HOSPITAL ENCOUNTER (OUTPATIENT)
Dept: RADIOLOGY | Facility: HOSPITAL | Age: 71
Discharge: HOME | End: 2021-07-21
Payer: MEDICARE

## 2021-07-21 DIAGNOSIS — D47.2 MGUS (MONOCLONAL GAMMOPATHY OF UNKNOWN SIGNIFICANCE): ICD-10-CM

## 2021-07-21 PROCEDURE — 77075 RADEX OSSEOUS SURVEY COMPL: CPT

## 2021-09-20 DIAGNOSIS — E78.5 HYPERLIPIDEMIA, UNSPECIFIED HYPERLIPIDEMIA TYPE: ICD-10-CM

## 2021-09-20 DIAGNOSIS — E03.9 HYPOTHYROIDISM, UNSPECIFIED TYPE: ICD-10-CM

## 2021-09-20 DIAGNOSIS — I10 HYPERTENSION, UNSPECIFIED TYPE: ICD-10-CM

## 2021-09-20 RX ORDER — LISINOPRIL 30 MG/1
30 TABLET ORAL DAILY
Qty: 90 TABLET | Refills: 0 | Status: SHIPPED | OUTPATIENT
Start: 2021-09-20 | End: 2021-11-01 | Stop reason: SDUPTHER

## 2021-09-20 RX ORDER — PRAVASTATIN SODIUM 80 MG/1
80 TABLET ORAL DAILY
Qty: 90 TABLET | Refills: 0 | Status: SHIPPED | OUTPATIENT
Start: 2021-09-20 | End: 2021-11-01 | Stop reason: SDUPTHER

## 2021-09-20 RX ORDER — LEVOTHYROXINE SODIUM 0.07 MG/1
75 TABLET ORAL
Qty: 90 TABLET | Refills: 3 | Status: SHIPPED | OUTPATIENT
Start: 2021-09-20

## 2021-10-21 ENCOUNTER — APPOINTMENT (OUTPATIENT)
Dept: LAB | Facility: HOSPITAL | Age: 71
End: 2021-10-21
Payer: MEDICARE

## 2021-10-21 DIAGNOSIS — R73.9 HYPERGLYCEMIA: ICD-10-CM

## 2021-10-21 DIAGNOSIS — E78.5 HYPERLIPIDEMIA, UNSPECIFIED HYPERLIPIDEMIA TYPE: ICD-10-CM

## 2021-10-21 DIAGNOSIS — I10 HYPERTENSION, UNSPECIFIED TYPE: ICD-10-CM

## 2021-10-21 DIAGNOSIS — E03.9 HYPOTHYROIDISM, UNSPECIFIED TYPE: ICD-10-CM

## 2021-10-21 LAB
ALBUMIN SERPL BCP-MCNC: 3.7 G/DL (ref 3.5–5)
ALP SERPL-CCNC: 43 U/L (ref 46–116)
ALT SERPL W P-5'-P-CCNC: 21 U/L (ref 12–78)
ANION GAP SERPL CALCULATED.3IONS-SCNC: 8 MMOL/L (ref 4–13)
AST SERPL W P-5'-P-CCNC: 15 U/L (ref 5–45)
BASOPHILS # BLD AUTO: 0.02 THOUSANDS/ΜL (ref 0–0.1)
BASOPHILS NFR BLD AUTO: 1 % (ref 0–1)
BILIRUB SERPL-MCNC: 0.46 MG/DL (ref 0.2–1)
BUN SERPL-MCNC: 14 MG/DL (ref 5–25)
CALCIUM SERPL-MCNC: 8.5 MG/DL (ref 8.3–10.1)
CHLORIDE SERPL-SCNC: 106 MMOL/L (ref 100–108)
CHOLEST SERPL-MCNC: 229 MG/DL (ref 50–200)
CO2 SERPL-SCNC: 30 MMOL/L (ref 21–32)
CREAT SERPL-MCNC: 0.63 MG/DL (ref 0.6–1.3)
EOSINOPHIL # BLD AUTO: 0.06 THOUSAND/ΜL (ref 0–0.61)
EOSINOPHIL NFR BLD AUTO: 1 % (ref 0–6)
ERYTHROCYTE [DISTWIDTH] IN BLOOD BY AUTOMATED COUNT: 13.5 % (ref 11.6–15.1)
EST. AVERAGE GLUCOSE BLD GHB EST-MCNC: 111 MG/DL
GFR SERPL CREATININE-BSD FRML MDRD: 91 ML/MIN/1.73SQ M
GLUCOSE P FAST SERPL-MCNC: 93 MG/DL (ref 65–99)
HBA1C MFR BLD: 5.5 %
HCT VFR BLD AUTO: 38.9 % (ref 34.8–46.1)
HDLC SERPL-MCNC: 99 MG/DL
HGB BLD-MCNC: 12.8 G/DL (ref 11.5–15.4)
IMM GRANULOCYTES # BLD AUTO: 0.01 THOUSAND/UL (ref 0–0.2)
IMM GRANULOCYTES NFR BLD AUTO: 0 % (ref 0–2)
LDLC SERPL CALC-MCNC: 117 MG/DL (ref 0–100)
LDLC SERPL DIRECT ASSAY-MCNC: 103 MG/DL (ref 0–100)
LYMPHOCYTES # BLD AUTO: 1.42 THOUSANDS/ΜL (ref 0.6–4.47)
LYMPHOCYTES NFR BLD AUTO: 34 % (ref 14–44)
MCH RBC QN AUTO: 30 PG (ref 26.8–34.3)
MCHC RBC AUTO-ENTMCNC: 32.9 G/DL (ref 31.4–37.4)
MCV RBC AUTO: 91 FL (ref 82–98)
MONOCYTES # BLD AUTO: 0.34 THOUSAND/ΜL (ref 0.17–1.22)
MONOCYTES NFR BLD AUTO: 8 % (ref 4–12)
NEUTROPHILS # BLD AUTO: 2.3 THOUSANDS/ΜL (ref 1.85–7.62)
NEUTS SEG NFR BLD AUTO: 56 % (ref 43–75)
NONHDLC SERPL-MCNC: 130 MG/DL
NRBC BLD AUTO-RTO: 0 /100 WBCS
PLATELET # BLD AUTO: 218 THOUSANDS/UL (ref 149–390)
PMV BLD AUTO: 11.4 FL (ref 8.9–12.7)
POTASSIUM SERPL-SCNC: 4.2 MMOL/L (ref 3.5–5.3)
PROT SERPL-MCNC: 6.9 G/DL (ref 6.4–8.2)
RBC # BLD AUTO: 4.27 MILLION/UL (ref 3.81–5.12)
SODIUM SERPL-SCNC: 144 MMOL/L (ref 136–145)
TRIGL SERPL-MCNC: 63 MG/DL
TSH SERPL DL<=0.05 MIU/L-ACNC: 1.26 UIU/ML (ref 0.36–3.74)
WBC # BLD AUTO: 4.15 THOUSAND/UL (ref 4.31–10.16)

## 2021-10-21 PROCEDURE — 83036 HEMOGLOBIN GLYCOSYLATED A1C: CPT

## 2021-10-21 PROCEDURE — 83721 ASSAY OF BLOOD LIPOPROTEIN: CPT

## 2021-10-21 PROCEDURE — 36415 COLL VENOUS BLD VENIPUNCTURE: CPT

## 2021-10-21 PROCEDURE — 85025 COMPLETE CBC W/AUTO DIFF WBC: CPT

## 2021-10-21 PROCEDURE — 80061 LIPID PANEL: CPT

## 2021-10-21 PROCEDURE — 84443 ASSAY THYROID STIM HORMONE: CPT

## 2021-10-21 PROCEDURE — 80053 COMPREHEN METABOLIC PANEL: CPT

## 2021-10-29 ENCOUNTER — IMMUNIZATIONS (OUTPATIENT)
Dept: FAMILY MEDICINE CLINIC | Facility: HOSPITAL | Age: 71
End: 2021-10-29

## 2021-10-29 DIAGNOSIS — Z23 ENCOUNTER FOR IMMUNIZATION: Primary | ICD-10-CM

## 2021-10-29 PROCEDURE — 0001A COVID-19 PFIZER VACC 0.3 ML: CPT

## 2021-10-29 PROCEDURE — 91300 COVID-19 PFIZER VACC 0.3 ML: CPT

## 2021-11-01 ENCOUNTER — OFFICE VISIT (OUTPATIENT)
Dept: INTERNAL MEDICINE CLINIC | Facility: CLINIC | Age: 71
End: 2021-11-01
Payer: COMMERCIAL

## 2021-11-01 VITALS
DIASTOLIC BLOOD PRESSURE: 88 MMHG | WEIGHT: 130 LBS | HEART RATE: 69 BPM | SYSTOLIC BLOOD PRESSURE: 152 MMHG | BODY MASS INDEX: 24.55 KG/M2 | RESPIRATION RATE: 16 BRPM | HEIGHT: 61 IN | TEMPERATURE: 98 F | OXYGEN SATURATION: 99 %

## 2021-11-01 DIAGNOSIS — I10 HYPERTENSION, UNSPECIFIED TYPE: Primary | ICD-10-CM

## 2021-11-01 DIAGNOSIS — E03.9 HYPOTHYROIDISM, UNSPECIFIED TYPE: ICD-10-CM

## 2021-11-01 DIAGNOSIS — E78.5 HYPERLIPIDEMIA, UNSPECIFIED HYPERLIPIDEMIA TYPE: ICD-10-CM

## 2021-11-01 DIAGNOSIS — T14.8XXA PUNCTURE WOUND: ICD-10-CM

## 2021-11-01 DIAGNOSIS — R73.9 HYPERGLYCEMIA: ICD-10-CM

## 2021-11-01 PROCEDURE — 99214 OFFICE O/P EST MOD 30 MIN: CPT | Performed by: NURSE PRACTITIONER

## 2021-11-01 PROCEDURE — 90715 TDAP VACCINE 7 YRS/> IM: CPT | Performed by: NURSE PRACTITIONER

## 2021-11-01 PROCEDURE — 90471 IMMUNIZATION ADMIN: CPT | Performed by: NURSE PRACTITIONER

## 2021-11-01 RX ORDER — PRAVASTATIN SODIUM 80 MG/1
80 TABLET ORAL DAILY
Qty: 90 TABLET | Refills: 3 | Status: SHIPPED | OUTPATIENT
Start: 2021-11-01

## 2021-11-01 RX ORDER — LISINOPRIL 30 MG/1
30 TABLET ORAL DAILY
Qty: 90 TABLET | Refills: 3 | Status: SHIPPED | OUTPATIENT
Start: 2021-11-01

## 2021-12-13 DIAGNOSIS — R05.9 COUGH: ICD-10-CM

## 2021-12-13 RX ORDER — ALBUTEROL SULFATE 90 UG/1
AEROSOL, METERED RESPIRATORY (INHALATION)
Qty: 8.5 G | Refills: 2 | Status: SHIPPED | OUTPATIENT
Start: 2021-12-13

## 2022-01-10 ENCOUNTER — CLINICAL SUPPORT (OUTPATIENT)
Dept: INTERNAL MEDICINE CLINIC | Facility: CLINIC | Age: 72
End: 2022-01-10
Payer: MEDICARE

## 2022-01-10 DIAGNOSIS — M81.0 AGE RELATED OSTEOPOROSIS, UNSPECIFIED PATHOLOGICAL FRACTURE PRESENCE: Primary | ICD-10-CM

## 2022-01-10 PROCEDURE — 96372 THER/PROPH/DIAG INJ SC/IM: CPT

## 2022-04-06 ENCOUNTER — APPOINTMENT (OUTPATIENT)
Dept: LAB | Facility: HOSPITAL | Age: 72
End: 2022-04-06
Payer: MEDICARE

## 2022-04-06 DIAGNOSIS — E78.5 HYPERLIPIDEMIA, UNSPECIFIED HYPERLIPIDEMIA TYPE: ICD-10-CM

## 2022-04-06 DIAGNOSIS — E03.9 HYPOTHYROIDISM, UNSPECIFIED TYPE: ICD-10-CM

## 2022-04-06 DIAGNOSIS — R73.9 HYPERGLYCEMIA: ICD-10-CM

## 2022-04-06 DIAGNOSIS — I10 HYPERTENSION, UNSPECIFIED TYPE: ICD-10-CM

## 2022-04-06 LAB
ALBUMIN SERPL BCP-MCNC: 3.8 G/DL (ref 3.5–5)
ALP SERPL-CCNC: 42 U/L (ref 46–116)
ALT SERPL W P-5'-P-CCNC: 27 U/L (ref 12–78)
ANION GAP SERPL CALCULATED.3IONS-SCNC: 8 MMOL/L (ref 4–13)
AST SERPL W P-5'-P-CCNC: 17 U/L (ref 5–45)
BASOPHILS # BLD AUTO: 0.03 THOUSANDS/ΜL (ref 0–0.1)
BASOPHILS NFR BLD AUTO: 1 % (ref 0–1)
BILIRUB SERPL-MCNC: 0.47 MG/DL (ref 0.2–1)
BUN SERPL-MCNC: 15 MG/DL (ref 5–25)
CALCIUM SERPL-MCNC: 8.7 MG/DL (ref 8.3–10.1)
CHLORIDE SERPL-SCNC: 104 MMOL/L (ref 100–108)
CHOLEST SERPL-MCNC: 244 MG/DL
CO2 SERPL-SCNC: 29 MMOL/L (ref 21–32)
CREAT SERPL-MCNC: 0.59 MG/DL (ref 0.6–1.3)
EOSINOPHIL # BLD AUTO: 0.06 THOUSAND/ΜL (ref 0–0.61)
EOSINOPHIL NFR BLD AUTO: 1 % (ref 0–6)
ERYTHROCYTE [DISTWIDTH] IN BLOOD BY AUTOMATED COUNT: 13.3 % (ref 11.6–15.1)
GFR SERPL CREATININE-BSD FRML MDRD: 92 ML/MIN/1.73SQ M
GLUCOSE P FAST SERPL-MCNC: 84 MG/DL (ref 65–99)
HCT VFR BLD AUTO: 39 % (ref 34.8–46.1)
HDLC SERPL-MCNC: 98 MG/DL
HGB BLD-MCNC: 12.9 G/DL (ref 11.5–15.4)
IMM GRANULOCYTES # BLD AUTO: 0.02 THOUSAND/UL (ref 0–0.2)
IMM GRANULOCYTES NFR BLD AUTO: 1 % (ref 0–2)
LDLC SERPL CALC-MCNC: 131 MG/DL (ref 0–100)
LYMPHOCYTES # BLD AUTO: 1.41 THOUSANDS/ΜL (ref 0.6–4.47)
LYMPHOCYTES NFR BLD AUTO: 32 % (ref 14–44)
MCH RBC QN AUTO: 30.4 PG (ref 26.8–34.3)
MCHC RBC AUTO-ENTMCNC: 33.1 G/DL (ref 31.4–37.4)
MCV RBC AUTO: 92 FL (ref 82–98)
MONOCYTES # BLD AUTO: 0.35 THOUSAND/ΜL (ref 0.17–1.22)
MONOCYTES NFR BLD AUTO: 8 % (ref 4–12)
NEUTROPHILS # BLD AUTO: 2.48 THOUSANDS/ΜL (ref 1.85–7.62)
NEUTS SEG NFR BLD AUTO: 57 % (ref 43–75)
NONHDLC SERPL-MCNC: 146 MG/DL
NRBC BLD AUTO-RTO: 0 /100 WBCS
PLATELET # BLD AUTO: 224 THOUSANDS/UL (ref 149–390)
PMV BLD AUTO: 11.8 FL (ref 8.9–12.7)
POTASSIUM SERPL-SCNC: 3.7 MMOL/L (ref 3.5–5.3)
PROT SERPL-MCNC: 7.2 G/DL (ref 6.4–8.2)
RBC # BLD AUTO: 4.24 MILLION/UL (ref 3.81–5.12)
SODIUM SERPL-SCNC: 141 MMOL/L (ref 136–145)
TRIGL SERPL-MCNC: 73 MG/DL
TSH SERPL DL<=0.05 MIU/L-ACNC: 1.47 UIU/ML (ref 0.45–4.5)
WBC # BLD AUTO: 4.35 THOUSAND/UL (ref 4.31–10.16)

## 2022-04-06 PROCEDURE — 84165 PROTEIN E-PHORESIS SERUM: CPT | Performed by: PATHOLOGY

## 2022-04-06 PROCEDURE — 36415 COLL VENOUS BLD VENIPUNCTURE: CPT | Performed by: INTERNAL MEDICINE

## 2022-04-06 PROCEDURE — 86334 IMMUNOFIX E-PHORESIS SERUM: CPT | Performed by: INTERNAL MEDICINE

## 2022-04-06 PROCEDURE — 84443 ASSAY THYROID STIM HORMONE: CPT

## 2022-04-06 PROCEDURE — 84165 PROTEIN E-PHORESIS SERUM: CPT | Performed by: INTERNAL MEDICINE

## 2022-04-06 PROCEDURE — 80061 LIPID PANEL: CPT

## 2022-04-06 PROCEDURE — 83036 HEMOGLOBIN GLYCOSYLATED A1C: CPT

## 2022-04-06 PROCEDURE — 83521 IG LIGHT CHAINS FREE EACH: CPT | Performed by: INTERNAL MEDICINE

## 2022-04-06 PROCEDURE — 85025 COMPLETE CBC W/AUTO DIFF WBC: CPT | Performed by: INTERNAL MEDICINE

## 2022-04-06 PROCEDURE — 86334 IMMUNOFIX E-PHORESIS SERUM: CPT | Performed by: PATHOLOGY

## 2022-04-06 PROCEDURE — 80053 COMPREHEN METABOLIC PANEL: CPT | Performed by: INTERNAL MEDICINE

## 2022-04-07 LAB
EST. AVERAGE GLUCOSE BLD GHB EST-MCNC: 105 MG/DL
HBA1C MFR BLD: 5.3 %
KAPPA LC FREE SER-MCNC: 12.2 MG/L (ref 3.3–19.4)
KAPPA LC FREE/LAMBDA FREE SER: 1.21 {RATIO} (ref 0.26–1.65)
LAMBDA LC FREE SERPL-MCNC: 10.1 MG/L (ref 5.7–26.3)

## 2022-04-08 LAB
ALBUMIN SERPL ELPH-MCNC: 4.32 G/DL (ref 3.5–5)
ALBUMIN SERPL ELPH-MCNC: 63.5 % (ref 52–65)
ALPHA1 GLOB SERPL ELPH-MCNC: 0.34 G/DL (ref 0.1–0.4)
ALPHA1 GLOB SERPL ELPH-MCNC: 5 % (ref 2.5–5)
ALPHA2 GLOB SERPL ELPH-MCNC: 0.73 G/DL (ref 0.4–1.2)
ALPHA2 GLOB SERPL ELPH-MCNC: 10.7 % (ref 7–13)
BETA GLOB ABNORMAL SERPL ELPH-MCNC: 0.37 G/DL (ref 0.4–0.8)
BETA1 GLOB SERPL ELPH-MCNC: 5.4 % (ref 5–13)
BETA2 GLOB SERPL ELPH-MCNC: 4.1 % (ref 2–8)
BETA2+GAMMA GLOB SERPL ELPH-MCNC: 0.28 G/DL (ref 0.2–0.5)
GAMMA GLOB ABNORMAL SERPL ELPH-MCNC: 0.77 G/DL (ref 0.5–1.6)
GAMMA GLOB SERPL ELPH-MCNC: 11.3 % (ref 12–22)
IGG/ALB SER: 1.74 {RATIO} (ref 1.1–1.8)
INTERPRETATION UR IFE-IMP: NORMAL
M PROTEIN 1 MFR SERPL ELPH: 2.4 %
M PROTEIN 1 SERPL ELPH-MCNC: 0.16 G/DL
PROT PATTERN SERPL ELPH-IMP: ABNORMAL
PROT SERPL-MCNC: 6.8 G/DL (ref 6.4–8.2)

## 2022-04-11 ENCOUNTER — OFFICE VISIT (OUTPATIENT)
Dept: HEMATOLOGY ONCOLOGY | Facility: CLINIC | Age: 72
End: 2022-04-11
Payer: MEDICARE

## 2022-04-11 VITALS
OXYGEN SATURATION: 99 % | HEIGHT: 61 IN | HEART RATE: 70 BPM | BODY MASS INDEX: 24.92 KG/M2 | SYSTOLIC BLOOD PRESSURE: 142 MMHG | DIASTOLIC BLOOD PRESSURE: 90 MMHG | RESPIRATION RATE: 16 BRPM | WEIGHT: 132 LBS | TEMPERATURE: 97.9 F

## 2022-04-11 DIAGNOSIS — D47.2 MGUS (MONOCLONAL GAMMOPATHY OF UNKNOWN SIGNIFICANCE): Primary | ICD-10-CM

## 2022-04-11 PROCEDURE — 99213 OFFICE O/P EST LOW 20 MIN: CPT | Performed by: INTERNAL MEDICINE

## 2022-04-11 NOTE — PROGRESS NOTES
HEMATOLOGY CLINIC NOTE    Primary Care Provider: Love Mahajan  Referring Provider:    MRN: [id number]  : 1950    Assessment / Plan:   1  MGUS (monoclonal gammopathy of unknown significance)  This is a 79-year-old female with a history of MGUS, IgG lambda with initial finding 10/2019  She has been watched since with lab work  Her most recent labs are as follows 2022: WBC 4 35, Hgb 12 9, MCV 92, PLT 224K, diff WNL  CMP normal  SPEP --> M spike 0 16  IgG Lambda  Free lt chain normal  Bone survey no destructive lesions seen 2021  She has no bone pain, constitutional symptoms  Patient lab work is stable  We will repeat labs in 1 year with annual follow-up then  Patient was educated on s/s of concern such as constitutional symptoms, bone pain and she understands if she develops these to call our office      - CBC and differential; Future  - Comprehensive metabolic panel; Future  - Protein electrophoresis, serum; Future  - Immunoglobulin free LT chains blood; Future  - IgG, IgA, IgM; Future      Patient voiced understanding and agreement to the above  The patient knows to call the office with any questions or concerns regarding the above  I have spent 30 minutes with Patient  today in which greater than 50% of this time was spent in counseling/coordination of care regarding Diagnostic results, Risks and benefits of tx options, Intructions for management, Patient and family education, Importance of tx compliance, Risk factor reductions and Impressions  Reason for visit:       Chief Complaint   Patient presents with    Follow-up     MGUS       History of Hematology Illness:     Kurtis Durand is a 70 y o  female who came in for follow up      1  MGUS - IgG lambda, low risk  -initial c/s 10/2019 - M spike 0 14g --> 2020 M spike 0 15g -->3/29/21 M spike 0 14g     Recent Lab review:  3/29/21: WBC 4 80, Hgb 12 4, MCV 92, platelet 804, diff WNL               Cr 0 60, BUN 13, Ca 8 4 K/L ratio 0 97  9/10/21: WBC 5 02, Hgb 12 8, MCV 93, platelet 090, diff WNL              Cr 0 67, BUN 12, Ca 8 6     4/2022: WBC 4 35, Hgb 12 9, MCV 92, PLT 224K, diff WNL  CMP normal  SPEP --> M spike 0 16  IgG Lambda  Free lt chain normal  Bone survey no destructive lesions seen  Interval History:   "10/17/2019 :Mai Stanford is a 71 year female, history of hypertension, borderline diabetes, former smoker, recently diagnosed MGUS came in to establish care with hematology      Reported overall at baseline health status no constitutional symptoms, body weight stable   No bone pain   No bleeding easy bruise      Patient is a former smoker   Has no prior hematology oncology issues in past   No family history of malignancy  "     4/16/21:  Patient came in for follow-up  Offers no complaints  No constitutional symptoms, bone pain  4/11/2022:  Patient came in for follow-up  She offers no specific complaints  She has no constitutional symptoms such as fever, chills, night sweats, lumps, bumps, sudden weight loss  No bone pain  Problem list:       Patient Active Problem List   Diagnosis    Fibrocystic breast disease    Hyperglycemia    Hyperlipidemia    Hypertension    Hypothyroidism    Mammogram abnormal    Osteoporosis    Tubular adenoma    Chronic rhinitis    Screening for malignant neoplasm of colon    Monoclonal gammopathy of undetermined significance IgG lambda    Medicare annual wellness visit, subsequent    Encounter for screening mammogram for malignant neoplasm of breast    Puncture wound       REVIEW OF SYMPTOMS:   Review of Systems   Constitutional: Negative for activity change, appetite change, chills, diaphoresis, fatigue, fever and unexpected weight change  HENT: Negative for mouth sores and nosebleeds  Eyes: Negative for visual disturbance  Respiratory: Negative for apnea, cough, choking, chest tightness, shortness of breath, wheezing and stridor      Cardiovascular: Negative for chest pain, palpitations and leg swelling  Gastrointestinal: Negative for abdominal pain, anal bleeding, blood in stool, constipation, diarrhea, nausea and vomiting  Endocrine: Negative for cold intolerance  Genitourinary: Negative for hematuria, menstrual problem and vaginal bleeding  Musculoskeletal: Negative for arthralgias  Skin: Negative for color change, pallor, rash and wound  Neurological: Negative for dizziness, syncope, light-headedness and headaches  Hematological: Negative for adenopathy  Does not bruise/bleed easily  Psychiatric/Behavioral: Negative for sleep disturbance  PHYSICAL EXAMINATION:     Vital Signs:   /90 (BP Location: Left arm, Patient Position: Sitting, Cuff Size: Adult)   Pulse 70   Temp 97 9 °F (36 6 °C)   Resp 16   Ht 5' 1" (1 549 m)   Wt 59 9 kg (132 lb)   SpO2 99%   BMI 24 94 kg/m²   Body surface area is 1 58 meters squared  Ht Readings from Last 8 Encounters:   04/11/22 5' 1" (1 549 m)   11/01/21 5' 1" (1 549 m)   06/23/21 5' (1 524 m)   06/21/21 5' (1 524 m)   04/21/21 5' (1 524 m)   04/16/21 5' (1 524 m)   09/16/20 5' (1 524 m)   06/17/20 5' (1 524 m)       Wt Readings from Last 8 Encounters:   04/11/22 59 9 kg (132 lb)   11/01/21 59 kg (130 lb)   06/23/21 59 kg (130 lb)   06/21/21 59 kg (130 lb)   04/21/21 59 kg (130 lb)   04/16/21 59 4 kg (131 lb)   09/16/20 58 1 kg (128 lb)   06/17/20 58 5 kg (129 lb)          Physical Exam  Constitutional:       General: She is not in acute distress  Appearance: Normal appearance  She is not ill-appearing, toxic-appearing or diaphoretic  HENT:      Head: Normocephalic and atraumatic  Eyes:      General: No scleral icterus  Extraocular Movements: Extraocular movements intact  Conjunctiva/sclera: Conjunctivae normal       Pupils: Pupils are equal, round, and reactive to light  Cardiovascular:      Rate and Rhythm: Normal rate and regular rhythm  Heart sounds: Normal heart sounds   No murmur heard  Pulmonary:      Effort: Pulmonary effort is normal  No respiratory distress  Breath sounds: Normal breath sounds  No stridor  No wheezing, rhonchi or rales  Abdominal:      Palpations: Abdomen is soft  Tenderness: There is no abdominal tenderness  Musculoskeletal:         General: No tenderness  Normal range of motion  Cervical back: Normal range of motion and neck supple  Right lower leg: No edema  Left lower leg: No edema  Lymphadenopathy:      Cervical: No cervical adenopathy  Skin:     General: Skin is warm and dry  Coloration: Skin is not jaundiced or pale  Findings: No bruising, erythema, lesion or rash  Neurological:      General: No focal deficit present  Mental Status: She is alert and oriented to person, place, and time  Mental status is at baseline  Cranial Nerves: No cranial nerve deficit  Motor: No weakness  Psychiatric:         Mood and Affect: Mood normal          Behavior: Behavior normal          Thought Content: Thought content normal          Judgment: Judgment normal        Reviewed historical information        PAST MEDICAL HISTORY:    Past Medical History:   Diagnosis Date    Pneumonia     RESOLVED: [id number]    Reactive airway disease     RESOLVED: [id number]       PAST SURGICAL HISTORY:    Past Surgical History:   Procedure Laterality Date    NECK SURGERY  10/2009    LESION BIOPSY OF NECK MASS    NEUROPLASTY / TRANSPOSITION MEDIAN NERVE AT CARPAL TUNNEL      TOTAL THYROIDECTOMY      RESOLVED: [id number]         CURRENT MEDICATIONS:     Current Outpatient Medications:     albuterol (PROVENTIL HFA,VENTOLIN HFA) 90 mcg/act inhaler, INHALE 2 PUFFS BY MOUTH EVERY 4 HOURS AS NEEDED FOR WHEEZING OR SHORTNESS OF BREATH OR COUGH, Disp: 8 5 g, Rfl: 2    aspirin 81 MG tablet, Take 1 tablet by mouth daily, Disp: , Rfl:     Cholecalciferol (VITAMIN D3) 2000 units capsule, daily, Disp: 100 capsule, Rfl: 0    Flaxseed, Linseed, (FLAXSEED OIL) 1000 MG CAPS, Take 2 capsules by mouth daily, Disp: , Rfl:     levothyroxine 75 mcg tablet, Take 1 tablet (75 mcg total) by mouth daily in the early morning, Disp: 90 tablet, Rfl: 3    lisinopril (ZESTRIL) 30 mg tablet, Take 1 tablet (30 mg total) by mouth daily, Disp: 90 tablet, Rfl: 3    Peak Flow Meter CORNELIA, by Does not apply route, Disp: , Rfl:     pravastatin (PRAVACHOL) 80 mg tablet, Take 1 tablet (80 mg total) by mouth daily, Disp: 90 tablet, Rfl: 3    vitamin E, tocopherol, 400 units capsule, Take 1 capsule (400 Units total) by mouth daily, Disp: , Rfl:     SOCIAL HISTORY:    Social History     Tobacco Use    Smoking status: Former Smoker     Quit date: 7/3/2010     Years since quittin 7    Smokeless tobacco: Never Used   Substance Use Topics    Alcohol use: No    Drug use: No     Tobacco Use    Smoking status: Former Smoker     Quit date: 7/3/2010     Years since quittin     Smokeless tobacco: Never Used   Substance Use Topics    Alcohol use: No    Drug use: No      Problem Relation Age of Onset    Diabetes Mother     Hypertension Mother     Heart disease Father     Breast cancer Paternal Grandmother 79    No Known Problems Daughter     No Known Problems Daughter        ALLERGIES:  No Known Allergies      LAB:    Lab Results   Component Value Date    WBC 4 35 2022    HGB 12 9 2022    HCT 39 0 2022    MCV 92 2022     2022       Lab Results   Component Value Date     2017    SODIUM 141 2022    K 3 7 2022     2022    CO2 29 2022    AGAP 8 2022    BUN 15 2022    CREATININE 0 59 (L) 2022    GLUF 84 2022    CALCIUM 8 7 2022    AST 17 2022    ALT 27 2022    ALKPHOS 42 (L) 2022    PROT 6 5 2017    TP 6 8 2022    TP 7 2 2022    BILITOT 0 3 2017    TBILI 0 47 2022    EGFR 92 2022       IMAGING:  XR bone survey complete >12 mos  Narrative: BONE SURVEY    INDICATION:   D47 2: Monoclonal gammopathy  COMPARISON:  None    VIEWS:  XR BONE SURVEY COMPLETE >12 MOS   15    FINDINGS:    SKULL (LATERAL VIEW):  Unremarkable  CERVICAL SPINE (LATERAL VIEW):    There are age appropriate degenerative changes  No suspicious bone lesions  HUMERI (AP VIEW):   Unremarkable  THORACIC SPINE (AP LATERAL VIEWS): There are age appropriate degenerative changes  No suspicious bone lesions  RIBS:  Unremarkable  LUMBAR SPINE (AP LATERAL VIEWS): There are age appropriate degenerative changes  No suspicious bone lesions  Grade 1 degenerative anterolisthesis of L5 on S1     PELVIS:   Unremarkable  FEMORA (AP VIEW):    Unremarkable  TIBIA-FIBULA (AP VIEW):   Unremarkable  Impression: No destructive osseous lesions      Workstation performed: [id number]

## 2022-05-02 ENCOUNTER — OFFICE VISIT (OUTPATIENT)
Dept: INTERNAL MEDICINE CLINIC | Facility: CLINIC | Age: 72
End: 2022-05-02
Payer: MEDICARE

## 2022-05-02 VITALS
OXYGEN SATURATION: 97 % | DIASTOLIC BLOOD PRESSURE: 98 MMHG | TEMPERATURE: 98.3 F | BODY MASS INDEX: 24.96 KG/M2 | WEIGHT: 132.2 LBS | HEIGHT: 61 IN | SYSTOLIC BLOOD PRESSURE: 160 MMHG | HEART RATE: 69 BPM

## 2022-05-02 DIAGNOSIS — M81.0 AGE RELATED OSTEOPOROSIS, UNSPECIFIED PATHOLOGICAL FRACTURE PRESENCE: ICD-10-CM

## 2022-05-02 DIAGNOSIS — E78.5 HYPERLIPIDEMIA, UNSPECIFIED HYPERLIPIDEMIA TYPE: ICD-10-CM

## 2022-05-02 DIAGNOSIS — E03.9 HYPOTHYROIDISM, UNSPECIFIED TYPE: ICD-10-CM

## 2022-05-02 DIAGNOSIS — I10 HYPERTENSION, UNSPECIFIED TYPE: Primary | ICD-10-CM

## 2022-05-02 DIAGNOSIS — R73.9 HYPERGLYCEMIA: ICD-10-CM

## 2022-05-02 DIAGNOSIS — Z12.31 ENCOUNTER FOR SCREENING MAMMOGRAM FOR MALIGNANT NEOPLASM OF BREAST: ICD-10-CM

## 2022-05-02 DIAGNOSIS — D47.2 MONOCLONAL GAMMOPATHY OF UNDETERMINED SIGNIFICANCE: ICD-10-CM

## 2022-05-02 PROCEDURE — 99214 OFFICE O/P EST MOD 30 MIN: CPT | Performed by: NURSE PRACTITIONER

## 2022-05-02 RX ORDER — PHENOL 1.4 %
600 AEROSOL, SPRAY (ML) MUCOUS MEMBRANE DAILY
COMMUNITY

## 2022-05-02 NOTE — PROGRESS NOTES
Depression Screening and Follow-up Plan: Patient was screened for depression during today's encounter  They screened negative with a PHQ-2 score of 0  Assessment/Plan:    1  Hypertension- BP tends to run high here, but blood pressures at home is within normal range as usual  Continue Lisinopril  2  Hyperlipidemia- LDL slightly elevated  Please remember to take your Pravastatin daily and will recheck lipid panel in six months  3  Hypothyroidism- At goal  Continue present dose of Levothyroxine and repeat TSH in six months  4  Monoclonal Gammopathy of Undetermined Significance- Followed by hematology  5  Osteoporosis- Stable  Continue prolia  Mammogram ordered  Follow up in six months, labs prior  Diagnoses and all orders for this visit:    Hypertension, unspecified type  -     CBC and differential; Future  -     Comprehensive metabolic panel; Future    Hypothyroidism, unspecified type  -     TSH, 3rd generation with Free T4 reflex; Future    Age related osteoporosis, unspecified pathological fracture presence    Hyperglycemia  -     HEMOGLOBIN A1C W/ EAG ESTIMATION; Future    Hyperlipidemia, unspecified hyperlipidemia type  -     Lipid panel; Future    Monoclonal gammopathy of undetermined significance IgG lambda    Encounter for screening mammogram for malignant neoplasm of breast  -     Mammo screening bilateral w 3d & cad; Future    Other orders  -     calcium carbonate (OS-LAUREN) 600 MG tablet; Take 600 mg by mouth daily        The patient was counseled regarding instructions for management, risk factor reductions, patient and family education,impressions, risks and benefits of treatment options, side effects of medications, importance of compliance with treatment  The treatment plan was reviewed with the patient/guardian and patient/guardian understands and agrees with the treatment plan          Current Outpatient Medications:     albuterol (PROVENTIL HFA,VENTOLIN HFA) 90 mcg/act inhaler, INHALE 2 PUFFS BY MOUTH EVERY 4 HOURS AS NEEDED FOR WHEEZING OR SHORTNESS OF BREATH OR COUGH, Disp: 8 5 g, Rfl: 2    aspirin 81 MG tablet, Take 1 tablet by mouth daily, Disp: , Rfl:     calcium carbonate (OS-LAUREN) 600 MG tablet, Take 600 mg by mouth daily, Disp: , Rfl:     Cholecalciferol (VITAMIN D3) 2000 units capsule, daily, Disp: 100 capsule, Rfl: 0    Flaxseed, Linseed, (FLAXSEED OIL) 1000 MG CAPS, Take 2 capsules by mouth daily, Disp: , Rfl:     levothyroxine 75 mcg tablet, Take 1 tablet (75 mcg total) by mouth daily in the early morning, Disp: 90 tablet, Rfl: 3    lisinopril (ZESTRIL) 30 mg tablet, Take 1 tablet (30 mg total) by mouth daily, Disp: 90 tablet, Rfl: 3    Peak Flow Meter CORNELIA, by Does not apply route, Disp: , Rfl:     pravastatin (PRAVACHOL) 80 mg tablet, Take 1 tablet (80 mg total) by mouth daily, Disp: 90 tablet, Rfl: 3    vitamin E, tocopherol, 400 units capsule, Take 1 capsule (400 Units total) by mouth daily, Disp: , Rfl:     Subjective:      Patient ID: Lisette Han is a 70 y o  female  Here today for follow up  She did forget to take her Pravastatin for a period of time  Bps at home are running 110s-120s/70s-80s  No complaints today  The following portions of the patient's history were reviewed and updated as appropriate:   She has a past medical history of Pneumonia and Reactive airway disease  ,  does not have any pertinent problems on file  ,   has a past surgical history that includes Neck surgery (10/2009); Neuroplasty / transposition median nerve at carpal tunnel; and Total thyroidectomy  ,  family history includes Breast cancer (age of onset: 79) in her paternal grandmother; Diabetes in her mother; Heart disease in her father; Hypertension in her mother; No Known Problems in her daughter and daughter  ,   reports that she quit smoking about 11 years ago  She has never used smokeless tobacco  She reports that she does not drink alcohol and does not use drugs  ,  has No Known Allergies       Review of Systems   Constitutional: Negative  Respiratory: Negative  Cardiovascular: Negative  Musculoskeletal: Negative  Psychiatric/Behavioral: Negative  Objective:  /98 (BP Location: Left arm, Patient Position: Sitting, Cuff Size: Adult)   Pulse 69   Temp 98 3 °F (36 8 °C) (Tympanic)   Ht 5' 1" (1 549 m)   Wt 60 kg (132 lb 3 2 oz)   SpO2 97%   BMI 24 98 kg/m²     Lab Review  Appointment on 04/06/2022   Component Date Value    Hemoglobin A1C 04/06/2022 5 3     EAG 04/06/2022 105     Cholesterol 04/06/2022 244*    Triglycerides 04/06/2022 73     HDL, Direct 04/06/2022 98     LDL Calculated 04/06/2022 131*    Non-HDL-Chol (CHOL-HDL) 04/06/2022 146     TSH 3RD GENERATON 04/06/2022 1 468       Imaging: No results found  Physical Exam  Constitutional:       Appearance: She is well-developed  Cardiovascular:      Rate and Rhythm: Normal rate and regular rhythm  Heart sounds: Normal heart sounds  Pulmonary:      Effort: Pulmonary effort is normal       Breath sounds: Normal breath sounds  Musculoskeletal:         General: Normal range of motion  Neurological:      Mental Status: She is alert and oriented to person, place, and time  Deep Tendon Reflexes: Reflexes are normal and symmetric  Psychiatric:         Behavior: Behavior normal          Thought Content:  Thought content normal          Judgment: Judgment normal

## 2022-05-02 NOTE — PATIENT INSTRUCTIONS
1  Hypertension- BP tends to run high here, but blood pressures at home is within normal range as usual  Continue Lisinopril  2  Hyperlipidemia- LDL slightly elevated  Please remember to take your Pravastatin daily and will recheck lipid panel in six months  3  Hypothyroidism- At goal  Continue present dose of Levothyroxine and repeat TSH in six months  4  Monoclonal Gammopathy of Undetermined Significance- Followed by hematology  5  Osteoporosis- Stable  Continue prolia  Mammogram ordered  Follow up in six months, labs prior

## 2022-07-11 ENCOUNTER — CLINICAL SUPPORT (OUTPATIENT)
Dept: INTERNAL MEDICINE CLINIC | Facility: CLINIC | Age: 72
End: 2022-07-11
Payer: MEDICARE

## 2022-07-11 DIAGNOSIS — Z12.11 ENCOUNTER FOR SCREENING FOR MALIGNANT NEOPLASM OF COLON: Primary | ICD-10-CM

## 2022-07-11 DIAGNOSIS — M81.0 AGE RELATED OSTEOPOROSIS, UNSPECIFIED PATHOLOGICAL FRACTURE PRESENCE: Primary | ICD-10-CM

## 2022-07-11 PROCEDURE — 96372 THER/PROPH/DIAG INJ SC/IM: CPT

## 2022-07-21 ENCOUNTER — HOSPITAL ENCOUNTER (OUTPATIENT)
Dept: MAMMOGRAPHY | Facility: CLINIC | Age: 72
Discharge: HOME | End: 2022-07-21
Payer: MEDICARE

## 2022-07-21 DIAGNOSIS — Z12.31 ENCOUNTER FOR SCREENING MAMMOGRAM FOR MALIGNANT NEOPLASM OF BREAST: ICD-10-CM

## 2022-07-21 PROCEDURE — 77067 SCR MAMMO BI INCL CAD: CPT

## 2022-07-21 PROCEDURE — 77063 BREAST TOMOSYNTHESIS BI: CPT

## 2022-09-17 DIAGNOSIS — E03.9 HYPOTHYROIDISM, UNSPECIFIED TYPE: ICD-10-CM

## 2022-09-19 RX ORDER — LEVOTHYROXINE SODIUM 0.07 MG/1
TABLET ORAL
Qty: 90 TABLET | Refills: 3 | Status: SHIPPED | OUTPATIENT
Start: 2022-09-19

## 2022-10-25 ENCOUNTER — RA CDI HCC (OUTPATIENT)
Dept: OTHER | Facility: HOSPITAL | Age: 72
End: 2022-10-25

## 2022-10-25 NOTE — PROGRESS NOTES
[address]  coding opportunities       Chart reviewed, no opportunity found: CHART REVIEWED, NO OPPORTUNITY FOUND        Patients Insurance     Medicare Insurance: Medicare

## 2022-10-31 ENCOUNTER — APPOINTMENT (OUTPATIENT)
Dept: LAB | Facility: HOSPITAL | Age: 72
End: 2022-10-31

## 2022-10-31 DIAGNOSIS — I10 HYPERTENSION, UNSPECIFIED TYPE: ICD-10-CM

## 2022-10-31 DIAGNOSIS — E78.5 HYPERLIPIDEMIA, UNSPECIFIED HYPERLIPIDEMIA TYPE: ICD-10-CM

## 2022-10-31 DIAGNOSIS — E03.9 HYPOTHYROIDISM, UNSPECIFIED TYPE: ICD-10-CM

## 2022-10-31 DIAGNOSIS — R73.9 HYPERGLYCEMIA: ICD-10-CM

## 2022-10-31 LAB
ALBUMIN SERPL BCP-MCNC: 3.6 G/DL (ref 3.5–5)
ALP SERPL-CCNC: 41 U/L (ref 46–116)
ALT SERPL W P-5'-P-CCNC: 18 U/L (ref 12–78)
ANION GAP SERPL CALCULATED.3IONS-SCNC: 2 MMOL/L (ref 4–13)
AST SERPL W P-5'-P-CCNC: 11 U/L (ref 5–45)
BASOPHILS # BLD AUTO: 0.03 THOUSANDS/ÂΜL (ref 0–0.1)
BASOPHILS NFR BLD AUTO: 1 % (ref 0–1)
BILIRUB SERPL-MCNC: 0.49 MG/DL (ref 0.2–1)
BUN SERPL-MCNC: 11 MG/DL (ref 5–25)
CALCIUM SERPL-MCNC: 9.2 MG/DL (ref 8.3–10.1)
CHLORIDE SERPL-SCNC: 108 MMOL/L (ref 96–108)
CHOLEST SERPL-MCNC: 221 MG/DL
CO2 SERPL-SCNC: 29 MMOL/L (ref 21–32)
CREAT SERPL-MCNC: 0.65 MG/DL (ref 0.6–1.3)
EOSINOPHIL # BLD AUTO: 0.07 THOUSAND/ÂΜL (ref 0–0.61)
EOSINOPHIL NFR BLD AUTO: 2 % (ref 0–6)
ERYTHROCYTE [DISTWIDTH] IN BLOOD BY AUTOMATED COUNT: 13.3 % (ref 11.6–15.1)
EST. AVERAGE GLUCOSE BLD GHB EST-MCNC: 114 MG/DL
GFR SERPL CREATININE-BSD FRML MDRD: 88 ML/MIN/1.73SQ M
GLUCOSE P FAST SERPL-MCNC: 102 MG/DL (ref 65–99)
HBA1C MFR BLD: 5.6 %
HCT VFR BLD AUTO: 39.6 % (ref 34.8–46.1)
HDLC SERPL-MCNC: 96 MG/DL
HGB BLD-MCNC: 13.1 G/DL (ref 11.5–15.4)
IMM GRANULOCYTES # BLD AUTO: 0.01 THOUSAND/UL (ref 0–0.2)
IMM GRANULOCYTES NFR BLD AUTO: 0 % (ref 0–2)
LDLC SERPL CALC-MCNC: 109 MG/DL (ref 0–100)
LYMPHOCYTES # BLD AUTO: 1.45 THOUSANDS/ÂΜL (ref 0.6–4.47)
LYMPHOCYTES NFR BLD AUTO: 32 % (ref 14–44)
MCH RBC QN AUTO: 30.1 PG (ref 26.8–34.3)
MCHC RBC AUTO-ENTMCNC: 33.1 G/DL (ref 31.4–37.4)
MCV RBC AUTO: 91 FL (ref 82–98)
MONOCYTES # BLD AUTO: 0.41 THOUSAND/ÂΜL (ref 0.17–1.22)
MONOCYTES NFR BLD AUTO: 9 % (ref 4–12)
NEUTROPHILS # BLD AUTO: 2.56 THOUSANDS/ÂΜL (ref 1.85–7.62)
NEUTS SEG NFR BLD AUTO: 56 % (ref 43–75)
NONHDLC SERPL-MCNC: 125 MG/DL
NRBC BLD AUTO-RTO: 0 /100 WBCS
PLATELET # BLD AUTO: 228 THOUSANDS/UL (ref 149–390)
PMV BLD AUTO: 11.3 FL (ref 8.9–12.7)
POTASSIUM SERPL-SCNC: 4 MMOL/L (ref 3.5–5.3)
PROT SERPL-MCNC: 7.2 G/DL (ref 6.4–8.4)
RBC # BLD AUTO: 4.35 MILLION/UL (ref 3.81–5.12)
SODIUM SERPL-SCNC: 139 MMOL/L (ref 135–147)
TRIGL SERPL-MCNC: 78 MG/DL
TSH SERPL DL<=0.05 MIU/L-ACNC: 2.3 UIU/ML (ref 0.45–4.5)
WBC # BLD AUTO: 4.53 THOUSAND/UL (ref 4.31–10.16)

## 2022-11-02 ENCOUNTER — OFFICE VISIT (OUTPATIENT)
Dept: INTERNAL MEDICINE CLINIC | Facility: CLINIC | Age: 72
End: 2022-11-02

## 2022-11-02 VITALS
HEART RATE: 93 BPM | TEMPERATURE: 98.3 F | OXYGEN SATURATION: 97 % | DIASTOLIC BLOOD PRESSURE: 80 MMHG | RESPIRATION RATE: 14 BRPM | HEIGHT: 61 IN | SYSTOLIC BLOOD PRESSURE: 142 MMHG | BODY MASS INDEX: 23.98 KG/M2 | WEIGHT: 127 LBS

## 2022-11-02 DIAGNOSIS — E78.5 HYPERLIPIDEMIA, UNSPECIFIED HYPERLIPIDEMIA TYPE: ICD-10-CM

## 2022-11-02 DIAGNOSIS — D47.2 MONOCLONAL GAMMOPATHY OF UNDETERMINED SIGNIFICANCE: ICD-10-CM

## 2022-11-02 DIAGNOSIS — I10 HYPERTENSION, UNSPECIFIED TYPE: ICD-10-CM

## 2022-11-02 DIAGNOSIS — M81.0 AGE RELATED OSTEOPOROSIS, UNSPECIFIED PATHOLOGICAL FRACTURE PRESENCE: ICD-10-CM

## 2022-11-02 DIAGNOSIS — E03.9 HYPOTHYROIDISM, UNSPECIFIED TYPE: Primary | ICD-10-CM

## 2022-11-02 NOTE — PROGRESS NOTES
Assessment and Plan:     Problem List Items Addressed This Visit    None         Depression Screening and Follow-up Plan: Patient was screened for depression during today's encounter  They screened negative with a PHQ-2 score of 0  Preventive health issues were discussed with patient, and age appropriate screening tests were ordered as noted in patient's After Visit Summary  Personalized health advice and appropriate referrals for health education or preventive services given if needed, as noted in patient's After Visit Summary  History of Present Illness:     Patient presents for a Medicare Wellness Visit    Medicare wellness today  Patient Care Team:  Anaya coles PCP - General (Internal Medicine)     Review of Systems:     Review of Systems   Constitutional: Negative  Respiratory: Negative  Cardiovascular: Negative  Musculoskeletal: Negative  Psychiatric/Behavioral: Negative           Problem List:     Patient Active Problem List   Diagnosis   • Fibrocystic breast disease   • Hyperglycemia   • Hyperlipidemia   • Hypertension   • Hypothyroidism   • Mammogram abnormal   • Osteoporosis   • Tubular adenoma   • Chronic rhinitis   • Screening for malignant neoplasm of colon   • Monoclonal gammopathy of undetermined significance IgG lambda   • Medicare annual wellness visit, subsequent   • Encounter for screening mammogram for malignant neoplasm of breast   • Puncture wound      Past Medical and Surgical History:     Past Medical History:   Diagnosis Date   • Pneumonia     RESOLVED: [id number]   • Reactive airway disease     RESOLVED: [id number]     Past Surgical History:   Procedure Laterality Date   • NECK SURGERY  10/2009    LESION BIOPSY OF NECK MASS   • NEUROPLASTY / TRANSPOSITION MEDIAN NERVE AT CARPAL TUNNEL     • TOTAL THYROIDECTOMY      RESOLVED: [id number]      Family History:     Family History   Problem Relation Age of Onset   • Diabetes Mother    • Hypertension Mother    • Heart disease Father    • No Known Problems Daughter    • No Known Problems Daughter    • Breast cancer Paternal Grandmother 79   • No Known Problems Maternal Aunt    • No Known Problems Maternal Aunt    • No Known Problems Paternal Aunt       Social History:     Social History     Socioeconomic History   • Marital status: /Civil Union     Spouse name: None   • Number of children: None   • Years of education: None   • Highest education level: None   Occupational History   • None   Tobacco Use   • Smoking status: Former Smoker     Quit date: 7/3/2010     Years since quittin 3   • Smokeless tobacco: Never Used   Substance and Sexual Activity   • Alcohol use: No   • Drug use: No   • Sexual activity: Yes   Other Topics Concern   • None   Social History Narrative    Caffeine use      Social Determinants of Health     Financial Resource Strain: Low Risk    • Difficulty of Paying Living Expenses: Not hard at all   Food Insecurity: Not on file   Transportation Needs: No Transportation Needs   • Lack of Transportation (Medical): No   • Lack of Transportation (Non-Medical):  No   Physical Activity: Not on file   Stress: Not on file   Social Connections: Not on file   Intimate Partner Violence: Not on file   Housing Stability: Not on file      Medications and Allergies:     Current Outpatient Medications   Medication Sig Dispense Refill   • albuterol (PROVENTIL HFA,VENTOLIN HFA) 90 mcg/act inhaler INHALE 2 PUFFS BY MOUTH EVERY 4 HOURS AS NEEDED FOR WHEEZING OR SHORTNESS OF BREATH OR COUGH 8 5 g 2   • aspirin 81 MG tablet Take 1 tablet by mouth daily     • calcium carbonate (OS-LAUREN) 600 MG tablet Take 600 mg by mouth daily     • Cholecalciferol (VITAMIN D3) 2000 units capsule daily 100 capsule 0   • Flaxseed, Linseed, (FLAXSEED OIL) 1000 MG CAPS Take 2 capsules by mouth daily     • levothyroxine 75 mcg tablet TAKE 1 TABLET(75 MCG) BY MOUTH DAILY EARLY MORNING 90 tablet 3   • lisinopril (ZESTRIL) 30 mg tablet Take 1 tablet (30 mg total) by mouth daily 90 tablet 3   • Peak Flow Meter CORNELIA by Does not apply route     • pravastatin (PRAVACHOL) 80 mg tablet Take 1 tablet (80 mg total) by mouth daily 90 tablet 3   • vitamin E, tocopherol, 400 units capsule Take 1 capsule (400 Units total) by mouth daily       No current facility-administered medications for this visit  No Known Allergies   Immunizations:     Immunization History   Administered Date(s) Administered   • COVID-19 PFIZER VACCINE 0 3 ML IM 03/18/2021, 04/08/2021, 10/29/2021   • DT (pediatric) 01/01/2010   • INFLUENZA 10/10/2012, 10/10/2013, 10/01/2014, 09/25/2015, 09/29/2016, 09/09/2017, 09/02/2018, 09/29/2021   • Influenza, high dose seasonal 0 7 mL 08/26/2020   • Influenza, seasonal, injectable 09/14/2011, 10/01/2012, 09/25/2015   • Pneumococcal Conjugate 13-Valent 09/25/2015   • Pneumococcal Polysaccharide PPV23 07/03/2014, 09/16/2020   • Tdap 11/01/2021   • Varicella 01/01/2012   • Zoster Vaccine Recombinant 07/05/2018, 09/06/2018   • influenza, trivalent, adjuvanted 09/19/2019      Health Maintenance:         Topic Date Due   • Colorectal Cancer Screening  08/27/2019   • DXA SCAN  06/21/2023   • Breast Cancer Screening: Mammogram  07/21/2023   • Hepatitis C Screening  Completed         Topic Date Due   • COVID-19 Vaccine (4 - Booster for Swenson Peter series) 02/28/2022      Medicare Screening Tests and Risk Assessments:     Meli Rosas is here for her Subsequent Wellness visit  Health Risk Assessment:   Patient feels that their physical health rating is same  Patient is dissatisfied with their life  Eyesight was rated as same  Hearing was rated as same  Patient feels that their emotional and mental health rating is same  Patients states they are never, rarely angry  Patient states they are never, rarely unusually tired/fatigued  Pain experienced in the last 7 days has been none  Patient states that she has experienced no weight loss or gain in last 6 months  Depression Screening:   PHQ-2 Score: 0      Fall Risk Screening: In the past year, patient has experienced: no history of falling in past year      Urinary Incontinence Screening:   Patient has not leaked urine accidently in the last six months  Home Safety:  Patient does not have trouble with stairs inside or outside of their home  Patient has working smoke alarms and has working carbon monoxide detector  Home safety hazards include: none  Nutrition:   Current diet is Regular  Medications:   Patient is currently taking over-the-counter supplements  OTC medications include: see medication list  Patient is able to manage medications  Activities of Daily Living (ADLs)/Instrumental Activities of Daily Living (IADLs):   Walk and transfer into and out of bed and chair?: Yes  Dress and groom yourself?: Yes    Bathe or shower yourself?: Yes    Feed yourself? Yes  Do your laundry/housekeeping?: Yes  Manage your money, pay your bills and track your expenses?: Yes  Make your own meals?: Yes    Do your own shopping?: Yes    Previous Hospitalizations:   Any hospitalizations or ED visits within the last 12 months?: No      Advance Care Planning:   Living will: Yes    Durable POA for healthcare:  Yes    Advanced directive: Yes      PREVENTIVE SCREENINGS      Cardiovascular Screening:    General: Screening Not Indicated and History Lipid Disorder      Diabetes Screening:     General: Screening Current      Colorectal Cancer Screening:     General: Screening Current      Breast Cancer Screening:     General: Screening Current      Cervical Cancer Screening:    General: Screening Not Indicated      Osteoporosis Screening:    General: Screening Not Indicated and History Osteoporosis      Abdominal Aortic Aneurysm (AAA) Screening:        General: Screening Not Indicated      Lung Cancer Screening:     General: Screening Not Indicated      Hepatitis C Screening:    General: Screening Current    Screening, Brief Intervention, and Referral to Treatment (SBIRT)    Screening      Single Item Drug Screening:  How often have you used an illegal drug (including marijuana) or a prescription medication for non-medical reasons in the past year? never    Single Item Drug Screen Score: 0  Interpretation: Negative screen for possible drug use disorder    No exam data present     Physical Exam:     /82 (BP Location: Left arm, Patient Position: Sitting, Cuff Size: Standard)   Pulse 93   Temp 98 3 °F (36 8 °C) (Tympanic)   Resp 14   Ht 5' 1" (1 549 m)   Wt 57 6 kg (127 lb)   SpO2 97%   BMI 24 00 kg/m²     Physical Exam  Vitals and nursing note reviewed  Constitutional:       General: She is not in acute distress  Appearance: She is well-developed  HENT:      Head: Normocephalic and atraumatic  Eyes:      Conjunctiva/sclera: Conjunctivae normal    Cardiovascular:      Rate and Rhythm: Normal rate and regular rhythm  Heart sounds: No murmur heard  Pulmonary:      Effort: Pulmonary effort is normal  No respiratory distress  Breath sounds: Normal breath sounds  Abdominal:      Palpations: Abdomen is soft  Tenderness: There is no abdominal tenderness  Musculoskeletal:      Cervical back: Neck supple  Skin:     General: Skin is warm and dry  Neurological:      Mental Status: She is alert            Katey Goodman, [address]

## 2022-11-02 NOTE — PATIENT INSTRUCTIONS
Medicare Preventive Visit Patient Instructions  Thank you for completing your Welcome to Medicare Visit or Medicare Annual Wellness Visit today  Your next wellness visit will be due in one year (11/3/2023)  The screening/preventive services that you may require over the next 5-10 years are detailed below  Some tests may not apply to you based off risk factors and/or age  Screening tests ordered at today's visit but not completed yet may show as past due  Also, please note that scanned in results may not display below  Preventive Screenings:  Service Recommendations Previous Testing/Comments   Colorectal Cancer Screening  * Colonoscopy    * Fecal Occult Blood Test (FOBT)/Fecal Immunochemical Test (FIT)  * Fecal DNA/Cologuard Test  * Flexible Sigmoidoscopy Age: 39-70 years old   Colonoscopy: every 10 years (may be performed more frequently if at higher risk)  OR  FOBT/FIT: every 1 year  OR  Cologuard: every 3 years  OR  Sigmoidoscopy: every 5 years  Screening may be recommended earlier than age 39 if at higher risk for colorectal cancer  Also, an individualized decision between you and your healthcare provider will decide whether screening between the ages of 74-80 would be appropriate  Colonoscopy: 06/04/2013  FOBT/FIT: Not on file  Cologuard: Not on file  Sigmoidoscopy: Not on file    Screening Current     Breast Cancer Screening Age: 36 years old  Frequency: every 1-2 years  Not required if history of left and right mastectomy Mammogram: 07/21/2022    Screening Current   Cervical Cancer Screening Between the ages of 21-29, pap smear recommended once every 3 years  Between the ages of 33-67, can perform pap smear with HPV co-testing every 5 years     Recommendations may differ for women with a history of total hysterectomy, cervical cancer, or abnormal pap smears in past  Pap Smear: Not on file    Screening Not Indicated   Hepatitis C Screening Once for adults born between 1945 and 1965  More frequently in patients at high risk for Hepatitis C Hep C Antibody: 06/28/2018    Screening Current   Diabetes Screening 1-2 times per year if you're at risk for diabetes or have pre-diabetes Fasting glucose: 102 mg/dL (10/31/2022)  A1C: 5 6 % (10/31/2022)  Screening Current   Cholesterol Screening Once every 5 years if you don't have a lipid disorder  May order more often based on risk factors  Lipid panel: 10/31/2022    Screening Not Indicated  History Lipid Disorder     Other Preventive Screenings Covered by Medicare:  Abdominal Aortic Aneurysm (AAA) Screening: covered once if your at risk  You're considered to be at risk if you have a family history of AAA  Lung Cancer Screening: covers low dose CT scan once per year if you meet all of the following conditions: (1) Age 50-69; (2) No signs or symptoms of lung cancer; (3) Current smoker or have quit smoking within the last 15 years; (4) You have a tobacco smoking history of at least 20 pack years (packs per day multiplied by number of years you smoked); (5) You get a written order from a healthcare provider  Glaucoma Screening: covered annually if you're considered high risk: (1) You have diabetes OR (2) Family history of glaucoma OR (3)  aged 48 and older OR (3)  American aged 72 and older  Osteoporosis Screening: covered every 2 years if you meet one of the following conditions: (1) You're estrogen deficient and at risk for osteoporosis based off medical history and other findings; (2) Have a vertebral abnormality; (3) On glucocorticoid therapy for more than 3 months; (4) Have primary hyperparathyroidism; (5) On osteoporosis medications and need to assess response to drug therapy  Last bone density test (DXA Scan): 06/21/2021  HIV Screening: covered annually if you're between the age of 12-76  Also covered annually if you are younger than 13 and older than 72 with risk factors for HIV infection   For pregnant patients, it is covered up to 3 times per pregnancy  Immunizations:  Immunization Recommendations   Influenza Vaccine Annual influenza vaccination during flu season is recommended for all persons aged >= 6 months who do not have contraindications   Pneumococcal Vaccine   * Pneumococcal conjugate vaccine = PCV13 (Prevnar 13), PCV15 (Vaxneuvance), PCV20 (Prevnar 20)  * Pneumococcal polysaccharide vaccine = PPSV23 (Pneumovax) Adults 25-60 years old: 1-3 doses may be recommended based on certain risk factors  Adults 72 years old: 1-2 doses may be recommended based off what pneumonia vaccine you previously received   Hepatitis B Vaccine 3 dose series if at intermediate or high risk (ex: diabetes, end stage renal disease, liver disease)   Tetanus (Td) Vaccine - COST NOT COVERED BY MEDICARE PART B Following completion of primary series, a booster dose should be given every 10 years to maintain immunity against tetanus  Td may also be given as tetanus wound prophylaxis  Tdap Vaccine - COST NOT COVERED BY MEDICARE PART B Recommended at least once for all adults  For pregnant patients, recommended with each pregnancy  Shingles Vaccine (Shingrix) - COST NOT COVERED BY MEDICARE PART B  2 shot series recommended in those aged 48 and above     Health Maintenance Due:      Topic Date Due    Colorectal Cancer Screening  08/27/2019    DXA SCAN  06/21/2023    Breast Cancer Screening: Mammogram  07/21/2023    Hepatitis C Screening  Completed     Immunizations Due:      Topic Date Due    COVID-19 Vaccine (4 - Booster for Swenson Peter series) 02/28/2022     Advance Directives   What are advance directives? Advance directives are legal documents that state your wishes and plans for medical care  These plans are made ahead of time in case you lose your ability to make decisions for yourself  Advance directives can apply to any medical decision, such as the treatments you want, and if you want to donate organs  What are the types of advance directives?   There are many types of advance directives, and each state has rules about how to use them  You may choose a combination of any of the following:  Living will: This is a written record of the treatment you want  You can also choose which treatments you do not want, which to limit, and which to stop at a certain time  This includes surgery, medicine, IV fluid, and tube feedings  Durable power of  for healthcare [facility]): This is a written record that states who you want to make healthcare choices for you when you are unable to make them for yourself  This person, called a proxy, is usually a family member or a friend  You may choose more than 1 proxy  Do not resuscitate (DNR) order:  A DNR order is used in case your heart stops beating or you stop breathing  It is a request not to have certain forms of treatment, such as CPR  A DNR order may be included in other types of advance directives  Medical directive: This covers the care that you want if you are in a coma, near death, or unable to make decisions for yourself  You can list the treatments you want for each condition  Treatment may include pain medicine, surgery, blood transfusions, dialysis, IV or tube feedings, and a ventilator (breathing machine)  Values history: This document has questions about your views, beliefs, and how you feel and think about life  This information can help others choose the care that you would choose  Why are advance directives important? An advance directive helps you control your care  Although spoken wishes may be used, it is better to have your wishes written down  Spoken wishes can be misunderstood, or not followed  Treatments may be given even if you do not want them  An advance directive may make it easier for your family to make difficult choices about your care  © Copyright [employer] 2018 Information is for End User's use only and may not be sold, redistributed or otherwise used for commercial purposes   All illustrations and images included in CareNotes® are the copyrighted property of Harpreet PRUITT  or [facility] wellness visit completed   Hypertension- Near goal  Continue present medication  Hyperlipidemia- LDL improved and near goal, good job  Continue pravastatin daily and eating healthy  Hypothyroidism- At goal, continue levothyroxine at current dose  Osteoporosis- Stable on prolia  Continue weight bearing exercises  Monoclonal Gammopathy of Undetermined Significance- Followed by hematology

## 2022-11-02 NOTE — PROGRESS NOTES
Assessment/Plan:    1  Medicare wellness visit completed   2  Hypertension- Near goal  Continue present medication  3  Hyperlipidemia- LDL improved and near goal, good job  Continue pravastatin daily and eating healthy  4  Hypothyroidism- At goal, continue levothyroxine at current dose  5  Osteoporosis- Stable on prolia  Continue weight bearing exercises  Monoclonal Gammopathy of Undetermined Significance- Followed by hematology  Diagnoses and all orders for this visit:    Hypothyroidism, unspecified type    Hypertension, unspecified type    Hyperlipidemia, unspecified hyperlipidemia type    Age related osteoporosis, unspecified pathological fracture presence  -     denosumab (PROLIA) 60 mg/mL; Inject 1 mL (60 mg total) under the skin once for 1 dose    Monoclonal gammopathy of undetermined significance IgG lambda        The patient was counseled regarding instructions for management, risk factor reductions, patient and family education,impressions, risks and benefits of treatment options, side effects of medications, importance of compliance with treatment  The treatment plan was reviewed with the patient/guardian and patient/guardian understands and agrees with the treatment plan              Current Outpatient Medications:   •  albuterol (PROVENTIL HFA,VENTOLIN HFA) 90 mcg/act inhaler, INHALE 2 PUFFS BY MOUTH EVERY 4 HOURS AS NEEDED FOR WHEEZING OR SHORTNESS OF BREATH OR COUGH, Disp: 8 5 g, Rfl: 2  •  aspirin 81 MG tablet, Take 1 tablet by mouth daily, Disp: , Rfl:   •  calcium carbonate (OS-LAUREN) 600 MG tablet, Take 600 mg by mouth daily, Disp: , Rfl:   •  Cholecalciferol (VITAMIN D3) 2000 units capsule, daily, Disp: 100 capsule, Rfl: 0  •  denosumab (PROLIA) 60 mg/mL, Inject 1 mL (60 mg total) under the skin once for 1 dose, Disp: 1 mL, Rfl: 0  •  Flaxseed, Linseed, (FLAXSEED OIL) 1000 MG CAPS, Take 2 capsules by mouth daily, Disp: , Rfl:   •  levothyroxine 75 mcg tablet, TAKE 1 TABLET(75 MCG) BY MOUTH DAILY EARLY MORNING, Disp: 90 tablet, Rfl: 3  •  lisinopril (ZESTRIL) 30 mg tablet, Take 1 tablet (30 mg total) by mouth daily, Disp: 90 tablet, Rfl: 3  •  Peak Flow Meter CORNELIA, by Does not apply route, Disp: , Rfl:   •  pravastatin (PRAVACHOL) 80 mg tablet, Take 1 tablet (80 mg total) by mouth daily, Disp: 90 tablet, Rfl: 3  •  vitamin E, tocopherol, 400 units capsule, Take 1 capsule (400 Units total) by mouth daily, Disp: , Rfl:     Subjective:      Patient ID: Millicent Coon is a 67 y o  female  Patient is here for medicare wellness visit  She is feeling good overall  She recently finished penicillin antibiotics after having tooth removal surgery  Patient has been staying active by biking every day  The following portions of the patient's history were reviewed and updated as appropriate:   She has a past medical history of Pneumonia and Reactive airway disease  ,  does not have any pertinent problems on file  ,   has a past surgical history that includes Neck surgery (10/2009); Neuroplasty / transposition median nerve at carpal tunnel; and Total thyroidectomy  ,  family history includes Breast cancer (age of onset: 79) in her paternal grandmother; Diabetes in her mother; Heart disease in her father; Hypertension in her mother; No Known Problems in her daughter, daughter, maternal aunt, maternal aunt, and paternal aunt  ,   reports that she quit smoking about 12 years ago  She has never used smokeless tobacco  She reports that she does not drink alcohol and does not use drugs  ,  has No Known Allergies       Review of Systems   Constitutional: Negative  Respiratory: Negative  Cardiovascular: Negative  Musculoskeletal: Negative  Psychiatric/Behavioral: Negative            Objective:  /80 (BP Location: Right arm)   Pulse 93   Temp 98 3 °F (36 8 °C) (Tympanic)   Resp 14   Ht 5' 1" (1 549 m)   Wt 57 6 kg (127 lb)   SpO2 97%   BMI 24 00 kg/m²     Lab Review  Appointment on 10/31/2022 Component Date Value   • WBC 10/31/2022 4 53    • RBC 10/31/2022 4 35    • Hemoglobin 10/31/2022 13 1    • Hematocrit 10/31/2022 39 6    • MCV 10/31/2022 91    • MCH 10/31/2022 30 1    • MCHC 10/31/2022 33 1    • RDW 10/31/2022 13 3    • MPV 10/31/2022 11 3    • Platelets 98/14/5253 228    • nRBC 10/31/2022 0    • Neutrophils Relative 10/31/2022 56    • Immat GRANS % 10/31/2022 0    • Lymphocytes Relative 10/31/2022 32    • Monocytes Relative 10/31/2022 9    • Eosinophils Relative 10/31/2022 2    • Basophils Relative 10/31/2022 1    • Neutrophils Absolute 10/31/2022 2 56    • Immature Grans Absolute 10/31/2022 0 01    • Lymphocytes Absolute 10/31/2022 1 45    • Monocytes Absolute 10/31/2022 0 41    • Eosinophils Absolute 10/31/2022 0 07    • Basophils Absolute 10/31/2022 0 03    • Sodium 10/31/2022 139    • Potassium 10/31/2022 4 0    • Chloride 10/31/2022 108    • CO2 10/31/2022 29    • ANION GAP 10/31/2022 2 (A)   • BUN 10/31/2022 11    • Creatinine 10/31/2022 0 65    • Glucose, Fasting 10/31/2022 102 (A)   • Calcium 10/31/2022 9 2    • AST 10/31/2022 11    • ALT 10/31/2022 18    • Alkaline Phosphatase 10/31/2022 41 (A)   • Total Protein 10/31/2022 7 2    • Albumin 10/31/2022 3 6    • Total Bilirubin 10/31/2022 0 49    • eGFR 10/31/2022 88    • Hemoglobin A1C 10/31/2022 5 6    • EAG 10/31/2022 114    • Cholesterol 10/31/2022 221 (A)   • Triglycerides 10/31/2022 78    • HDL, Direct 10/31/2022 96    • LDL Calculated 10/31/2022 109 (A)   • Non-HDL-Chol (CHOL-HDL) 10/31/2022 125    • TSH 3RD GENERATON 10/31/2022 2 300         Imaging: No results found  Physical Exam  Constitutional:       Appearance: She is well-developed  Cardiovascular:      Rate and Rhythm: Normal rate and regular rhythm  Heart sounds: Normal heart sounds  Pulmonary:      Effort: Pulmonary effort is normal       Breath sounds: Normal breath sounds  Musculoskeletal:         General: Normal range of motion     Neurological: Mental Status: She is alert and oriented to person, place, and time  Deep Tendon Reflexes: Reflexes are normal and symmetric  Psychiatric:         Behavior: Behavior normal          Thought Content:  Thought content normal          Judgment: Judgment normal

## 2022-11-04 DIAGNOSIS — R05.9 COUGH: ICD-10-CM

## 2022-11-04 RX ORDER — ALBUTEROL SULFATE 90 UG/1
AEROSOL, METERED RESPIRATORY (INHALATION)
Qty: 8.5 G | Refills: 2 | Status: SHIPPED | OUTPATIENT
Start: 2022-11-04

## 2022-12-17 DIAGNOSIS — E78.5 HYPERLIPIDEMIA, UNSPECIFIED HYPERLIPIDEMIA TYPE: ICD-10-CM

## 2022-12-17 DIAGNOSIS — I10 HYPERTENSION, UNSPECIFIED TYPE: ICD-10-CM

## 2022-12-19 RX ORDER — LISINOPRIL 30 MG/1
TABLET ORAL
Qty: 90 TABLET | Refills: 0 | Status: SHIPPED | OUTPATIENT
Start: 2022-12-19

## 2022-12-19 RX ORDER — PRAVASTATIN SODIUM 80 MG/1
TABLET ORAL
Qty: 90 TABLET | Refills: 0 | Status: SHIPPED | OUTPATIENT
Start: 2022-12-19

## 2023-01-16 ENCOUNTER — CLINICAL SUPPORT (OUTPATIENT)
Dept: INTERNAL MEDICINE CLINIC | Facility: CLINIC | Age: 73
End: 2023-01-16

## 2023-01-16 DIAGNOSIS — M81.0 AGE RELATED OSTEOPOROSIS, UNSPECIFIED PATHOLOGICAL FRACTURE PRESENCE: Primary | ICD-10-CM

## 2023-03-13 DIAGNOSIS — I10 HYPERTENSION, UNSPECIFIED TYPE: ICD-10-CM

## 2023-03-13 RX ORDER — LISINOPRIL 30 MG/1
30 TABLET ORAL DAILY
Qty: 90 TABLET | Refills: 0 | Status: SHIPPED | OUTPATIENT
Start: 2023-03-13

## 2023-03-20 DIAGNOSIS — E78.5 HYPERLIPIDEMIA, UNSPECIFIED HYPERLIPIDEMIA TYPE: ICD-10-CM

## 2023-03-20 RX ORDER — PRAVASTATIN SODIUM 80 MG/1
80 TABLET ORAL DAILY
Qty: 90 TABLET | Refills: 0 | Status: SHIPPED | OUTPATIENT
Start: 2023-03-20

## 2023-05-03 ENCOUNTER — TELEPHONE (OUTPATIENT)
Dept: HEMATOLOGY ONCOLOGY | Facility: CLINIC | Age: 73
End: 2023-05-03

## 2023-05-03 NOTE — TELEPHONE ENCOUNTER
Appointment Change  Cancel, Reschedule, Change to Virtual      Who are you speaking with? Patient   If it is not the patient, are they listed on an active communication consent form? N/A   Which provider is the appointment scheduled with? Merissa Chaudhry PA-C   When is the appointment scheduled? Please list date and time 5/11/23   At which location is the appointment scheduled to take place? [facility]   Was the appointment rescheduled or changed from an in person visit to a virtual visit? If so, please list the details of the change  No   What is the reason for the appointment change? Patient will call back for reshedule   Was STAR transport scheduled for this visit? No   Does STAR transport need to be scheduled for the new visit (if applicable) No   Does the patient need an infusion appointment rescheduled? No   Does the patient have an infusion appointment scheduled? If so, when? No   Is the patient undergoing chemotherapy? No   Was the no-show policy reviewed for appointments being changed with less then 24 hours of notice?  Yes

## 2023-06-02 ENCOUNTER — OFFICE VISIT (OUTPATIENT)
Dept: INTERNAL MEDICINE CLINIC | Facility: CLINIC | Age: 73
End: 2023-06-02

## 2023-06-02 VITALS
WEIGHT: 125.2 LBS | DIASTOLIC BLOOD PRESSURE: 72 MMHG | HEIGHT: 61 IN | BODY MASS INDEX: 23.64 KG/M2 | OXYGEN SATURATION: 96 % | SYSTOLIC BLOOD PRESSURE: 140 MMHG | RESPIRATION RATE: 14 BRPM | HEART RATE: 78 BPM

## 2023-06-02 DIAGNOSIS — E03.9 HYPOTHYROIDISM, UNSPECIFIED TYPE: ICD-10-CM

## 2023-06-02 DIAGNOSIS — E55.9 VITAMIN D DEFICIENCY: ICD-10-CM

## 2023-06-02 DIAGNOSIS — R79.9 ABNORMAL FINDING OF BLOOD CHEMISTRY, UNSPECIFIED: ICD-10-CM

## 2023-06-02 DIAGNOSIS — E78.5 HYPERLIPIDEMIA, UNSPECIFIED HYPERLIPIDEMIA TYPE: Primary | ICD-10-CM

## 2023-06-02 DIAGNOSIS — M81.0 AGE RELATED OSTEOPOROSIS, UNSPECIFIED PATHOLOGICAL FRACTURE PRESENCE: ICD-10-CM

## 2023-06-02 DIAGNOSIS — Z12.31 SCREENING MAMMOGRAM FOR BREAST CANCER: ICD-10-CM

## 2023-06-02 DIAGNOSIS — J31.0 CHRONIC RHINITIS: ICD-10-CM

## 2023-06-02 DIAGNOSIS — D47.2 MONOCLONAL GAMMOPATHY OF UNDETERMINED SIGNIFICANCE: ICD-10-CM

## 2023-06-02 DIAGNOSIS — I10 HYPERTENSION, UNSPECIFIED TYPE: ICD-10-CM

## 2023-06-02 DIAGNOSIS — R05.9 COUGH: ICD-10-CM

## 2023-06-02 PROBLEM — Z00.00 MEDICARE ANNUAL WELLNESS VISIT, SUBSEQUENT: Status: RESOLVED | Noted: 2020-03-03 | Resolved: 2023-06-02

## 2023-06-02 PROBLEM — Z12.11 SCREENING FOR MALIGNANT NEOPLASM OF COLON: Status: RESOLVED | Noted: 2019-08-12 | Resolved: 2023-06-02

## 2023-06-02 RX ORDER — ALBUTEROL SULFATE 90 UG/1
2 AEROSOL, METERED RESPIRATORY (INHALATION) EVERY 4 HOURS PRN
Qty: 8.5 G | Refills: 2 | Status: SHIPPED | OUTPATIENT
Start: 2023-06-02

## 2023-06-02 NOTE — PROGRESS NOTES
Assessment/Plan:     Panda Vasquez is here to establish care; she is a very pleasant 66 yo female with PMH for controlled HTN, hypothyroidism and is on levothyroxine; she is taking aspirin for CV prevention; HLD on statin; she was following with hematology for MGUS but says that her labs have always been normal and would like to hold off on any further appointments at this time, causes her extra stress when she goes, will monitor labs  Needs a mammogram and updated labs; refills sent  She is on prolia for OP and will be coming back in July for injection  Quality Measures:       Depression Screening and Follow-up Plan: Patient was screened for depression during today's encounter  They screened negative with a PHQ-2 score of 0  Return in about 6 months (around 12/2/2023) for regular and medicare  No problem-specific Assessment & Plan notes found for this encounter  Diagnoses and all orders for this visit:    Hyperlipidemia, unspecified hyperlipidemia type  -     CBC and differential; Future  -     Comprehensive metabolic panel; Future  -     Hemoglobin A1C; Future  -     Lipid Panel with Direct LDL reflex; Future  -     Hemoglobin A1C; Future  -     Lipid panel; Future  -     CBC; Future    Cough  -     albuterol (PROVENTIL HFA,VENTOLIN HFA) 90 mcg/act inhaler; Inhale 2 puffs every 4 (four) hours as needed for shortness of breath    Hypertension, unspecified type  -     CBC and differential; Future  -     Comprehensive metabolic panel; Future  -     Comprehensive metabolic panel; Future    Age related osteoporosis, unspecified pathological fracture presence    Monoclonal gammopathy of undetermined significance IgG lambda    Hypothyroidism, unspecified type  -     TSH, 3rd generation with Free T4 reflex; Future  -     TSH, 3rd generation; Future    Chronic rhinitis    Vitamin D deficiency  -     Vitamin D 25 hydroxy;  Future    Abnormal finding of blood chemistry, unspecified  -     Hemoglobin A1C; Future  -     Hemoglobin A1C; Future    Screening mammogram for breast cancer  -     Mammo screening bilateral w 3d & cad; Future          Subjective:      Patient ID: Gary Barrett is a 67 y o  female  Here to establish care; former [facility] pt        ALLERGIES:  No Known Allergies    CURRENT MEDICATIONS:    Current Outpatient Medications:   •  albuterol (PROVENTIL HFA,VENTOLIN HFA) 90 mcg/act inhaler, Inhale 2 puffs every 4 (four) hours as needed for shortness of breath, Disp: 8 5 g, Rfl: 2  •  aspirin 81 MG tablet, Take 1 tablet by mouth daily, Disp: , Rfl:   •  calcium carbonate (OS-LAUREN) 600 MG tablet, Take 600 mg by mouth daily, Disp: , Rfl:   •  Cholecalciferol (VITAMIN D3) 2000 units capsule, daily, Disp: 100 capsule, Rfl: 0  •  denosumab (PROLIA) 60 mg/mL, Inject 1 mL (60 mg total) under the skin once for 1 dose, Disp: 1 mL, Rfl: 0  •  Flaxseed, Linseed, (FLAXSEED OIL) 1000 MG CAPS, Take 2 capsules by mouth daily, Disp: , Rfl:   •  levothyroxine 75 mcg tablet, TAKE 1 TABLET(75 MCG) BY MOUTH DAILY EARLY MORNING, Disp: 90 tablet, Rfl: 3  •  lisinopril (ZESTRIL) 30 mg tablet, Take 1 tablet (30 mg total) by mouth daily, Disp: 90 tablet, Rfl: 0  •  Peak Flow Meter CORNELIA, by Does not apply route, Disp: , Rfl:   •  pravastatin (PRAVACHOL) 80 mg tablet, Take 1 tablet (80 mg total) by mouth daily, Disp: 90 tablet, Rfl: 0  •  vitamin E, tocopherol, 400 units capsule, Take 1 capsule (400 Units total) by mouth daily, Disp: , Rfl:     ACTIVE PROBLEM LIST:  Patient Active Problem List   Diagnosis   • Fibrocystic breast disease   • Hyperlipidemia   • Hypertension   • Hypothyroidism   • Osteoporosis   • Tubular adenoma   • Chronic rhinitis   • Monoclonal gammopathy of undetermined significance IgG lambda   • Encounter for screening mammogram for malignant neoplasm of breast   • Puncture wound       PAST MEDICAL HISTORY:  Past Medical History:   Diagnosis Date   • Pneumonia     RESOLVED: [id number]   • Reactive airway disease RESOLVED: [id number]       PAST SURGICAL HISTORY:  Past Surgical History:   Procedure Laterality Date   • NECK SURGERY  10/2009    LESION BIOPSY OF NECK MASS   • NEUROPLASTY / TRANSPOSITION MEDIAN NERVE AT CARPAL TUNNEL     • TOTAL THYROIDECTOMY      RESOLVED: [id number]       FAMILY HISTORY:  Family History   Problem Relation Age of Onset   • Diabetes Mother    • Hypertension Mother    • Heart disease Father    • No Known Problems Daughter    • No Known Problems Daughter    • Breast cancer Paternal Grandmother 79   • No Known Problems Maternal Aunt    • No Known Problems Maternal Aunt    • No Known Problems Paternal Aunt        SOCIAL HISTORY:  Social History     Socioeconomic History   • Marital status: /Civil Union     Spouse name: Not on file   • Number of children: Not on file   • Years of education: Not on file   • Highest education level: Not on file   Occupational History   • Not on file   Tobacco Use   • Smoking status: Former     Types: Cigarettes     Quit date: 7/3/2010     Years since quittin 9   • Smokeless tobacco: Never   Vaping Use   • Vaping Use: Never used   Substance and Sexual Activity   • Alcohol use: No   • Drug use: No   • Sexual activity: Yes   Other Topics Concern   • Not on file   Social History Narrative    Caffeine use      Social Determinants of Health     Financial Resource Strain: Low Risk  (2022)    Overall Financial Resource Strain (CARDIA)    • Difficulty of Paying Living Expenses: Not hard at all   Food Insecurity: Not on file   Transportation Needs: No Transportation Needs (2022)    PRAPARE - Transportation    • Lack of Transportation (Medical): No    • Lack of Transportation (Non-Medical): No   Physical Activity: Not on file   Stress: Not on file   Social Connections: Not on file   Intimate Partner Violence: Not on file   Housing Stability: Not on file       Review of Systems   Constitutional: Negative for chills and fever     HENT: Negative for ear pain and "sore throat  Eyes: Negative for pain and visual disturbance  Respiratory: Negative for cough and shortness of breath  Cardiovascular: Negative for chest pain and palpitations  Gastrointestinal: Negative for abdominal pain and vomiting  Genitourinary: Negative for dysuria and hematuria  Musculoskeletal: Negative for arthralgias and back pain  Skin: Negative for color change and rash  Neurological: Negative for seizures and syncope  All other systems reviewed and are negative  Objective:  Vitals:    06/02/23 0922   BP: 140/72   BP Location: Right arm   Patient Position: Sitting   Cuff Size: Adult   Pulse: 78   Resp: 14   SpO2: 96%   Weight: 56 8 kg (125 lb 3 2 oz)   Height: 5' 1\" (1 549 m)     Body mass index is 23 66 kg/m²  Physical Exam  Vitals and nursing note reviewed  Constitutional:       Appearance: Normal appearance  She is normal weight  HENT:      Head: Normocephalic and atraumatic  Cardiovascular:      Rate and Rhythm: Normal rate and regular rhythm  Heart sounds: Normal heart sounds  Pulmonary:      Effort: Pulmonary effort is normal       Breath sounds: Normal breath sounds  Musculoskeletal:         General: Normal range of motion  Cervical back: Normal range of motion  Right lower leg: No edema  Left lower leg: No edema  Lymphadenopathy:      Cervical: No cervical adenopathy  Skin:     General: Skin is warm and dry  Neurological:      General: No focal deficit present  Mental Status: She is alert and oriented to person, place, and time  Mental status is at baseline  Psychiatric:         Mood and Affect: Mood normal            RESULTS:    In chart    This note was created with voice recognition software  Phonic, grammatical and spelling errors may be present within the note as a result      "

## 2023-06-07 ENCOUNTER — APPOINTMENT (OUTPATIENT)
Dept: LAB | Facility: HOSPITAL | Age: 73
End: 2023-06-07
Attending: INTERNAL MEDICINE
Payer: MEDICARE

## 2023-06-07 DIAGNOSIS — R79.9 ABNORMAL FINDING OF BLOOD CHEMISTRY, UNSPECIFIED: ICD-10-CM

## 2023-06-07 DIAGNOSIS — E55.9 VITAMIN D DEFICIENCY: ICD-10-CM

## 2023-06-07 DIAGNOSIS — E03.9 HYPOTHYROIDISM, UNSPECIFIED TYPE: ICD-10-CM

## 2023-06-07 DIAGNOSIS — I10 HYPERTENSION, UNSPECIFIED TYPE: ICD-10-CM

## 2023-06-07 DIAGNOSIS — E78.5 HYPERLIPIDEMIA, UNSPECIFIED HYPERLIPIDEMIA TYPE: ICD-10-CM

## 2023-06-07 LAB
25(OH)D3 SERPL-MCNC: 45.3 NG/ML (ref 30–100)
ALBUMIN SERPL BCP-MCNC: 4.4 G/DL (ref 3.5–5)
ALP SERPL-CCNC: 39 U/L (ref 34–104)
ALT SERPL W P-5'-P-CCNC: 10 U/L (ref 7–52)
ANION GAP SERPL CALCULATED.3IONS-SCNC: 5 MMOL/L (ref 4–13)
AST SERPL W P-5'-P-CCNC: 13 U/L (ref 13–39)
BASOPHILS # BLD AUTO: 0.02 THOUSANDS/ÂΜL (ref 0–0.1)
BASOPHILS NFR BLD AUTO: 1 % (ref 0–1)
BILIRUB SERPL-MCNC: 0.46 MG/DL (ref 0.2–1)
BUN SERPL-MCNC: 17 MG/DL (ref 5–25)
CALCIUM SERPL-MCNC: 8.9 MG/DL (ref 8.4–10.2)
CHLORIDE SERPL-SCNC: 105 MMOL/L (ref 96–108)
CHOLEST SERPL-MCNC: 230 MG/DL
CO2 SERPL-SCNC: 29 MMOL/L (ref 21–32)
CREAT SERPL-MCNC: 0.68 MG/DL (ref 0.6–1.3)
EOSINOPHIL # BLD AUTO: 0.06 THOUSAND/ÂΜL (ref 0–0.61)
EOSINOPHIL NFR BLD AUTO: 2 % (ref 0–6)
ERYTHROCYTE [DISTWIDTH] IN BLOOD BY AUTOMATED COUNT: 13.2 % (ref 11.6–15.1)
EST. AVERAGE GLUCOSE BLD GHB EST-MCNC: 100 MG/DL
GFR SERPL CREATININE-BSD FRML MDRD: 87 ML/MIN/1.73SQ M
GLUCOSE P FAST SERPL-MCNC: 87 MG/DL (ref 65–99)
HBA1C MFR BLD: 5.1 %
HCT VFR BLD AUTO: 38.5 % (ref 34.8–46.1)
HDLC SERPL-MCNC: 83 MG/DL
HGB BLD-MCNC: 12.9 G/DL (ref 11.5–15.4)
IMM GRANULOCYTES # BLD AUTO: 0.01 THOUSAND/UL (ref 0–0.2)
IMM GRANULOCYTES NFR BLD AUTO: 0 % (ref 0–2)
LDLC SERPL CALC-MCNC: 133 MG/DL (ref 0–100)
LYMPHOCYTES # BLD AUTO: 1.48 THOUSANDS/ÂΜL (ref 0.6–4.47)
LYMPHOCYTES NFR BLD AUTO: 37 % (ref 14–44)
MCH RBC QN AUTO: 30.4 PG (ref 26.8–34.3)
MCHC RBC AUTO-ENTMCNC: 33.5 G/DL (ref 31.4–37.4)
MCV RBC AUTO: 91 FL (ref 82–98)
MONOCYTES # BLD AUTO: 0.36 THOUSAND/ÂΜL (ref 0.17–1.22)
MONOCYTES NFR BLD AUTO: 9 % (ref 4–12)
NEUTROPHILS # BLD AUTO: 2.13 THOUSANDS/ÂΜL (ref 1.85–7.62)
NEUTS SEG NFR BLD AUTO: 51 % (ref 43–75)
NRBC BLD AUTO-RTO: 0 /100 WBCS
PLATELET # BLD AUTO: 228 THOUSANDS/UL (ref 149–390)
PMV BLD AUTO: 11.4 FL (ref 8.9–12.7)
POTASSIUM SERPL-SCNC: 3.7 MMOL/L (ref 3.5–5.3)
PROT SERPL-MCNC: 6.9 G/DL (ref 6.4–8.4)
RBC # BLD AUTO: 4.25 MILLION/UL (ref 3.81–5.12)
SODIUM SERPL-SCNC: 139 MMOL/L (ref 135–147)
TRIGL SERPL-MCNC: 72 MG/DL
TSH SERPL DL<=0.05 MIU/L-ACNC: 1.37 UIU/ML (ref 0.45–4.5)
WBC # BLD AUTO: 4.06 THOUSAND/UL (ref 4.31–10.16)

## 2023-06-07 PROCEDURE — 83036 HEMOGLOBIN GLYCOSYLATED A1C: CPT

## 2023-06-07 PROCEDURE — 80053 COMPREHEN METABOLIC PANEL: CPT

## 2023-06-07 PROCEDURE — 36415 COLL VENOUS BLD VENIPUNCTURE: CPT

## 2023-06-07 PROCEDURE — 80061 LIPID PANEL: CPT

## 2023-06-07 PROCEDURE — 85025 COMPLETE CBC W/AUTO DIFF WBC: CPT

## 2023-06-07 PROCEDURE — 84443 ASSAY THYROID STIM HORMONE: CPT

## 2023-06-07 PROCEDURE — 82306 VITAMIN D 25 HYDROXY: CPT

## 2023-06-08 DIAGNOSIS — E78.5 HYPERLIPIDEMIA, UNSPECIFIED HYPERLIPIDEMIA TYPE: ICD-10-CM

## 2023-06-12 ENCOUNTER — TELEPHONE (OUTPATIENT)
Dept: INTERNAL MEDICINE CLINIC | Facility: CLINIC | Age: 73
End: 2023-06-12

## 2023-06-12 NOTE — TELEPHONE ENCOUNTER
Gerardo Tony LPN   3/5/4284  6:54 PM EDT Back to Top      Pt stated she is taking her statin  Santiago Douglass MD   6/8/2023 12:39 PM EDT       Lipid panel abnormal; is she taking her statin?   Kidney, liver normal   Thyroid normal   Blood counts normal

## 2023-06-21 ENCOUNTER — TELEPHONE (OUTPATIENT)
Dept: INTERNAL MEDICINE CLINIC | Facility: CLINIC | Age: 73
End: 2023-06-21

## 2023-06-21 NOTE — TELEPHONE ENCOUNTER
Pt called and cancelled her prolia shot in July,  And says she doesn't want to risk side effects and will not want the shot any longer      bonnie carlson

## 2023-06-26 DIAGNOSIS — E78.5 HYPERLIPIDEMIA, UNSPECIFIED HYPERLIPIDEMIA TYPE: ICD-10-CM

## 2023-06-26 DIAGNOSIS — I10 HYPERTENSION, UNSPECIFIED TYPE: ICD-10-CM

## 2023-06-26 RX ORDER — PRAVASTATIN SODIUM 80 MG/1
80 TABLET ORAL DAILY
Qty: 90 TABLET | Refills: 0 | Status: SHIPPED | OUTPATIENT
Start: 2023-06-26

## 2023-06-26 RX ORDER — LISINOPRIL 30 MG/1
30 TABLET ORAL DAILY
Qty: 90 TABLET | Refills: 0 | Status: SHIPPED | OUTPATIENT
Start: 2023-06-26

## 2023-07-03 DIAGNOSIS — I10 HYPERTENSION, UNSPECIFIED TYPE: ICD-10-CM

## 2023-07-03 RX ORDER — LISINOPRIL 30 MG/1
30 TABLET ORAL DAILY
Qty: 90 TABLET | Refills: 0 | OUTPATIENT
Start: 2023-07-03

## 2023-07-03 NOTE — TELEPHONE ENCOUNTER
P/t needs new prescription written     Medication Refill Request     Name lisinopril (ZESTRIL)  Dose/Frequency 30 mg tablet  Quantity 90  Verified pharmacy   [x]  Verified ordering Provider   [x]  Does patient have enough for the next 3 days?  Yes [x] No []

## 2023-08-03 ENCOUNTER — HOSPITAL ENCOUNTER (OUTPATIENT)
Age: 73
Discharge: HOME | End: 2023-08-03
Payer: MEDICARE

## 2023-08-03 DIAGNOSIS — Z12.31 SCREENING MAMMOGRAM FOR BREAST CANCER: ICD-10-CM

## 2023-08-03 PROCEDURE — 77063 BREAST TOMOSYNTHESIS BI: CPT

## 2023-08-03 PROCEDURE — 77067 SCR MAMMO BI INCL CAD: CPT

## 2023-08-14 ENCOUNTER — TELEPHONE (OUTPATIENT)
Dept: INTERNAL MEDICINE CLINIC | Facility: CLINIC | Age: 73
End: 2023-08-14

## 2023-09-05 DIAGNOSIS — E03.9 HYPOTHYROIDISM, UNSPECIFIED TYPE: ICD-10-CM

## 2023-09-05 RX ORDER — LEVOTHYROXINE SODIUM 0.07 MG/1
TABLET ORAL
Qty: 90 TABLET | Refills: 3 | Status: SHIPPED | OUTPATIENT
Start: 2023-09-05

## 2023-10-09 DIAGNOSIS — I10 HYPERTENSION, UNSPECIFIED TYPE: ICD-10-CM

## 2023-10-09 RX ORDER — LISINOPRIL 30 MG/1
30 TABLET ORAL DAILY
Qty: 90 TABLET | Refills: 0 | Status: SHIPPED | OUTPATIENT
Start: 2023-10-09

## 2023-10-09 RX ORDER — LISINOPRIL 30 MG/1
30 TABLET ORAL DAILY
Qty: 90 TABLET | Refills: 0 | Status: SHIPPED | OUTPATIENT
Start: 2023-10-09 | End: 2023-10-09 | Stop reason: SDUPTHER

## 2023-10-30 DIAGNOSIS — E78.5 HYPERLIPIDEMIA, UNSPECIFIED HYPERLIPIDEMIA TYPE: ICD-10-CM

## 2023-10-30 RX ORDER — PRAVASTATIN SODIUM 80 MG/1
80 TABLET ORAL DAILY
Qty: 90 TABLET | Refills: 0 | Status: SHIPPED | OUTPATIENT
Start: 2023-10-30

## 2023-11-29 ENCOUNTER — APPOINTMENT (OUTPATIENT)
Dept: LAB | Facility: HOSPITAL | Age: 73
End: 2023-11-29
Attending: INTERNAL MEDICINE
Payer: MEDICARE

## 2023-11-29 DIAGNOSIS — I10 HYPERTENSION, UNSPECIFIED TYPE: ICD-10-CM

## 2023-11-29 DIAGNOSIS — E78.5 HYPERLIPIDEMIA, UNSPECIFIED HYPERLIPIDEMIA TYPE: ICD-10-CM

## 2023-11-29 DIAGNOSIS — E03.9 HYPOTHYROIDISM, UNSPECIFIED TYPE: ICD-10-CM

## 2023-11-29 DIAGNOSIS — R79.9 ABNORMAL FINDING OF BLOOD CHEMISTRY, UNSPECIFIED: ICD-10-CM

## 2023-11-29 LAB
ALBUMIN SERPL BCP-MCNC: 4.4 G/DL (ref 3.5–5)
ALP SERPL-CCNC: 46 U/L (ref 34–104)
ALT SERPL W P-5'-P-CCNC: 11 U/L (ref 7–52)
ANION GAP SERPL CALCULATED.3IONS-SCNC: 4 MMOL/L
AST SERPL W P-5'-P-CCNC: 14 U/L (ref 13–39)
BILIRUB SERPL-MCNC: 0.54 MG/DL (ref 0.2–1)
BUN SERPL-MCNC: 14 MG/DL (ref 5–25)
CALCIUM SERPL-MCNC: 9.4 MG/DL (ref 8.4–10.2)
CHLORIDE SERPL-SCNC: 104 MMOL/L (ref 96–108)
CHOLEST SERPL-MCNC: 218 MG/DL
CO2 SERPL-SCNC: 34 MMOL/L (ref 21–32)
CREAT SERPL-MCNC: 0.66 MG/DL (ref 0.6–1.3)
ERYTHROCYTE [DISTWIDTH] IN BLOOD BY AUTOMATED COUNT: 13.2 % (ref 11.6–15.1)
EST. AVERAGE GLUCOSE BLD GHB EST-MCNC: 114 MG/DL
GFR SERPL CREATININE-BSD FRML MDRD: 87 ML/MIN/1.73SQ M
GLUCOSE P FAST SERPL-MCNC: 95 MG/DL (ref 65–99)
HBA1C MFR BLD: 5.6 %
HCT VFR BLD AUTO: 38.1 % (ref 34.8–46.1)
HDLC SERPL-MCNC: 92 MG/DL
HGB BLD-MCNC: 12.7 G/DL (ref 11.5–15.4)
LDLC SERPL CALC-MCNC: 113 MG/DL (ref 0–100)
MCH RBC QN AUTO: 30.1 PG (ref 26.8–34.3)
MCHC RBC AUTO-ENTMCNC: 33.3 G/DL (ref 31.4–37.4)
MCV RBC AUTO: 90 FL (ref 82–98)
NONHDLC SERPL-MCNC: 126 MG/DL
PLATELET # BLD AUTO: 230 THOUSANDS/UL (ref 149–390)
PMV BLD AUTO: 11.1 FL (ref 8.9–12.7)
POTASSIUM SERPL-SCNC: 3.8 MMOL/L (ref 3.5–5.3)
PROT SERPL-MCNC: 7.1 G/DL (ref 6.4–8.4)
RBC # BLD AUTO: 4.22 MILLION/UL (ref 3.81–5.12)
SODIUM SERPL-SCNC: 142 MMOL/L (ref 135–147)
TRIGL SERPL-MCNC: 66 MG/DL
TSH SERPL DL<=0.05 MIU/L-ACNC: 0.55 UIU/ML (ref 0.45–4.5)
WBC # BLD AUTO: 3.93 THOUSAND/UL (ref 4.31–10.16)

## 2023-11-29 PROCEDURE — 85027 COMPLETE CBC AUTOMATED: CPT

## 2023-11-29 PROCEDURE — 80053 COMPREHEN METABOLIC PANEL: CPT

## 2023-11-29 PROCEDURE — 36415 COLL VENOUS BLD VENIPUNCTURE: CPT

## 2023-11-29 PROCEDURE — 80061 LIPID PANEL: CPT

## 2023-11-29 PROCEDURE — 84443 ASSAY THYROID STIM HORMONE: CPT

## 2023-11-29 PROCEDURE — 83036 HEMOGLOBIN GLYCOSYLATED A1C: CPT

## 2023-12-06 ENCOUNTER — OFFICE VISIT (OUTPATIENT)
Dept: INTERNAL MEDICINE CLINIC | Facility: CLINIC | Age: 73
End: 2023-12-06
Payer: MEDICARE

## 2023-12-06 VITALS
BODY MASS INDEX: 22.92 KG/M2 | DIASTOLIC BLOOD PRESSURE: 90 MMHG | WEIGHT: 121.4 LBS | HEIGHT: 61 IN | OXYGEN SATURATION: 99 % | SYSTOLIC BLOOD PRESSURE: 140 MMHG | HEART RATE: 77 BPM

## 2023-12-06 DIAGNOSIS — Z13.1 DIABETES MELLITUS SCREENING: ICD-10-CM

## 2023-12-06 DIAGNOSIS — I10 HYPERTENSION, UNSPECIFIED TYPE: ICD-10-CM

## 2023-12-06 DIAGNOSIS — E78.5 HYPERLIPIDEMIA, UNSPECIFIED HYPERLIPIDEMIA TYPE: ICD-10-CM

## 2023-12-06 DIAGNOSIS — D47.2 MONOCLONAL GAMMOPATHY OF UNDETERMINED SIGNIFICANCE: ICD-10-CM

## 2023-12-06 DIAGNOSIS — R79.9 ABNORMAL FINDING OF BLOOD CHEMISTRY, UNSPECIFIED: ICD-10-CM

## 2023-12-06 DIAGNOSIS — M81.0 AGE RELATED OSTEOPOROSIS, UNSPECIFIED PATHOLOGICAL FRACTURE PRESENCE: ICD-10-CM

## 2023-12-06 DIAGNOSIS — Z00.00 MEDICARE ANNUAL WELLNESS VISIT, SUBSEQUENT: Primary | ICD-10-CM

## 2023-12-06 DIAGNOSIS — E03.9 HYPOTHYROIDISM, UNSPECIFIED TYPE: ICD-10-CM

## 2023-12-06 PROBLEM — T14.8XXA PUNCTURE WOUND: Status: RESOLVED | Noted: 2021-11-01 | Resolved: 2023-12-06

## 2023-12-06 PROCEDURE — G0439 PPPS, SUBSEQ VISIT: HCPCS | Performed by: INTERNAL MEDICINE

## 2023-12-06 PROCEDURE — 99214 OFFICE O/P EST MOD 30 MIN: CPT | Performed by: INTERNAL MEDICINE

## 2023-12-06 NOTE — PATIENT INSTRUCTIONS
Medicare Preventive Visit Patient Instructions  Thank you for completing your Welcome to Medicare Visit or Medicare Annual Wellness Visit today. Your next wellness visit will be due in one year (12/6/2024). The screening/preventive services that you may require over the next 5-10 years are detailed below. Some tests may not apply to you based off risk factors and/or age. Screening tests ordered at today's visit but not completed yet may show as past due. Also, please note that scanned in results may not display below. Preventive Screenings:  Service Recommendations Previous Testing/Comments   Colorectal Cancer Screening  * Colonoscopy    * Fecal Occult Blood Test (FOBT)/Fecal Immunochemical Test (FIT)  * Fecal DNA/Cologuard Test  * Flexible Sigmoidoscopy Age: 43-73 years old   Colonoscopy: every 10 years (may be performed more frequently if at higher risk)  OR  FOBT/FIT: every 1 year  OR  Cologuard: every 3 years  OR  Sigmoidoscopy: every 5 years  Screening may be recommended earlier than age 39 if at higher risk for colorectal cancer. Also, an individualized decision between you and your healthcare provider will decide whether screening between the ages of 77-80 would be appropriate. Colonoscopy: 08/26/2019  FOBT/FIT: Not on file  Cologuard: Not on file  Sigmoidoscopy: Not on file          Breast Cancer Screening Age: 36 years old  Frequency: every 1-2 years  Not required if history of left and right mastectomy Mammogram: 08/03/2023    Screening Current   Cervical Cancer Screening Between the ages of 21-29, pap smear recommended once every 3 years. Between the ages of 32-69, can perform pap smear with HPV co-testing every 5 years.    Recommendations may differ for women with a history of total hysterectomy, cervical cancer, or abnormal pap smears in past. Pap Smear: Not on file    Screening Not Indicated   Hepatitis C Screening Once for adults born between [address] [place] [address]  More frequently in patients at high risk for Hepatitis C Hep C Antibody: 06/28/2018    Screening Current   Diabetes Screening 1-2 times per year if you're at risk for diabetes or have pre-diabetes Fasting glucose: 95 mg/dL (11/29/2023)  A1C: 5.6 % (11/29/2023)  Screening Current   Cholesterol Screening Once every 5 years if you don't have a lipid disorder. May order more often based on risk factors. Lipid panel: 11/29/2023    Screening Not Indicated  History Lipid Disorder     Other Preventive Screenings Covered by Medicare:  Abdominal Aortic Aneurysm (AAA) Screening: covered once if your at risk. You're considered to be at risk if you have a family history of AAA. Lung Cancer Screening: covers low dose CT scan once per year if you meet all of the following conditions: (1) Age 48-67; (2) No signs or symptoms of lung cancer; (3) Current smoker or have quit smoking within the last 15 years; (4) You have a tobacco smoking history of at least 20 pack years (packs per day multiplied by number of years you smoked); (5) You get a written order from a healthcare provider. Glaucoma Screening: covered annually if you're considered high risk: (1) You have diabetes OR (2) Family history of glaucoma OR (3)  aged 48 and older OR (3)  American aged 72 and older  Osteoporosis Screening: covered every 2 years if you meet one of the following conditions: (1) You're estrogen deficient and at risk for osteoporosis based off medical history and other findings; (2) Have a vertebral abnormality; (3) On glucocorticoid therapy for more than 3 months; (4) Have primary hyperparathyroidism; (5) On osteoporosis medications and need to assess response to drug therapy. Last bone density test (DXA Scan): 06/21/2021. HIV Screening: covered annually if you're between the age of 14-79. Also covered annually if you are younger than 13 and older than 72 with risk factors for HIV infection.  For pregnant patients, it is covered up to 3 times per pregnancy. Immunizations:  Immunization Recommendations   Influenza Vaccine Annual influenza vaccination during flu season is recommended for all persons aged >= 6 months who do not have contraindications   Pneumococcal Vaccine   * Pneumococcal conjugate vaccine = PCV13 (Prevnar 13), PCV15 (Vaxneuvance), PCV20 (Prevnar 20)  * Pneumococcal polysaccharide vaccine = PPSV23 (Pneumovax) Adults 67-34 yo with certain risk factors or if 69+ yo  If never received any pneumonia vaccine: recommend Prevnar 20 (PCV20)  Give PCV20 if previously received 1 dose of PCV13 or PPSV23   Hepatitis B Vaccine 3 dose series if at intermediate or high risk (ex: diabetes, end stage renal disease, liver disease)   Respiratory syncytial virus (RSV) Vaccine - COVERED BY MEDICARE PART D  * RSVPreF3 (Arexvy) CDC recommends that adults 61years of age and older may receive a single dose of RSV vaccine using shared clinical decision-making (SCDM)   Tetanus (Td) Vaccine - COST NOT COVERED BY MEDICARE PART B Following completion of primary series, a booster dose should be given every 10 years to maintain immunity against tetanus. Td may also be given as tetanus wound prophylaxis. Tdap Vaccine - COST NOT COVERED BY MEDICARE PART B Recommended at least once for all adults. For pregnant patients, recommended with each pregnancy. Shingles Vaccine (Shingrix) - COST NOT COVERED BY MEDICARE PART B  2 shot series recommended in those 19 years and older who have or will have weakened immune systems or those 50 years and older     Health Maintenance Due:      Topic Date Due   • Colorectal Cancer Screening  08/27/2019   • DXA SCAN  06/21/2023   • Breast Cancer Screening: Mammogram  08/03/2024   • Hepatitis C Screening  Completed     Immunizations Due:      Topic Date Due   • COVID-19 Vaccine (5 - Pfizer series) 04/14/2023   • Influenza Vaccine (1) 09/01/2023     Advance Directives   What are advance directives?   Advance directives are legal documents that state your wishes and plans for medical care. These plans are made ahead of time in case you lose your ability to make decisions for yourself. Advance directives can apply to any medical decision, such as the treatments you want, and if you want to donate organs. What are the types of advance directives? There are many types of advance directives, and each state has rules about how to use them. You may choose a combination of any of the following:  Living will: This is a written record of the treatment you want. You can also choose which treatments you do not want, which to limit, and which to stop at a certain time. This includes surgery, medicine, IV fluid, and tube feedings. Durable power of  for [facility]): This is a written record that states who you want to make healthcare choices for you when you are unable to make them for yourself. This person, called a proxy, is usually a family member or a friend. You may choose more than 1 proxy. Do not resuscitate (DNR) order:  A DNR order is used in case your heart stops beating or you stop breathing. It is a request not to have certain forms of treatment, such as CPR. A DNR order may be included in other types of advance directives. Medical directive: This covers the care that you want if you are in a coma, near death, or unable to make decisions for yourself. You can list the treatments you want for each condition. Treatment may include pain medicine, surgery, blood transfusions, dialysis, IV or tube feedings, and a ventilator (breathing machine). Values history: This document has questions about your views, beliefs, and how you feel and think about life. This information can help others choose the care that you would choose. Why are advance directives important? An advance directive helps you control your care. Although spoken wishes may be used, it is better to have your wishes written down.  Spoken wishes can be misunderstood, or not followed. Treatments may be given even if you do not want them. An advance directive may make it easier for your family to make difficult choices about your care. © Copyright [employer] 2018 Information is for End User's use only and may not be sold, redistributed or otherwise used for commercial purposes.  All illustrations and images included in CareNotes® are the copyrighted property of [employer] or [address] [place] [address]

## 2024-01-08 DIAGNOSIS — I10 HYPERTENSION, UNSPECIFIED TYPE: ICD-10-CM

## 2024-01-08 DIAGNOSIS — R05.9 COUGH: ICD-10-CM

## 2024-01-08 RX ORDER — LISINOPRIL 30 MG/1
30 TABLET ORAL DAILY
Qty: 90 TABLET | Refills: 1 | Status: SHIPPED | OUTPATIENT
Start: 2024-01-08

## 2024-01-08 RX ORDER — ALBUTEROL SULFATE 90 UG/1
2 AEROSOL, METERED RESPIRATORY (INHALATION) EVERY 4 HOURS PRN
Qty: 18 G | Refills: 1 | Status: SHIPPED | OUTPATIENT
Start: 2024-01-08

## 2024-02-05 ENCOUNTER — TELEPHONE (OUTPATIENT)
Dept: INTERNAL MEDICINE CLINIC | Facility: CLINIC | Age: 74
End: 2024-02-05

## 2024-02-13 NOTE — PATIENT INSTRUCTIONS
1 hypertension patient's blood pressures at home have been good her machine has been checked here at the office and is accurate does have some white coat syndrome blood pressures reviewed at home have been excellent  2  Hypothyroidism is at goal will need follow-up in 6 months thyroid function tests at that time  3  Patient with hyperlipidemia lipid panel was at goal in February will need lipid panel in 6 months  4   Osteoporosis awaiting Prolia
no

## 2024-02-23 DIAGNOSIS — E78.5 HYPERLIPIDEMIA, UNSPECIFIED HYPERLIPIDEMIA TYPE: ICD-10-CM

## 2024-02-23 RX ORDER — PRAVASTATIN SODIUM 80 MG/1
80 TABLET ORAL DAILY
Qty: 90 TABLET | Refills: 0 | Status: SHIPPED | OUTPATIENT
Start: 2024-02-23

## 2024-05-20 DIAGNOSIS — R05.9 COUGH: ICD-10-CM

## 2024-05-20 RX ORDER — ALBUTEROL SULFATE 90 UG/1
2 AEROSOL, METERED RESPIRATORY (INHALATION) EVERY 4 HOURS PRN
Qty: 18 G | Refills: 1 | Status: SHIPPED | OUTPATIENT
Start: 2024-05-20

## 2024-05-20 NOTE — TELEPHONE ENCOUNTER
Received fax from [facility] requesting refill for pt on     Requested Prescriptions     Pending Prescriptions Disp Refills    albuterol (PROVENTIL HFA,VENTOLIN HFA) 90 mcg/act inhaler 18 g 1     Sig: Inhale 2 puffs every 4 (four) hours as needed for shortness of breath        Next appt 6/12/24  Last seen 12/6/24  Last filled 1/8/24

## 2024-06-04 DIAGNOSIS — E78.5 HYPERLIPIDEMIA, UNSPECIFIED HYPERLIPIDEMIA TYPE: ICD-10-CM

## 2024-06-04 RX ORDER — PRAVASTATIN SODIUM 80 MG/1
80 TABLET ORAL DAILY
Qty: 90 TABLET | Refills: 1 | Status: SHIPPED | OUTPATIENT
Start: 2024-06-04

## 2024-06-05 ENCOUNTER — APPOINTMENT (OUTPATIENT)
Dept: LAB | Facility: HOSPITAL | Age: 74
End: 2024-06-05
Attending: INTERNAL MEDICINE
Payer: MEDICARE

## 2024-06-05 DIAGNOSIS — E03.9 HYPOTHYROIDISM, UNSPECIFIED TYPE: ICD-10-CM

## 2024-06-05 DIAGNOSIS — E78.5 HYPERLIPIDEMIA, UNSPECIFIED HYPERLIPIDEMIA TYPE: ICD-10-CM

## 2024-06-05 DIAGNOSIS — Z13.1 DIABETES MELLITUS SCREENING: ICD-10-CM

## 2024-06-05 DIAGNOSIS — I10 HYPERTENSION, UNSPECIFIED TYPE: ICD-10-CM

## 2024-06-05 DIAGNOSIS — R79.9 ABNORMAL FINDING OF BLOOD CHEMISTRY, UNSPECIFIED: ICD-10-CM

## 2024-06-05 LAB
ALBUMIN SERPL BCP-MCNC: 4.3 G/DL (ref 3.5–5)
ALP SERPL-CCNC: 47 U/L (ref 34–104)
ALT SERPL W P-5'-P-CCNC: 11 U/L (ref 7–52)
ANION GAP SERPL CALCULATED.3IONS-SCNC: 6 MMOL/L (ref 4–13)
AST SERPL W P-5'-P-CCNC: 13 U/L (ref 13–39)
BASOPHILS # BLD AUTO: 0.04 THOUSANDS/ÂΜL (ref 0–0.1)
BASOPHILS NFR BLD AUTO: 1 % (ref 0–1)
BILIRUB SERPL-MCNC: 0.52 MG/DL (ref 0.2–1)
BUN SERPL-MCNC: 14 MG/DL (ref 5–25)
CALCIUM SERPL-MCNC: 9.3 MG/DL (ref 8.4–10.2)
CHLORIDE SERPL-SCNC: 104 MMOL/L (ref 96–108)
CHOLEST SERPL-MCNC: 224 MG/DL
CO2 SERPL-SCNC: 31 MMOL/L (ref 21–32)
CREAT SERPL-MCNC: 0.63 MG/DL (ref 0.6–1.3)
EOSINOPHIL # BLD AUTO: 0.07 THOUSAND/ÂΜL (ref 0–0.61)
EOSINOPHIL NFR BLD AUTO: 2 % (ref 0–6)
ERYTHROCYTE [DISTWIDTH] IN BLOOD BY AUTOMATED COUNT: 13.2 % (ref 11.6–15.1)
EST. AVERAGE GLUCOSE BLD GHB EST-MCNC: 117 MG/DL
GFR SERPL CREATININE-BSD FRML MDRD: 89 ML/MIN/1.73SQ M
GLUCOSE P FAST SERPL-MCNC: 92 MG/DL (ref 65–99)
HBA1C MFR BLD: 5.7 %
HCT VFR BLD AUTO: 38.9 % (ref 34.8–46.1)
HDLC SERPL-MCNC: 96 MG/DL
HGB BLD-MCNC: 12.5 G/DL (ref 11.5–15.4)
IMM GRANULOCYTES # BLD AUTO: 0.02 THOUSAND/UL (ref 0–0.2)
IMM GRANULOCYTES NFR BLD AUTO: 1 % (ref 0–2)
LDLC SERPL CALC-MCNC: 114 MG/DL (ref 0–100)
LYMPHOCYTES # BLD AUTO: 1.48 THOUSANDS/ÂΜL (ref 0.6–4.47)
LYMPHOCYTES NFR BLD AUTO: 34 % (ref 14–44)
MCH RBC QN AUTO: 29.6 PG (ref 26.8–34.3)
MCHC RBC AUTO-ENTMCNC: 32.1 G/DL (ref 31.4–37.4)
MCV RBC AUTO: 92 FL (ref 82–98)
MONOCYTES # BLD AUTO: 0.39 THOUSAND/ÂΜL (ref 0.17–1.22)
MONOCYTES NFR BLD AUTO: 9 % (ref 4–12)
NEUTROPHILS # BLD AUTO: 2.3 THOUSANDS/ÂΜL (ref 1.85–7.62)
NEUTS SEG NFR BLD AUTO: 53 % (ref 43–75)
NRBC BLD AUTO-RTO: 0 /100 WBCS
PLATELET # BLD AUTO: 227 THOUSANDS/UL (ref 149–390)
PMV BLD AUTO: 11.8 FL (ref 8.9–12.7)
POTASSIUM SERPL-SCNC: 4 MMOL/L (ref 3.5–5.3)
PROT SERPL-MCNC: 7 G/DL (ref 6.4–8.4)
RBC # BLD AUTO: 4.23 MILLION/UL (ref 3.81–5.12)
SODIUM SERPL-SCNC: 141 MMOL/L (ref 135–147)
TRIGL SERPL-MCNC: 72 MG/DL
TSH SERPL DL<=0.05 MIU/L-ACNC: 0.7 UIU/ML (ref 0.45–4.5)
WBC # BLD AUTO: 4.3 THOUSAND/UL (ref 4.31–10.16)

## 2024-06-05 PROCEDURE — 80053 COMPREHEN METABOLIC PANEL: CPT

## 2024-06-05 PROCEDURE — 84443 ASSAY THYROID STIM HORMONE: CPT

## 2024-06-05 PROCEDURE — 80061 LIPID PANEL: CPT

## 2024-06-05 PROCEDURE — 36415 COLL VENOUS BLD VENIPUNCTURE: CPT

## 2024-06-05 PROCEDURE — 85025 COMPLETE CBC W/AUTO DIFF WBC: CPT

## 2024-06-05 PROCEDURE — 83036 HEMOGLOBIN GLYCOSYLATED A1C: CPT

## 2024-06-06 ENCOUNTER — RA CDI HCC (OUTPATIENT)
Dept: OTHER | Facility: HOSPITAL | Age: 74
End: 2024-06-06

## 2024-06-06 PROBLEM — Z12.31 ENCOUNTER FOR SCREENING MAMMOGRAM FOR MALIGNANT NEOPLASM OF BREAST: Status: RESOLVED | Noted: 2021-04-21 | Resolved: 2024-06-06

## 2024-06-12 ENCOUNTER — OFFICE VISIT (OUTPATIENT)
Dept: INTERNAL MEDICINE CLINIC | Facility: CLINIC | Age: 74
End: 2024-06-12
Payer: MEDICARE

## 2024-06-12 VITALS
WEIGHT: 120.2 LBS | SYSTOLIC BLOOD PRESSURE: 170 MMHG | HEART RATE: 72 BPM | HEIGHT: 61 IN | OXYGEN SATURATION: 97 % | DIASTOLIC BLOOD PRESSURE: 102 MMHG | BODY MASS INDEX: 22.69 KG/M2

## 2024-06-12 DIAGNOSIS — Z12.31 ENCOUNTER FOR SCREENING MAMMOGRAM FOR MALIGNANT NEOPLASM OF BREAST: ICD-10-CM

## 2024-06-12 DIAGNOSIS — I10 HYPERTENSION, UNSPECIFIED TYPE: ICD-10-CM

## 2024-06-12 DIAGNOSIS — M81.0 AGE-RELATED OSTEOPOROSIS WITHOUT CURRENT PATHOLOGICAL FRACTURE: ICD-10-CM

## 2024-06-12 DIAGNOSIS — E03.8 HYPOTHYROIDISM DUE TO HASHIMOTO'S THYROIDITIS: ICD-10-CM

## 2024-06-12 DIAGNOSIS — E03.9 HYPOTHYROIDISM, UNSPECIFIED TYPE: Primary | ICD-10-CM

## 2024-06-12 DIAGNOSIS — G47.09 OTHER INSOMNIA: ICD-10-CM

## 2024-06-12 DIAGNOSIS — E06.3 HYPOTHYROIDISM DUE TO HASHIMOTO'S THYROIDITIS: ICD-10-CM

## 2024-06-12 DIAGNOSIS — E78.2 MIXED HYPERLIPIDEMIA: ICD-10-CM

## 2024-06-12 DIAGNOSIS — M81.0 OSTEOPOROSIS WITHOUT CURRENT PATHOLOGICAL FRACTURE, UNSPECIFIED OSTEOPOROSIS TYPE: ICD-10-CM

## 2024-06-12 DIAGNOSIS — R73.03 PREDIABETES: ICD-10-CM

## 2024-06-12 DIAGNOSIS — E78.5 HYPERLIPIDEMIA, UNSPECIFIED HYPERLIPIDEMIA TYPE: ICD-10-CM

## 2024-06-12 PROCEDURE — 99214 OFFICE O/P EST MOD 30 MIN: CPT | Performed by: INTERNAL MEDICINE

## 2024-06-12 PROCEDURE — G2211 COMPLEX E/M VISIT ADD ON: HCPCS | Performed by: INTERNAL MEDICINE

## 2024-06-12 NOTE — ASSESSMENT & PLAN NOTE
Based on results from 2021.  The T-score of -2.9 at the lumbar spine and -2.6 at the left femoral neck are consistent with osteoporosis.  Stopped Prolia last visit.  Prefers to hold off on DEXA scan right now.

## 2024-06-12 NOTE — PROGRESS NOTES
Ambulatory Visit  Name: Bibi Michaels      : 1950      MRN: [id number]  Encounter Provider: Aditi Lopez MD  Encounter Date: 2024   Encounter department: [facility] INTERNAL MEDICINE [place]    Assessment & Plan     1. Hypothyroidism, unspecified type  Assessment & Plan:  Appears euthyroid.  TSH normal.  Continue levothyroxine.  Orders:  -     TSH, 3rd generation with Free T4 reflex; Future  2. Encounter for screening mammogram for malignant neoplasm of breast  -     Mammo screening bilateral w 3d & cad; Future  3. Hyperlipidemia, unspecified hyperlipidemia type  Assessment & Plan:  Continue pravastatin.  She has a high HDL.  Orders:  -     CBC and differential; Future  -     Basic metabolic panel; Future  -     Lipid Panel with Direct LDL reflex; Future  4. Prediabetes  Assessment & Plan:  A1c 5.7.  Discussed cutting down on sweets.  Orders:  -     Hemoglobin A1C; Future  5. Hypertension, unspecified type  Assessment & Plan:  Blood pressure is controlled at home.  She does have a history of whitecoat hypertension.  Continue lisinopril.  She did bring in a log of her blood pressures.  Averages around 130 systolic.  Orders:  -     CBC and differential; Future  -     Basic metabolic panel; Future  6. Osteoporosis without current pathological fracture, unspecified osteoporosis type  Assessment & Plan:  Based on results from .  The T-score of -2.9 at the lumbar spine and -2.6 at the left femoral neck are consistent with osteoporosis.  Stopped Prolia last visit.  Prefers to hold off on DEXA scan right now.  Orders:  -     Vitamin D 25 hydroxy; Future  7. Age-related osteoporosis without current pathological fracture  Assessment & Plan:  Based on results from .  The T-score of -2.9 at the lumbar spine and -2.6 at the left femoral neck are consistent with osteoporosis.  Stopped Prolia last visit.  Prefers to hold off on DEXA scan right now.  8. Hypothyroidism due to Hashimoto's  thyroiditis  Assessment & Plan:  Appears euthyroid.  TSH normal.  Continue levothyroxine.  9. Mixed hyperlipidemia  Assessment & Plan:  Continue pravastatin.  She has a high HDL.  10. Other insomnia  Assessment & Plan:  Try melatonin.  Establish sleep routine.  Avoid electronics before bed.       History of Present Illness     Patient is here for routine follow up, reviewed chronic medical problems, ordered labs for next visit including CBC CMP TSH A1C LIPID. Reviewed labs for this visit.      Review of Systems   Constitutional:  Negative for chills and fever.   HENT:  Negative for ear pain and sore throat.    Eyes:  Negative for pain and visual disturbance.   Respiratory:  Negative for cough and shortness of breath.    Cardiovascular:  Negative for chest pain and palpitations.   Gastrointestinal:  Negative for abdominal pain and vomiting.   Genitourinary:  Negative for dysuria and hematuria.   Musculoskeletal:  Negative for arthralgias and back pain.   Skin:  Negative for color change and rash.   Neurological:  Negative for seizures and syncope.   Psychiatric/Behavioral:  Positive for sleep disturbance.    All other systems reviewed and are negative.    Past Medical History   Past Medical History:   Diagnosis Date   • Arthritis 2000   • Disease of thyroid gland 2009    Removed   • Encounter for screening mammogram for malignant neoplasm of breast 04/21/2021   • Hypertension    • Pneumonia     RESOLVED: [id number]   • Reactive airway disease     RESOLVED: [id number]     Past Surgical History:   Procedure Laterality Date   • NECK SURGERY  10/2009    LESION BIOPSY OF NECK MASS   • NEUROPLASTY / TRANSPOSITION MEDIAN NERVE AT CARPAL TUNNEL     • TOTAL THYROIDECTOMY      RESOLVED: [id number]     Family History   Problem Relation Age of Onset   • Diabetes Mother    • Hypertension Mother    • Heart disease Father    • No Known Problems Daughter    • No Known Problems Daughter    • Breast cancer Paternal Grandmother 70   • No  "Known Problems Maternal Aunt    • No Known Problems Maternal Aunt    • No Known Problems Paternal Aunt      Current Outpatient Medications on File Prior to Visit   Medication Sig Dispense Refill   • albuterol (PROVENTIL HFA,VENTOLIN HFA) 90 mcg/act inhaler Inhale 2 puffs every 4 (four) hours as needed for shortness of breath 18 g 1   • aspirin 81 MG tablet Take 1 tablet by mouth daily     • calcium carbonate (OS-LAUREN) 600 MG tablet Take 600 mg by mouth daily     • Cholecalciferol (VITAMIN D3) 2000 units capsule daily 100 capsule 0   • Flaxseed, Linseed, (FLAXSEED OIL) 1000 MG CAPS Take 2 capsules by mouth daily     • levothyroxine 75 mcg tablet TAKE 1 TABLET(75 MCG) BY MOUTH DAILY EARLY MORNING 90 tablet 3   • lisinopril (ZESTRIL) 30 mg tablet Take 1 tablet (30 mg total) by mouth daily 90 tablet 1   • Peak Flow Meter CORNELIA by Does not apply route     • pravastatin (PRAVACHOL) 80 mg tablet Take 1 tablet (80 mg total) by mouth daily 90 tablet 1   • vitamin E, tocopherol, 400 units capsule Take 1 capsule (400 Units total) by mouth daily       No current facility-administered medications on file prior to visit.   No Known Allergies   Objective     BP (!) 170/102 (BP Location: Left arm, Patient Position: Sitting, Cuff Size: Standard)   Pulse 72   Ht 5' 1\" (1.549 m)   Wt 54.5 kg (120 lb 3.2 oz)   SpO2 97%   BMI 22.71 kg/m²     Physical Exam  Vitals and nursing note reviewed.   Constitutional:       General: She is not in acute distress.     Appearance: Normal appearance. She is well-developed.   HENT:      Head: Normocephalic and atraumatic.   Eyes:      Conjunctiva/sclera: Conjunctivae normal.   Cardiovascular:      Rate and Rhythm: Normal rate and regular rhythm.      Heart sounds: No murmur heard.  Pulmonary:      Effort: Pulmonary effort is normal. No respiratory distress.      Breath sounds: Normal breath sounds.   Abdominal:      Tenderness: There is no abdominal tenderness.   Musculoskeletal:         General: No " swelling.      Cervical back: Neck supple.   Skin:     General: Skin is warm and dry.      Capillary Refill: Capillary refill takes less than 2 seconds.   Neurological:      Mental Status: She is alert.   Psychiatric:         Mood and Affect: Mood normal.   Administrative Statements   I have spent a total time of 20 minutes on 06/12/24 In caring for this patient including Instructions for management, Patient and family education, Importance of tx compliance, Risk factor reductions, Impressions, Documenting in the medical record, and Reviewing / ordering tests, medicine, procedures  .

## 2024-06-12 NOTE — ASSESSMENT & PLAN NOTE
Blood pressure is controlled at home.  She does have a history of whitecoat hypertension.  Continue lisinopril.  She did bring in a log of her blood pressures.  Averages around 130 systolic.

## 2024-07-12 DIAGNOSIS — I10 HYPERTENSION, UNSPECIFIED TYPE: ICD-10-CM

## 2024-07-12 RX ORDER — LISINOPRIL 30 MG/1
30 TABLET ORAL DAILY
Qty: 90 TABLET | Refills: 1 | Status: SHIPPED | OUTPATIENT
Start: 2024-07-12

## 2024-08-19 DIAGNOSIS — R05.9 COUGH: ICD-10-CM

## 2024-08-20 RX ORDER — ALBUTEROL SULFATE 90 UG/1
2 AEROSOL, METERED RESPIRATORY (INHALATION) EVERY 4 HOURS PRN
Qty: 8.5 G | Refills: 5 | Status: SHIPPED | OUTPATIENT
Start: 2024-08-20

## 2024-08-22 ENCOUNTER — HOSPITAL ENCOUNTER (OUTPATIENT)
Age: 74
Discharge: HOME | End: 2024-08-22
Payer: MEDICARE

## 2024-08-22 VITALS — BODY MASS INDEX: 22.66 KG/M2 | WEIGHT: 120 LBS | HEIGHT: 61 IN

## 2024-08-22 DIAGNOSIS — Z12.31 ENCOUNTER FOR SCREENING MAMMOGRAM FOR MALIGNANT NEOPLASM OF BREAST: ICD-10-CM

## 2024-08-22 PROCEDURE — 77067 SCR MAMMO BI INCL CAD: CPT

## 2024-08-22 PROCEDURE — 77063 BREAST TOMOSYNTHESIS BI: CPT

## 2024-09-03 DIAGNOSIS — E03.9 HYPOTHYROIDISM, UNSPECIFIED TYPE: ICD-10-CM

## 2024-09-03 RX ORDER — LEVOTHYROXINE SODIUM 75 UG/1
75 TABLET ORAL
Qty: 90 TABLET | Refills: 3 | Status: SHIPPED | OUTPATIENT
Start: 2024-09-03

## 2024-09-03 NOTE — TELEPHONE ENCOUNTER
Requested Prescriptions     Pending Prescriptions Disp Refills    levothyroxine 75 mcg tablet 90 tablet 3     Sig: Take 1 tablet (75 mcg total) by mouth daily in the early morning     LAST SEEN: 6/12/24  NEXT APPT: 12/18/24  LAST FILLED: 9/5/23

## 2024-11-13 DIAGNOSIS — I10 HYPERTENSION, UNSPECIFIED TYPE: ICD-10-CM

## 2024-11-13 DIAGNOSIS — E78.5 HYPERLIPIDEMIA, UNSPECIFIED HYPERLIPIDEMIA TYPE: ICD-10-CM

## 2024-11-13 DIAGNOSIS — R05.9 COUGH: ICD-10-CM

## 2024-11-13 DIAGNOSIS — E03.9 HYPOTHYROIDISM, UNSPECIFIED TYPE: ICD-10-CM

## 2024-11-13 RX ORDER — LEVOTHYROXINE SODIUM 75 UG/1
75 TABLET ORAL
Qty: 90 TABLET | Refills: 3 | Status: SHIPPED | OUTPATIENT
Start: 2024-11-13

## 2024-11-13 RX ORDER — LISINOPRIL 30 MG/1
30 TABLET ORAL DAILY
Qty: 90 TABLET | Refills: 1 | Status: SHIPPED | OUTPATIENT
Start: 2024-11-13

## 2024-11-13 RX ORDER — ALBUTEROL SULFATE 90 UG/1
2 INHALANT RESPIRATORY (INHALATION) EVERY 4 HOURS PRN
Qty: 8.5 G | Refills: 5 | Status: SHIPPED | OUTPATIENT
Start: 2024-11-13

## 2024-11-13 RX ORDER — PRAVASTATIN SODIUM 80 MG/1
80 TABLET ORAL DAILY
Qty: 90 TABLET | Refills: 1 | Status: SHIPPED | OUTPATIENT
Start: 2024-11-13

## 2024-11-13 NOTE — TELEPHONE ENCOUNTER
Patient is requesting new scripts for levothyroxine 75 mcg tablet, take 1 tablet by mouth daily in the early morning; albuterol 90 mcg/act inhaler, inhale 2 puffs by mouth every 4 hours as needed for shortness of breath; lisinopril 30 mg tablet, take 1 tablet by mouth daily; pravastatin 80 mg tablet, take 1 tablet by mouth daily, sent to [facility].     They want to switch from [facility] and the pharmacy states they cannot reach them to transfer prescriptions.    Call back #[phone]

## 2024-12-12 ENCOUNTER — APPOINTMENT (OUTPATIENT)
Dept: LAB | Facility: HOSPITAL | Age: 74
End: 2024-12-12
Attending: INTERNAL MEDICINE
Payer: MEDICARE

## 2024-12-12 ENCOUNTER — RESULTS FOLLOW-UP (OUTPATIENT)
Dept: INTERNAL MEDICINE CLINIC | Facility: CLINIC | Age: 74
End: 2024-12-12

## 2024-12-12 DIAGNOSIS — I10 HYPERTENSION, UNSPECIFIED TYPE: ICD-10-CM

## 2024-12-12 DIAGNOSIS — E03.9 HYPOTHYROIDISM, UNSPECIFIED TYPE: ICD-10-CM

## 2024-12-12 DIAGNOSIS — M81.0 OSTEOPOROSIS WITHOUT CURRENT PATHOLOGICAL FRACTURE, UNSPECIFIED OSTEOPOROSIS TYPE: ICD-10-CM

## 2024-12-12 DIAGNOSIS — E78.5 HYPERLIPIDEMIA, UNSPECIFIED HYPERLIPIDEMIA TYPE: ICD-10-CM

## 2024-12-12 DIAGNOSIS — R73.03 PREDIABETES: ICD-10-CM

## 2024-12-12 LAB
25(OH)D3 SERPL-MCNC: 45.2 NG/ML (ref 30–100)
ANION GAP SERPL CALCULATED.3IONS-SCNC: 5 MMOL/L (ref 4–13)
BASOPHILS # BLD AUTO: 0.03 THOUSANDS/ÂΜL (ref 0–0.1)
BASOPHILS NFR BLD AUTO: 1 % (ref 0–1)
BUN SERPL-MCNC: 11 MG/DL (ref 5–25)
CALCIUM SERPL-MCNC: 9.1 MG/DL (ref 8.4–10.2)
CHLORIDE SERPL-SCNC: 104 MMOL/L (ref 96–108)
CHOLEST SERPL-MCNC: 213 MG/DL (ref ?–200)
CO2 SERPL-SCNC: 31 MMOL/L (ref 21–32)
CREAT SERPL-MCNC: 0.61 MG/DL (ref 0.6–1.3)
EOSINOPHIL # BLD AUTO: 0.04 THOUSAND/ÂΜL (ref 0–0.61)
EOSINOPHIL NFR BLD AUTO: 1 % (ref 0–6)
ERYTHROCYTE [DISTWIDTH] IN BLOOD BY AUTOMATED COUNT: 12.9 % (ref 11.6–15.1)
EST. AVERAGE GLUCOSE BLD GHB EST-MCNC: 108 MG/DL
GFR SERPL CREATININE-BSD FRML MDRD: 89 ML/MIN/1.73SQ M
GLUCOSE P FAST SERPL-MCNC: 95 MG/DL (ref 65–99)
HBA1C MFR BLD: 5.4 %
HCT VFR BLD AUTO: 38.8 % (ref 34.8–46.1)
HDLC SERPL-MCNC: 93 MG/DL
HGB BLD-MCNC: 12.6 G/DL (ref 11.5–15.4)
IMM GRANULOCYTES # BLD AUTO: 0.01 THOUSAND/UL (ref 0–0.2)
IMM GRANULOCYTES NFR BLD AUTO: 0 % (ref 0–2)
LDLC SERPL CALC-MCNC: 108 MG/DL (ref 0–100)
LYMPHOCYTES # BLD AUTO: 1.38 THOUSANDS/ÂΜL (ref 0.6–4.47)
LYMPHOCYTES NFR BLD AUTO: 36 % (ref 14–44)
MCH RBC QN AUTO: 29.6 PG (ref 26.8–34.3)
MCHC RBC AUTO-ENTMCNC: 32.5 G/DL (ref 31.4–37.4)
MCV RBC AUTO: 91 FL (ref 82–98)
MONOCYTES # BLD AUTO: 0.34 THOUSAND/ÂΜL (ref 0.17–1.22)
MONOCYTES NFR BLD AUTO: 9 % (ref 4–12)
NEUTROPHILS # BLD AUTO: 2.03 THOUSANDS/ÂΜL (ref 1.85–7.62)
NEUTS SEG NFR BLD AUTO: 53 % (ref 43–75)
NRBC BLD AUTO-RTO: 0 /100 WBCS
PLATELET # BLD AUTO: 225 THOUSANDS/UL (ref 149–390)
PMV BLD AUTO: 11.3 FL (ref 8.9–12.7)
POTASSIUM SERPL-SCNC: 3.7 MMOL/L (ref 3.5–5.3)
RBC # BLD AUTO: 4.25 MILLION/UL (ref 3.81–5.12)
SODIUM SERPL-SCNC: 140 MMOL/L (ref 135–147)
TRIGL SERPL-MCNC: 61 MG/DL (ref ?–150)
TSH SERPL DL<=0.05 MIU/L-ACNC: 0.64 UIU/ML (ref 0.45–4.5)
WBC # BLD AUTO: 3.83 THOUSAND/UL (ref 4.31–10.16)

## 2024-12-12 PROCEDURE — 36415 COLL VENOUS BLD VENIPUNCTURE: CPT

## 2024-12-12 PROCEDURE — 85025 COMPLETE CBC W/AUTO DIFF WBC: CPT

## 2024-12-12 PROCEDURE — 80061 LIPID PANEL: CPT

## 2024-12-12 PROCEDURE — 82306 VITAMIN D 25 HYDROXY: CPT

## 2024-12-12 PROCEDURE — 84443 ASSAY THYROID STIM HORMONE: CPT

## 2024-12-12 PROCEDURE — 83036 HEMOGLOBIN GLYCOSYLATED A1C: CPT

## 2024-12-12 PROCEDURE — 80048 BASIC METABOLIC PNL TOTAL CA: CPT

## 2024-12-18 ENCOUNTER — OFFICE VISIT (OUTPATIENT)
Dept: INTERNAL MEDICINE CLINIC | Facility: CLINIC | Age: 74
End: 2024-12-18
Payer: MEDICARE

## 2024-12-18 VITALS
HEART RATE: 78 BPM | DIASTOLIC BLOOD PRESSURE: 90 MMHG | HEIGHT: 61 IN | WEIGHT: 116 LBS | SYSTOLIC BLOOD PRESSURE: 160 MMHG | OXYGEN SATURATION: 98 % | TEMPERATURE: 97.5 F | BODY MASS INDEX: 21.9 KG/M2

## 2024-12-18 DIAGNOSIS — E78.2 MIXED HYPERLIPIDEMIA: ICD-10-CM

## 2024-12-18 DIAGNOSIS — Z00.00 MEDICARE ANNUAL WELLNESS VISIT, SUBSEQUENT: Primary | ICD-10-CM

## 2024-12-18 DIAGNOSIS — I10 PRIMARY HYPERTENSION: ICD-10-CM

## 2024-12-18 DIAGNOSIS — R73.03 PREDIABETES: ICD-10-CM

## 2024-12-18 DIAGNOSIS — M81.0 AGE-RELATED OSTEOPOROSIS WITHOUT CURRENT PATHOLOGICAL FRACTURE: ICD-10-CM

## 2024-12-18 PROCEDURE — G0439 PPPS, SUBSEQ VISIT: HCPCS | Performed by: INTERNAL MEDICINE

## 2024-12-18 PROCEDURE — 99214 OFFICE O/P EST MOD 30 MIN: CPT | Performed by: INTERNAL MEDICINE

## 2024-12-18 NOTE — PROGRESS NOTES
Name: Bibi Michaels      : 1950      MRN: [id number]  Encounter Provider: Aditi Lopez MD  Encounter Date: 2024   Encounter department: [facility] INTERNAL MEDICINE [place]    Assessment & Plan  Medicare annual wellness visit, subsequent  AWV completed.       Primary hypertension  Blood pressure is controlled at home.  She does have a history of whitecoat hypertension.  Continue lisinopril.  She did bring in a log of her blood pressures.  Averages around 130 systolic.    Orders:    CBC and differential; Future    Basic metabolic panel; Future    Age-related osteoporosis without current pathological fracture  Based on results from .  The T-score of -2.9 at the lumbar spine and -2.6 at the left femoral neck are consistent with osteoporosis.  Stopped Prolia last visit.  Prefers to hold off on DEXA scan right now.         Mixed hyperlipidemia  Continue pravastatin.  She has a high HDL.  The 10-year ASCVD risk score (Shakeel COLES, et al., 2019) is: 28.1%    Values used to calculate the score:      Age: 74 years      Sex: Female      Is Non- : No      Diabetic: No      Tobacco smoker: No      Systolic Blood Pressure: 160 mmHg      Is BP treated: Yes      HDL Cholesterol: 93 mg/dL      Total Cholesterol: 213 mg/dL    Orders:    Lipid Panel with Direct LDL reflex; Future    Prediabetes  A1c 5.4.  Discussed cutting down on sweets.    Orders:    Hemoglobin A1C; Future      Depression Screening and Follow-up Plan: Patient was screened for depression during today's encounter. They screened negative with a PHQ-2 score of 0.      Preventive health issues were discussed with patient, and age appropriate screening tests were ordered as noted in patient's After Visit Summary. Personalized health advice and appropriate referrals for health education or preventive services given if needed, as noted in patient's After Visit Summary.    History of Present Illness   Here for AWV and  f/u.       Patient Care Team:  Aditi Lopez MD as PCP - General (Internal Medicine)  Aditi Lopez MD (Internal Medicine)    Review of Systems   Constitutional:  Negative for chills and fever.   HENT:  Negative for ear pain and sore throat.    Eyes:  Negative for pain and visual disturbance.   Respiratory:  Negative for cough and shortness of breath.    Cardiovascular:  Negative for chest pain and palpitations.   Gastrointestinal:  Negative for abdominal pain and vomiting.   Genitourinary:  Negative for dysuria and hematuria.   Musculoskeletal:  Negative for arthralgias and back pain.   Skin:  Negative for color change and rash.   Neurological:  Negative for seizures and syncope.   All other systems reviewed and are negative.    Medical History Reviewed by provider this encounter:  Tobacco  Allergies  Meds  Problems  Med Hx  Surg Hx  Fam Hx       Annual Wellness Visit Questionnaire   Bibi is here for her Subsequent Wellness visit. Last Medicare Wellness visit information reviewed, patient interviewed and updates made to the record today.      Health Risk Assessment:   Patient rates overall health as very good. Patient feels that their physical health rating is same. Patient is satisfied with their life. Eyesight was rated as same. Hearing was rated as same. Patient feels that their emotional and mental health rating is same. Patients states they are never, rarely angry. Patient states they are sometimes unusually tired/fatigued. Pain experienced in the last 7 days has been none. Patient states that she has experienced no weight loss or gain in last 6 months.     Depression Screening:   PHQ-2 Score: 0      Fall Risk Screening:   In the past year, patient has experienced: no history of falling in past year      Urinary Incontinence Screening:   Patient has not leaked urine accidently in the last six months.     Home Safety:  Patient does not have trouble with stairs inside or outside of their home.  Patient has working smoke alarms and has working carbon monoxide detector. Home safety hazards include: none.     Nutrition:   Current diet is Regular.     Medications:   Patient is currently taking over-the-counter supplements. OTC medications include: see medication list. Patient is able to manage medications.     Activities of Daily Living (ADLs)/Instrumental Activities of Daily Living (IADLs):   Walk and transfer into and out of bed and chair?: Yes  Dress and groom yourself?: Yes    Bathe or shower yourself?: Yes    Feed yourself? Yes  Do your laundry/housekeeping?: Yes  Manage your money, pay your bills and track your expenses?: Yes  Make your own meals?: Yes    Do your own shopping?: Yes    Previous Hospitalizations:   Any hospitalizations or ED visits within the last 12 months?: No      Advance Care Planning:   Living will: Yes    Durable POA for healthcare: Yes    Advanced directive: Yes      Cognitive Screening:   Provider or family/friend/caregiver concerned regarding cognition?: No    PREVENTIVE SCREENINGS      Cardiovascular Screening:    General: History Lipid Disorder and Screening Current      Diabetes Screening:     General: Screening Current      Colorectal Cancer Screening:     General: Screening Current      Breast Cancer Screening:     General: Screening Current      Cervical Cancer Screening:    General: Screening Not Indicated      Osteoporosis Screening:    General: History Osteoporosis, Risks and Benefits Discussed and Patient Declines      Abdominal Aortic Aneurysm (AAA) Screening:        General: Screening Not Indicated      Lung Cancer Screening:     General: Screening Not Indicated      Hepatitis C Screening:    General: Screening Current    Screening, Brief Intervention, and Referral to Treatment (SBIRT)    Screening  Typical number of drinks in a day: 0  Typical number of drinks in a week: 0  Interpretation: Low risk drinking behavior.    AUDIT-C Screenin) How often did you have a  "drink containing alcohol in the past year? monthly or less  2) How many drinks did you have on a typical day when you were drinking in the past year? 1 to 2  3) How often did you have 6 or more drinks on one occasion in the past year? never    AUDIT-C Score: 1  Interpretation: Score 0-2 (female): Negative screen for alcohol misuse    Single Item Drug Screening:  How often have you used an illegal drug (including marijuana) or a prescription medication for non-medical reasons in the past year? never    Single Item Drug Screen Score: 0  Interpretation: Negative screen for possible drug use disorder    Brief Intervention  Alcohol & drug use screenings were reviewed. No concerns regarding substance use disorder identified.     Other Counseling Topics:   Car/seat belt/driving safety.     Social Drivers of Health     Financial Resource Strain: Low Risk  (11/29/2023)    Overall Financial Resource Strain (CARDIA)     Difficulty of Paying Living Expenses: Not very hard   Food Insecurity: No Food Insecurity (12/18/2024)    Hunger Vital Sign     Worried About Running Out of Food in the Last Year: Never true     Ran Out of Food in the Last Year: Never true   Transportation Needs: No Transportation Needs (12/18/2024)    PRAPARE - Transportation     Lack of Transportation (Medical): No     Lack of Transportation (Non-Medical): No   Housing Stability: Low Risk  (12/18/2024)    Housing Stability Vital Sign     Unable to Pay for Housing in the Last Year: No     Number of Times Moved in the Last Year: 0     Homeless in the Last Year: No   Utilities: Not At Risk (12/18/2024)    [facility] Utilities     Threatened with loss of utilities: No     No results found.    Objective   /90   Pulse 78   Temp 97.5 °F (36.4 °C) (Temporal)   Ht 5' 1\" (1.549 m)   Wt 52.6 kg (116 lb)   SpO2 98%   BMI 21.92 kg/m²     Physical Exam  Vitals and nursing note reviewed.   Constitutional:       General: She is not in acute distress.     Appearance: She " is well-developed.   HENT:      Head: Normocephalic and atraumatic.   Eyes:      Conjunctiva/sclera: Conjunctivae normal.   Cardiovascular:      Rate and Rhythm: Normal rate and regular rhythm.      Heart sounds: No murmur heard.  Pulmonary:      Effort: Pulmonary effort is normal. No respiratory distress.      Breath sounds: Normal breath sounds.   Abdominal:      Palpations: Abdomen is soft.      Tenderness: There is no abdominal tenderness.   Musculoskeletal:         General: No swelling.      Cervical back: Neck supple.   Skin:     General: Skin is warm and dry.      Capillary Refill: Capillary refill takes less than 2 seconds.   Neurological:      Mental Status: She is alert.   Psychiatric:         Mood and Affect: Mood normal.

## 2024-12-18 NOTE — ASSESSMENT & PLAN NOTE
Blood pressure is controlled at home.  She does have a history of whitecoat hypertension.  Continue lisinopril.  She did bring in a log of her blood pressures.  Averages around 130 systolic.    Orders:    CBC and differential; Future    Basic metabolic panel; Future

## 2024-12-18 NOTE — ASSESSMENT & PLAN NOTE
Continue pravastatin.  She has a high HDL.  The 10-year ASCVD risk score (Shakeel COLES, et al., 2019) is: 28.1%    Values used to calculate the score:      Age: 74 years      Sex: Female      Is Non- : No      Diabetic: No      Tobacco smoker: No      Systolic Blood Pressure: 160 mmHg      Is BP treated: Yes      HDL Cholesterol: 93 mg/dL      Total Cholesterol: 213 mg/dL    Orders:    Lipid Panel with Direct LDL reflex; Future

## 2024-12-18 NOTE — PATIENT INSTRUCTIONS
Medicare Preventive Visit Patient Instructions  Thank you for completing your Welcome to Medicare Visit or Medicare Annual Wellness Visit today. Your next wellness visit will be due in one year (12/19/2025).  The screening/preventive services that you may require over the next 5-10 years are detailed below. Some tests may not apply to you based off risk factors and/or age. Screening tests ordered at today's visit but not completed yet may show as past due. Also, please note that scanned in results may not display below.  Preventive Screenings:  Service Recommendations Previous Testing/Comments   Colorectal Cancer Screening  * Colonoscopy    * Fecal Occult Blood Test (FOBT)/Fecal Immunochemical Test (FIT)  * Fecal DNA/Cologuard Test  * Flexible Sigmoidoscopy Age: 45-75 years old   Colonoscopy: every 10 years (may be performed more frequently if at higher risk)  OR  FOBT/FIT: every 1 year  OR  Cologuard: every 3 years  OR  Sigmoidoscopy: every 5 years  Screening may be recommended earlier than age 45 if at higher risk for colorectal cancer. Also, an individualized decision between you and your healthcare provider will decide whether screening between the ages of 76-85 would be appropriate. Colonoscopy: 06/04/2013  FOBT/FIT: Not on file  Cologuard: Not on file  Sigmoidoscopy: Not on file          Breast Cancer Screening Age: 40+ years old  Frequency: every 1-2 years  Not required if history of left and right mastectomy Mammogram: 08/22/2024    Screening Current   Cervical Cancer Screening Between the ages of 21-29, pap smear recommended once every 3 years.   Between the ages of 30-65, can perform pap smear with HPV co-testing every 5 years.   Recommendations may differ for women with a history of total hysterectomy, cervical cancer, or abnormal pap smears in past. Pap Smear: Not on file    Screening Not Indicated   Hepatitis C Screening Once for adults born between 1945 and 1965  More frequently in patients at high  risk for Hepatitis C Hep C Antibody: 06/28/2018    Screening Current   Diabetes Screening 1-2 times per year if you're at risk for diabetes or have pre-diabetes Fasting glucose: 95 mg/dL (12/12/2024)  A1C: 5.4 % (12/12/2024)  Screening Current   Cholesterol Screening Once every 5 years if you don't have a lipid disorder. May order more often based on risk factors. Lipid panel: 12/12/2024    Screening Not Indicated  History Lipid Disorder     Other Preventive Screenings Covered by Medicare:  Abdominal Aortic Aneurysm (AAA) Screening: covered once if your at risk. You're considered to be at risk if you have a family history of AAA.  Lung Cancer Screening: covers low dose CT scan once per year if you meet all of the following conditions: (1) Age 55-77; (2) No signs or symptoms of lung cancer; (3) Current smoker or have quit smoking within the last 15 years; (4) You have a tobacco smoking history of at least 20 pack years (packs per day multiplied by number of years you smoked); (5) You get a written order from a healthcare provider.  Glaucoma Screening: covered annually if you're considered high risk: (1) You have diabetes OR (2) Family history of glaucoma OR (3)  aged 50 and older OR (4)  American aged 65 and older  Osteoporosis Screening: covered every 2 years if you meet one of the following conditions: (1) You're estrogen deficient and at risk for osteoporosis based off medical history and other findings; (2) Have a vertebral abnormality; (3) On glucocorticoid therapy for more than 3 months; (4) Have primary hyperparathyroidism; (5) On osteoporosis medications and need to assess response to drug therapy.   Last bone density test (DXA Scan): 06/21/2021.  HIV Screening: covered annually if you're between the age of 15-65. Also covered annually if you are younger than 15 and older than 65 with risk factors for HIV infection. For pregnant patients, it is covered up to 3 times per  pregnancy.    Immunizations:  Immunization Recommendations   Influenza Vaccine Annual influenza vaccination during flu season is recommended for all persons aged >= 6 months who do not have contraindications   Pneumococcal Vaccine   * Pneumococcal conjugate vaccine = PCV13 (Prevnar 13), PCV15 (Vaxneuvance), PCV20 (Prevnar 20)  * Pneumococcal polysaccharide vaccine = PPSV23 (Pneumovax) Adults 19-65 yo with certain risk factors or if 65+ yo  If never received any pneumonia vaccine: recommend Prevnar 20 (PCV20)  Give PCV20 if previously received 1 dose of PCV13 or PPSV23   Hepatitis B Vaccine 3 dose series if at intermediate or high risk (ex: diabetes, end stage renal disease, liver disease)   Respiratory syncytial virus (RSV) Vaccine - COVERED BY MEDICARE PART D  * RSVPreF3 (Arexvy) CDC recommends that adults 60 years of age and older may receive a single dose of RSV vaccine using shared clinical decision-making (SCDM)   Tetanus (Td) Vaccine - COST NOT COVERED BY MEDICARE PART B Following completion of primary series, a booster dose should be given every 10 years to maintain immunity against tetanus. Td may also be given as tetanus wound prophylaxis.   Tdap Vaccine - COST NOT COVERED BY MEDICARE PART B Recommended at least once for all adults. For pregnant patients, recommended with each pregnancy.   Shingles Vaccine (Shingrix) - COST NOT COVERED BY MEDICARE PART B  2 shot series recommended in those 19 years and older who have or will have weakened immune systems or those 50 years and older     Health Maintenance Due:      Topic Date Due   • DXA SCAN  06/21/2023   • Colorectal Cancer Screening  06/12/2025 (Originally 9/17/1995)   • Breast Cancer Screening: Mammogram  08/22/2025   • Hepatitis C Screening  Completed     Immunizations Due:  There are no preventive care reminders to display for this patient.  Advance Directives   What are advance directives?  Advance directives are legal documents that state your wishes  and plans for medical care. These plans are made ahead of time in case you lose your ability to make decisions for yourself. Advance directives can apply to any medical decision, such as the treatments you want, and if you want to donate organs.   What are the types of advance directives?  There are many types of advance directives, and each state has rules about how to use them. You may choose a combination of any of the following:  Living will:  This is a written record of the treatment you want. You can also choose which treatments you do not want, which to limit, and which to stop at a certain time. This includes surgery, medicine, IV fluid, and tube feedings.   Durable power of  for healthcare (DPAHC):  This is a written record that states who you want to make healthcare choices for you when you are unable to make them for yourself. This person, called a proxy, is usually a family member or a friend. You may choose more than 1 proxy.  Do not resuscitate (DNR) order:  A DNR order is used in case your heart stops beating or you stop breathing. It is a request not to have certain forms of treatment, such as CPR. A DNR order may be included in other types of advance directives.  Medical directive:  This covers the care that you want if you are in a coma, near death, or unable to make decisions for yourself. You can list the treatments you want for each condition. Treatment may include pain medicine, surgery, blood transfusions, dialysis, IV or tube feedings, and a ventilator (breathing machine).  Values history:  This document has questions about your views, beliefs, and how you feel and think about life. This information can help others choose the care that you would choose.  Why are advance directives important?  An advance directive helps you control your care. Although spoken wishes may be used, it is better to have your wishes written down. Spoken wishes can be misunderstood, or not followed.  Treatments may be given even if you do not want them. An advance directive may make it easier for your family to make difficult choices about your care.       © Copyright [employer] [facility] 2018 Information is for End User's use only and may not be sold, redistributed or otherwise used for commercial purposes. All illustrations and images included in CareNotes® are the copyrighted property of [employer] or [employer]

## 2025-05-16 DIAGNOSIS — E78.5 HYPERLIPIDEMIA, UNSPECIFIED HYPERLIPIDEMIA TYPE: ICD-10-CM

## 2025-05-16 RX ORDER — PRAVASTATIN SODIUM 80 MG/1
80 TABLET ORAL DAILY
Qty: 90 TABLET | Refills: 1 | Status: SHIPPED | OUTPATIENT
Start: 2025-05-16

## 2025-06-11 ENCOUNTER — APPOINTMENT (OUTPATIENT)
Dept: LAB | Facility: HOSPITAL | Age: 75
End: 2025-06-11
Payer: MEDICARE

## 2025-06-11 DIAGNOSIS — E78.2 MIXED HYPERLIPIDEMIA: ICD-10-CM

## 2025-06-11 DIAGNOSIS — R73.03 PREDIABETES: ICD-10-CM

## 2025-06-11 DIAGNOSIS — I10 PRIMARY HYPERTENSION: ICD-10-CM

## 2025-06-11 LAB
ANION GAP SERPL CALCULATED.3IONS-SCNC: 5 MMOL/L (ref 4–13)
BASOPHILS # BLD AUTO: 0.03 THOUSANDS/ÂΜL (ref 0–0.1)
BASOPHILS NFR BLD AUTO: 1 % (ref 0–1)
BUN SERPL-MCNC: 14 MG/DL (ref 5–25)
CALCIUM SERPL-MCNC: 9.3 MG/DL (ref 8.4–10.2)
CHLORIDE SERPL-SCNC: 106 MMOL/L (ref 96–108)
CHOLEST SERPL-MCNC: 222 MG/DL (ref ?–200)
CO2 SERPL-SCNC: 31 MMOL/L (ref 21–32)
CREAT SERPL-MCNC: 0.59 MG/DL (ref 0.6–1.3)
EOSINOPHIL # BLD AUTO: 0.06 THOUSAND/ÂΜL (ref 0–0.61)
EOSINOPHIL NFR BLD AUTO: 1 % (ref 0–6)
ERYTHROCYTE [DISTWIDTH] IN BLOOD BY AUTOMATED COUNT: 13.3 % (ref 11.6–15.1)
EST. AVERAGE GLUCOSE BLD GHB EST-MCNC: 117 MG/DL
GFR SERPL CREATININE-BSD FRML MDRD: 90 ML/MIN/1.73SQ M
GLUCOSE P FAST SERPL-MCNC: 93 MG/DL (ref 65–99)
HBA1C MFR BLD: 5.7 %
HCT VFR BLD AUTO: 38 % (ref 34.8–46.1)
HDLC SERPL-MCNC: 100 MG/DL
HGB BLD-MCNC: 12.3 G/DL (ref 11.5–15.4)
IMM GRANULOCYTES # BLD AUTO: 0.02 THOUSAND/UL (ref 0–0.2)
IMM GRANULOCYTES NFR BLD AUTO: 1 % (ref 0–2)
LDLC SERPL CALC-MCNC: 107 MG/DL (ref 0–100)
LYMPHOCYTES # BLD AUTO: 1.43 THOUSANDS/ÂΜL (ref 0.6–4.47)
LYMPHOCYTES NFR BLD AUTO: 33 % (ref 14–44)
MCH RBC QN AUTO: 30.1 PG (ref 26.8–34.3)
MCHC RBC AUTO-ENTMCNC: 32.4 G/DL (ref 31.4–37.4)
MCV RBC AUTO: 93 FL (ref 82–98)
MONOCYTES # BLD AUTO: 0.35 THOUSAND/ÂΜL (ref 0.17–1.22)
MONOCYTES NFR BLD AUTO: 8 % (ref 4–12)
NEUTROPHILS # BLD AUTO: 2.41 THOUSANDS/ÂΜL (ref 1.85–7.62)
NEUTS SEG NFR BLD AUTO: 56 % (ref 43–75)
NRBC BLD AUTO-RTO: 0 /100 WBCS
PLATELET # BLD AUTO: 220 THOUSANDS/UL (ref 149–390)
PMV BLD AUTO: 11.6 FL (ref 8.9–12.7)
POTASSIUM SERPL-SCNC: 4.9 MMOL/L (ref 3.5–5.3)
RBC # BLD AUTO: 4.08 MILLION/UL (ref 3.81–5.12)
SODIUM SERPL-SCNC: 142 MMOL/L (ref 135–147)
TRIGL SERPL-MCNC: 75 MG/DL (ref ?–150)
WBC # BLD AUTO: 4.3 THOUSAND/UL (ref 4.31–10.16)

## 2025-06-11 PROCEDURE — 36415 COLL VENOUS BLD VENIPUNCTURE: CPT

## 2025-06-11 PROCEDURE — 83036 HEMOGLOBIN GLYCOSYLATED A1C: CPT

## 2025-06-11 PROCEDURE — 80048 BASIC METABOLIC PNL TOTAL CA: CPT

## 2025-06-11 PROCEDURE — 80061 LIPID PANEL: CPT

## 2025-06-11 PROCEDURE — 85025 COMPLETE CBC W/AUTO DIFF WBC: CPT

## 2025-06-19 ENCOUNTER — OFFICE VISIT (OUTPATIENT)
Dept: INTERNAL MEDICINE CLINIC | Facility: CLINIC | Age: 75
End: 2025-06-19
Payer: MEDICARE

## 2025-06-19 VITALS
OXYGEN SATURATION: 96 % | WEIGHT: 115 LBS | DIASTOLIC BLOOD PRESSURE: 98 MMHG | TEMPERATURE: 98.1 F | HEIGHT: 61 IN | BODY MASS INDEX: 21.71 KG/M2 | HEART RATE: 80 BPM | SYSTOLIC BLOOD PRESSURE: 160 MMHG

## 2025-06-19 DIAGNOSIS — E06.3 HYPOTHYROIDISM DUE TO HASHIMOTO'S THYROIDITIS: ICD-10-CM

## 2025-06-19 DIAGNOSIS — Z12.31 ENCOUNTER FOR SCREENING MAMMOGRAM FOR MALIGNANT NEOPLASM OF BREAST: ICD-10-CM

## 2025-06-19 DIAGNOSIS — I10 PRIMARY HYPERTENSION: Primary | ICD-10-CM

## 2025-06-19 DIAGNOSIS — E78.2 MIXED HYPERLIPIDEMIA: ICD-10-CM

## 2025-06-19 DIAGNOSIS — R73.03 PREDIABETES: ICD-10-CM

## 2025-06-19 DIAGNOSIS — M81.0 AGE-RELATED OSTEOPOROSIS WITHOUT CURRENT PATHOLOGICAL FRACTURE: ICD-10-CM

## 2025-06-19 PROCEDURE — G2211 COMPLEX E/M VISIT ADD ON: HCPCS | Performed by: INTERNAL MEDICINE

## 2025-06-19 PROCEDURE — 99214 OFFICE O/P EST MOD 30 MIN: CPT | Performed by: INTERNAL MEDICINE

## 2025-06-19 NOTE — ASSESSMENT & PLAN NOTE
Blood pressure is controlled at home.  She does have a history of whitecoat hypertension.  Continue lisinopril.  She did bring in a log of her blood pressures.  Averages around 130 systolic.      Orders:    CBC and differential; Future    Comprehensive metabolic panel; Future

## 2025-06-19 NOTE — PROGRESS NOTES
Name: Bibi Michaels      : 1950      MRN: [id number]  Encounter Provider: Aditi Lopez MD  Encounter Date: 2025   Encounter department: [facility] INTERNAL MEDICINE [place]  :  Assessment & Plan  Primary hypertension  Blood pressure is controlled at home.  She does have a history of whitecoat hypertension.  Continue lisinopril.  She did bring in a log of her blood pressures.  Averages around 130 systolic.      Orders:    CBC and differential; Future    Comprehensive metabolic panel; Future     Hypothyroidism due to Hashimoto's thyroiditis  Appears euthyroid.  TSH normal.  Continue levothyroxine.  Orders:    TSH, 3rd generation with Free T4 reflex; Future     Age-related osteoporosis without current pathological fracture  Based on results from .  The T-score of -2.9 at the lumbar spine and -2.6 at the left femoral neck are consistent with osteoporosis.  Stopped Prolia last visit.  Prefers to hold off on DEXA scan right now.      Orders:    Vitamin D 25 hydroxy; Future     Mixed hyperlipidemia  Continue pravastatin.  She has a high HDL.  The 10-year ASCVD risk score (Shakeel DK, et al., 2019) is: 28.2%    Values used to calculate the score:      Age: 74 years      Clincally relevant sex: Female      Is Non- : No      Diabetic: No      Tobacco smoker: No      Systolic Blood Pressure: 160 mmHg      Is BP treated: Yes      HDL Cholesterol: 100 mg/dL      Total Cholesterol: 222 mg/dL      Orders:    CBC and differential; Future    Comprehensive metabolic panel; Future    Lipid Panel with Direct LDL reflex; Future     Prediabetes  A1c 5.7.  Discussed cutting down on sweets.      Orders:    CBC and differential; Future    Comprehensive metabolic panel; Future    Hemoglobin A1C; Future     Encounter for screening mammogram for malignant neoplasm of breast  Mammogram due.  Orders:    Mammo screening bilateral w 3d and cad; Future          Depression Screening and Follow-up  "Plan: Patient was screened for depression during today's encounter. They screened negative with a PHQ-2 score of 0.        History of Present Illness   Routine f/u.      Review of Systems   Constitutional:  Negative for chills and fever.   HENT:  Negative for ear pain and sore throat.    Eyes:  Negative for pain and visual disturbance.   Respiratory:  Negative for cough and shortness of breath.    Cardiovascular:  Negative for chest pain and palpitations.   Gastrointestinal:  Negative for abdominal pain and vomiting.   Genitourinary:  Negative for dysuria and hematuria.   Musculoskeletal:  Negative for arthralgias and back pain.   Skin:  Negative for color change and rash.   Neurological:  Negative for seizures and syncope.   All other systems reviewed and are negative.      Objective   /98 (BP Location: Left arm, Patient Position: Sitting, Cuff Size: Standard)   Pulse 80   Temp 98.1 °F (36.7 °C) (Temporal)   Ht 5' 1\" (1.549 m)   Wt 52.2 kg (115 lb)   SpO2 96%   BMI 21.73 kg/m²      Physical Exam  Vitals and nursing note reviewed.   Constitutional:       General: She is not in acute distress.     Appearance: She is well-developed.   HENT:      Head: Normocephalic and atraumatic.     Eyes:      Conjunctiva/sclera: Conjunctivae normal.       Cardiovascular:      Rate and Rhythm: Normal rate and regular rhythm.      Heart sounds: No murmur heard.  Pulmonary:      Effort: Pulmonary effort is normal. No respiratory distress.      Breath sounds: Normal breath sounds.   Abdominal:      Palpations: Abdomen is soft.      Tenderness: There is no abdominal tenderness.     Musculoskeletal:         General: No swelling.      Cervical back: Neck supple.     Skin:     General: Skin is warm and dry.      Capillary Refill: Capillary refill takes less than 2 seconds.     Neurological:      Mental Status: She is alert.     Psychiatric:         Mood and Affect: Mood normal.         "

## 2025-06-19 NOTE — ASSESSMENT & PLAN NOTE
Appears euthyroid.  TSH normal.  Continue levothyroxine.  Orders:    TSH, 3rd generation with Free T4 reflex; Future

## 2025-06-19 NOTE — ASSESSMENT & PLAN NOTE
Continue pravastatin.  She has a high HDL.  The 10-year ASCVD risk score (Shakeel COLES, et al., 2019) is: 28.2%    Values used to calculate the score:      Age: 74 years      Clincally relevant sex: Female      Is Non- : No      Diabetic: No      Tobacco smoker: No      Systolic Blood Pressure: 160 mmHg      Is BP treated: Yes      HDL Cholesterol: 100 mg/dL      Total Cholesterol: 222 mg/dL      Orders:    CBC and differential; Future    Comprehensive metabolic panel; Future    Lipid Panel with Direct LDL reflex; Future

## 2025-06-19 NOTE — ASSESSMENT & PLAN NOTE
Based on results from 2021.  The T-score of -2.9 at the lumbar spine and -2.6 at the left femoral neck are consistent with osteoporosis.  Stopped Prolia last visit.  Prefers to hold off on DEXA scan right now.      Orders:    Vitamin D 25 hydroxy; Future

## 2025-06-19 NOTE — ASSESSMENT & PLAN NOTE
A1c 5.7.  Discussed cutting down on sweets.      Orders:    CBC and differential; Future    Comprehensive metabolic panel; Future    Hemoglobin A1C; Future

## 2025-07-11 DIAGNOSIS — I10 HYPERTENSION, UNSPECIFIED TYPE: ICD-10-CM

## 2025-07-11 NOTE — TELEPHONE ENCOUNTER
Requested Prescriptions     Pending Prescriptions Disp Refills    lisinopril (ZESTRIL) 30 mg tablet 90 tablet 1     Sig: Take 1 tablet (30 mg total) by mouth daily

## 2025-07-13 RX ORDER — LISINOPRIL 30 MG/1
30 TABLET ORAL DAILY
Qty: 90 TABLET | Refills: 1 | Status: SHIPPED | OUTPATIENT
Start: 2025-07-13